# Patient Record
Sex: FEMALE | Race: WHITE | Employment: OTHER | URBAN - NONMETROPOLITAN AREA
[De-identification: names, ages, dates, MRNs, and addresses within clinical notes are randomized per-mention and may not be internally consistent; named-entity substitution may affect disease eponyms.]

---

## 2017-07-10 ENCOUNTER — HOSPITAL ENCOUNTER (OUTPATIENT)
Dept: NON INVASIVE DIAGNOSTICS | Age: 67
Discharge: HOME | End: 2017-07-10
Payer: MEDICARE

## 2017-07-10 PROCEDURE — 93005 ELECTROCARDIOGRAM TRACING: CPT

## 2017-07-13 LAB
EKG ATRIAL RATE: 99 BPM
EKG P AXIS: 49 DEGREES
EKG P-R INTERVAL: 140 MS
EKG Q-T INTERVAL: 336 MS
EKG QRS DURATION: 92 MS
EKG QTC CALCULATION (BAZETT): 431 MS
EKG R AXIS: 60 DEGREES
EKG T AXIS: 13 DEGREES
EKG VENTRICULAR RATE: 99 BPM

## 2017-10-17 ENCOUNTER — HOSPITAL ENCOUNTER (OUTPATIENT)
Dept: GENERAL RADIOLOGY | Age: 67
Discharge: HOME | End: 2017-10-17
Payer: MEDICARE
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
## 2017-10-17 DIAGNOSIS — R52 PAIN: ICD-10-CM

## 2017-10-17 PROCEDURE — 73502 X-RAY EXAM HIP UNI 2-3 VIEWS: CPT

## 2017-12-06 ENCOUNTER — HOSPITAL ENCOUNTER (OUTPATIENT)
Dept: WOMENS IMAGING | Age: 67
Discharge: HOME | End: 2017-12-06
Payer: MEDICARE

## 2017-12-06 DIAGNOSIS — Z12.39 SCREENING BREAST EXAMINATION: ICD-10-CM

## 2017-12-06 PROCEDURE — G0202 SCR MAMMO BI INCL CAD: HCPCS

## 2018-01-02 ENCOUNTER — HOSPITAL ENCOUNTER (OUTPATIENT)
Age: 68
Setting detail: SPECIMEN
Discharge: HOME | End: 2018-01-02
Payer: MEDICARE

## 2018-01-02 LAB
ABSOLUTE EOS #: 0.1 K/UL (ref 0–0.4)
ABSOLUTE IMMATURE GRANULOCYTE: ABNORMAL K/UL (ref 0–0.3)
ABSOLUTE LYMPH #: 1.4 K/UL (ref 1–4.8)
ABSOLUTE MONO #: 0.5 K/UL (ref 0–1)
ALBUMIN SERPL-MCNC: 3.1 G/DL (ref 3.5–5.2)
ALBUMIN/GLOBULIN RATIO: 1 (ref 1–2.5)
ALP BLD-CCNC: 73 U/L (ref 35–104)
ALT SERPL-CCNC: 12 U/L (ref 5–33)
ANION GAP SERPL CALCULATED.3IONS-SCNC: 10 MMOL/L (ref 9–17)
AST SERPL-CCNC: 14 U/L
BASOPHILS # BLD: 0 % (ref 0–2)
BASOPHILS ABSOLUTE: 0 K/UL (ref 0–0.2)
BILIRUB SERPL-MCNC: <0.1 MG/DL (ref 0.3–1.2)
BUN BLDV-MCNC: 13 MG/DL (ref 8–23)
BUN/CREAT BLD: 16 (ref 9–20)
CALCIUM SERPL-MCNC: 9.3 MG/DL (ref 8.6–10.4)
CHLORIDE BLD-SCNC: 103 MMOL/L (ref 98–107)
CHOLESTEROL/HDL RATIO: 3
CHOLESTEROL: 140 MG/DL
CO2: 27 MMOL/L (ref 20–31)
CREAT SERPL-MCNC: 0.79 MG/DL (ref 0.5–0.9)
DIFFERENTIAL TYPE: ABNORMAL
EOSINOPHILS RELATIVE PERCENT: 1 % (ref 0–8)
GFR AFRICAN AMERICAN: >60 ML/MIN
GFR NON-AFRICAN AMERICAN: >60 ML/MIN
GFR SERPL CREATININE-BSD FRML MDRD: ABNORMAL ML/MIN/{1.73_M2}
GFR SERPL CREATININE-BSD FRML MDRD: ABNORMAL ML/MIN/{1.73_M2}
GLUCOSE BLD-MCNC: 95 MG/DL (ref 70–99)
HCT VFR BLD CALC: 35.2 % (ref 36–46)
HDLC SERPL-MCNC: 47 MG/DL
HEMOGLOBIN: 11.1 G/DL (ref 12–16)
IMMATURE GRANULOCYTES: ABNORMAL %
LDL CHOLESTEROL: 77 MG/DL (ref 0–130)
LYMPHOCYTES # BLD: 20 % (ref 24–44)
MCH RBC QN AUTO: 26.3 PG (ref 26–34)
MCHC RBC AUTO-ENTMCNC: 31.6 G/DL (ref 31–37)
MCV RBC AUTO: 83.2 FL (ref 80–100)
MONOCYTES # BLD: 8 % (ref 0–12)
PDW BLD-RTO: 14.8 % (ref 12.1–15.2)
PLATELET # BLD: 324 K/UL (ref 140–450)
PLATELET ESTIMATE: ABNORMAL
PMV BLD AUTO: 9.5 FL (ref 6–12)
POTASSIUM SERPL-SCNC: 4.3 MMOL/L (ref 3.7–5.3)
RBC # BLD: 4.23 M/UL (ref 4–5.2)
RBC # BLD: ABNORMAL 10*6/UL
SEG NEUTROPHILS: 71 % (ref 36–66)
SEGMENTED NEUTROPHILS ABSOLUTE COUNT: 5.2 K/UL (ref 1.8–7.7)
SODIUM BLD-SCNC: 140 MMOL/L (ref 135–144)
TOTAL PROTEIN: 6.1 G/DL (ref 6.4–8.3)
TRIGL SERPL-MCNC: 80 MG/DL
VLDLC SERPL CALC-MCNC: NORMAL MG/DL (ref 1–30)
WBC # BLD: 7.3 K/UL (ref 3.5–11)
WBC # BLD: ABNORMAL 10*3/UL

## 2018-01-02 PROCEDURE — 85025 COMPLETE CBC W/AUTO DIFF WBC: CPT

## 2018-01-02 PROCEDURE — P9604 ONE-WAY ALLOW PRORATED TRIP: HCPCS

## 2018-01-02 PROCEDURE — 80053 COMPREHEN METABOLIC PANEL: CPT

## 2018-01-02 PROCEDURE — 36415 COLL VENOUS BLD VENIPUNCTURE: CPT

## 2018-01-02 PROCEDURE — 80061 LIPID PANEL: CPT

## 2018-01-08 ENCOUNTER — HOSPITAL ENCOUNTER (OUTPATIENT)
Dept: NON INVASIVE DIAGNOSTICS | Age: 68
Discharge: HOME | End: 2018-01-08
Payer: MEDICARE

## 2018-01-08 LAB
EKG ATRIAL RATE: 81 BPM
EKG P AXIS: 72 DEGREES
EKG P-R INTERVAL: 144 MS
EKG Q-T INTERVAL: 354 MS
EKG QRS DURATION: 90 MS
EKG QTC CALCULATION (BAZETT): 411 MS
EKG R AXIS: 72 DEGREES
EKG T AXIS: 21 DEGREES
EKG VENTRICULAR RATE: 81 BPM

## 2018-01-08 PROCEDURE — 93005 ELECTROCARDIOGRAM TRACING: CPT

## 2018-07-05 ENCOUNTER — HOSPITAL ENCOUNTER (OUTPATIENT)
Age: 68
Setting detail: SPECIMEN
Discharge: HOME | End: 2018-07-05
Payer: MEDICARE

## 2018-07-05 LAB
ABSOLUTE EOS #: <0.03 K/UL (ref 0–0.44)
ABSOLUTE IMMATURE GRANULOCYTE: <0.03 K/UL (ref 0–0.3)
ABSOLUTE LYMPH #: 1.91 K/UL (ref 1.1–3.7)
ABSOLUTE MONO #: 0.7 K/UL (ref 0.1–1.2)
ALBUMIN SERPL-MCNC: 3.7 G/DL (ref 3.5–5.2)
ALBUMIN/GLOBULIN RATIO: 1.2 (ref 1–2.5)
ALP BLD-CCNC: 85 U/L (ref 35–104)
ALT SERPL-CCNC: 14 U/L (ref 5–33)
ANION GAP SERPL CALCULATED.3IONS-SCNC: 14 MMOL/L (ref 9–17)
AST SERPL-CCNC: 18 U/L
BASOPHILS # BLD: 0 % (ref 0–2)
BASOPHILS ABSOLUTE: <0.03 K/UL (ref 0–0.2)
BILIRUB SERPL-MCNC: 0.16 MG/DL (ref 0.3–1.2)
BUN BLDV-MCNC: 20 MG/DL (ref 8–23)
BUN/CREAT BLD: 27 (ref 9–20)
CALCIUM SERPL-MCNC: 9.6 MG/DL (ref 8.6–10.4)
CHLORIDE BLD-SCNC: 106 MMOL/L (ref 98–107)
CHOLESTEROL/HDL RATIO: 2.7
CHOLESTEROL: 154 MG/DL
CO2: 21 MMOL/L (ref 20–31)
CREAT SERPL-MCNC: 0.75 MG/DL (ref 0.5–0.9)
DIFFERENTIAL TYPE: ABNORMAL
EOSINOPHILS RELATIVE PERCENT: 0 % (ref 1–4)
GFR AFRICAN AMERICAN: >60 ML/MIN
GFR NON-AFRICAN AMERICAN: >60 ML/MIN
GFR SERPL CREATININE-BSD FRML MDRD: ABNORMAL ML/MIN/{1.73_M2}
GFR SERPL CREATININE-BSD FRML MDRD: ABNORMAL ML/MIN/{1.73_M2}
GLUCOSE BLD-MCNC: 90 MG/DL (ref 70–99)
HCT VFR BLD CALC: 42.9 % (ref 36.3–47.1)
HDLC SERPL-MCNC: 57 MG/DL
HEMOGLOBIN: 13.4 G/DL (ref 11.9–15.1)
IMMATURE GRANULOCYTES: 0 %
LDL CHOLESTEROL: 84 MG/DL (ref 0–130)
LYMPHOCYTES # BLD: 28 % (ref 24–43)
MCH RBC QN AUTO: 27.1 PG (ref 25.2–33.5)
MCHC RBC AUTO-ENTMCNC: 31.2 G/DL (ref 28.4–34.8)
MCV RBC AUTO: 86.7 FL (ref 82.6–102.9)
MONOCYTES # BLD: 10 % (ref 3–12)
NRBC AUTOMATED: 0 PER 100 WBC
PDW BLD-RTO: 14.2 % (ref 11.8–14.4)
PLATELET # BLD: 307 K/UL (ref 138–453)
PLATELET ESTIMATE: ABNORMAL
PMV BLD AUTO: 11 FL (ref 8.1–13.5)
POTASSIUM SERPL-SCNC: 4.3 MMOL/L (ref 3.7–5.3)
RBC # BLD: 4.95 M/UL (ref 3.95–5.11)
RBC # BLD: ABNORMAL 10*6/UL
SEG NEUTROPHILS: 62 % (ref 36–65)
SEGMENTED NEUTROPHILS ABSOLUTE COUNT: 4.21 K/UL (ref 1.5–8.1)
SODIUM BLD-SCNC: 141 MMOL/L (ref 135–144)
TOTAL PROTEIN: 6.7 G/DL (ref 6.4–8.3)
TRIGL SERPL-MCNC: 63 MG/DL
VLDLC SERPL CALC-MCNC: NORMAL MG/DL (ref 1–30)
WBC # BLD: 6.9 K/UL (ref 3.5–11.3)
WBC # BLD: ABNORMAL 10*3/UL

## 2018-07-05 PROCEDURE — 80053 COMPREHEN METABOLIC PANEL: CPT

## 2018-07-05 PROCEDURE — P9604 ONE-WAY ALLOW PRORATED TRIP: HCPCS

## 2018-07-05 PROCEDURE — 85025 COMPLETE CBC W/AUTO DIFF WBC: CPT

## 2018-07-05 PROCEDURE — 36415 COLL VENOUS BLD VENIPUNCTURE: CPT

## 2018-07-05 PROCEDURE — 80061 LIPID PANEL: CPT

## 2018-07-09 ENCOUNTER — HOSPITAL ENCOUNTER (OUTPATIENT)
Dept: NON INVASIVE DIAGNOSTICS | Age: 68
Discharge: HOME | End: 2018-07-09
Payer: MEDICARE

## 2018-07-09 LAB
EKG ATRIAL RATE: 77 BPM
EKG P AXIS: 70 DEGREES
EKG P-R INTERVAL: 158 MS
EKG Q-T INTERVAL: 362 MS
EKG QRS DURATION: 90 MS
EKG QTC CALCULATION (BAZETT): 409 MS
EKG R AXIS: 70 DEGREES
EKG T AXIS: 24 DEGREES
EKG VENTRICULAR RATE: 77 BPM

## 2018-07-09 PROCEDURE — 93005 ELECTROCARDIOGRAM TRACING: CPT

## 2019-01-03 ENCOUNTER — HOSPITAL ENCOUNTER (OUTPATIENT)
Age: 69
Setting detail: SPECIMEN
Discharge: HOME | End: 2019-01-03
Payer: MEDICARE

## 2019-01-03 LAB
ABSOLUTE EOS #: <0.03 K/UL (ref 0–0.44)
ABSOLUTE IMMATURE GRANULOCYTE: <0.03 K/UL (ref 0–0.3)
ABSOLUTE LYMPH #: 1.95 K/UL (ref 1.1–3.7)
ABSOLUTE MONO #: 0.59 K/UL (ref 0.1–1.2)
ALBUMIN SERPL-MCNC: 3.8 G/DL (ref 3.5–5.2)
ALBUMIN/GLOBULIN RATIO: 1.2 (ref 1–2.5)
ALP BLD-CCNC: 104 U/L (ref 35–104)
ALT SERPL-CCNC: 17 U/L (ref 5–33)
ANION GAP SERPL CALCULATED.3IONS-SCNC: 12 MMOL/L (ref 9–17)
AST SERPL-CCNC: 17 U/L
BASOPHILS # BLD: 0 % (ref 0–2)
BASOPHILS ABSOLUTE: <0.03 K/UL (ref 0–0.2)
BILIRUB SERPL-MCNC: 0.15 MG/DL (ref 0.3–1.2)
BUN BLDV-MCNC: 21 MG/DL (ref 8–23)
BUN/CREAT BLD: 25 (ref 9–20)
CALCIUM SERPL-MCNC: 9.4 MG/DL (ref 8.6–10.4)
CHLORIDE BLD-SCNC: 105 MMOL/L (ref 98–107)
CHOLESTEROL/HDL RATIO: 3.2
CHOLESTEROL: 155 MG/DL
CO2: 24 MMOL/L (ref 20–31)
CREAT SERPL-MCNC: 0.84 MG/DL (ref 0.5–0.9)
DIFFERENTIAL TYPE: ABNORMAL
EOSINOPHILS RELATIVE PERCENT: 0 % (ref 1–4)
GFR AFRICAN AMERICAN: >60 ML/MIN
GFR NON-AFRICAN AMERICAN: >60 ML/MIN
GFR SERPL CREATININE-BSD FRML MDRD: ABNORMAL ML/MIN/{1.73_M2}
GFR SERPL CREATININE-BSD FRML MDRD: ABNORMAL ML/MIN/{1.73_M2}
GLUCOSE BLD-MCNC: 85 MG/DL (ref 70–99)
HCT VFR BLD CALC: 45.1 % (ref 36.3–47.1)
HDLC SERPL-MCNC: 49 MG/DL
HEMOGLOBIN: 13.9 G/DL (ref 11.9–15.1)
IMMATURE GRANULOCYTES: 0 %
LDL CHOLESTEROL: 81 MG/DL (ref 0–130)
LYMPHOCYTES # BLD: 29 % (ref 24–43)
MCH RBC QN AUTO: 27.1 PG (ref 25.2–33.5)
MCHC RBC AUTO-ENTMCNC: 30.8 G/DL (ref 28.4–34.8)
MCV RBC AUTO: 88.1 FL (ref 82.6–102.9)
MONOCYTES # BLD: 9 % (ref 3–12)
NRBC AUTOMATED: 0 PER 100 WBC
PDW BLD-RTO: 14 % (ref 11.8–14.4)
PLATELET # BLD: 274 K/UL (ref 138–453)
PLATELET ESTIMATE: ABNORMAL
PMV BLD AUTO: 12 FL (ref 8.1–13.5)
POTASSIUM SERPL-SCNC: 4.2 MMOL/L (ref 3.7–5.3)
RBC # BLD: 5.12 M/UL (ref 3.95–5.11)
RBC # BLD: ABNORMAL 10*6/UL
SEG NEUTROPHILS: 62 % (ref 36–65)
SEGMENTED NEUTROPHILS ABSOLUTE COUNT: 4.18 K/UL (ref 1.5–8.1)
SODIUM BLD-SCNC: 141 MMOL/L (ref 135–144)
TOTAL PROTEIN: 7 G/DL (ref 6.4–8.3)
TRIGL SERPL-MCNC: 127 MG/DL
VLDLC SERPL CALC-MCNC: NORMAL MG/DL (ref 1–30)
WBC # BLD: 6.8 K/UL (ref 3.5–11.3)
WBC # BLD: ABNORMAL 10*3/UL

## 2019-01-03 PROCEDURE — 85025 COMPLETE CBC W/AUTO DIFF WBC: CPT

## 2019-01-03 PROCEDURE — 80053 COMPREHEN METABOLIC PANEL: CPT

## 2019-01-03 PROCEDURE — 80061 LIPID PANEL: CPT

## 2019-01-03 PROCEDURE — 36415 COLL VENOUS BLD VENIPUNCTURE: CPT

## 2019-01-03 PROCEDURE — P9603 ONE-WAY ALLOW PRORATED MILES: HCPCS

## 2019-01-11 ENCOUNTER — HOSPITAL ENCOUNTER (OUTPATIENT)
Dept: WOMENS IMAGING | Age: 69
Discharge: HOME | End: 2019-01-13
Payer: MEDICARE

## 2019-01-11 DIAGNOSIS — Z12.39 BREAST CANCER SCREENING: ICD-10-CM

## 2019-01-11 PROCEDURE — 77067 SCR MAMMO BI INCL CAD: CPT

## 2019-01-11 PROCEDURE — 77063 BREAST TOMOSYNTHESIS BI: CPT

## 2019-01-14 ENCOUNTER — HOSPITAL ENCOUNTER (OUTPATIENT)
Dept: NON INVASIVE DIAGNOSTICS | Age: 69
Discharge: HOME | End: 2019-01-14
Payer: MEDICARE

## 2019-01-14 LAB
EKG ATRIAL RATE: 70 BPM
EKG P AXIS: 55 DEGREES
EKG P-R INTERVAL: 152 MS
EKG Q-T INTERVAL: 380 MS
EKG QRS DURATION: 96 MS
EKG QTC CALCULATION (BAZETT): 410 MS
EKG R AXIS: 67 DEGREES
EKG T AXIS: 9 DEGREES
EKG VENTRICULAR RATE: 70 BPM

## 2019-01-14 PROCEDURE — 93005 ELECTROCARDIOGRAM TRACING: CPT

## 2019-01-15 ENCOUNTER — HOSPITAL ENCOUNTER (OUTPATIENT)
Age: 69
Setting detail: SPECIMEN
Discharge: HOME | End: 2019-01-15
Payer: MEDICARE

## 2019-01-15 LAB
-: NORMAL
AMORPHOUS: NORMAL
BACTERIA: NORMAL
BILIRUBIN URINE: NEGATIVE
CASTS UA: NORMAL /LPF
COLOR: YELLOW
COMMENT UA: ABNORMAL
CRYSTALS, UA: NORMAL /HPF
EPITHELIAL CELLS UA: NORMAL /HPF (ref 0–25)
GLUCOSE URINE: NEGATIVE
KETONES, URINE: NEGATIVE
LEUKOCYTE ESTERASE, URINE: NEGATIVE
MUCUS: NORMAL
NITRITE, URINE: NEGATIVE
OTHER OBSERVATIONS UA: NORMAL
PH UA: 5.5 (ref 5–9)
PROTEIN UA: NEGATIVE
RBC UA: NORMAL /HPF (ref 0–2)
RENAL EPITHELIAL, UA: NORMAL /HPF
SPECIFIC GRAVITY UA: <1.005 (ref 1.01–1.02)
TRICHOMONAS: NORMAL
TURBIDITY: CLEAR
URINE HGB: NEGATIVE
UROBILINOGEN, URINE: NORMAL
WBC UA: NORMAL /HPF (ref 0–5)
YEAST: NORMAL

## 2019-01-15 PROCEDURE — 81001 URINALYSIS AUTO W/SCOPE: CPT

## 2019-07-05 ENCOUNTER — HOSPITAL ENCOUNTER (OUTPATIENT)
Age: 69
Setting detail: SPECIMEN
Discharge: HOME | End: 2019-07-05
Payer: MEDICARE

## 2019-07-05 LAB
ABSOLUTE EOS #: <0.03 K/UL (ref 0–0.44)
ABSOLUTE IMMATURE GRANULOCYTE: <0.03 K/UL (ref 0–0.3)
ABSOLUTE LYMPH #: 1.58 K/UL (ref 1.1–3.7)
ABSOLUTE MONO #: 0.5 K/UL (ref 0.1–1.2)
ALBUMIN SERPL-MCNC: 3.7 G/DL (ref 3.5–5.2)
ALBUMIN/GLOBULIN RATIO: 1.3 (ref 1–2.5)
ALP BLD-CCNC: 97 U/L (ref 35–104)
ALT SERPL-CCNC: 19 U/L (ref 5–33)
ANION GAP SERPL CALCULATED.3IONS-SCNC: 10 MMOL/L (ref 9–17)
AST SERPL-CCNC: 17 U/L
BASOPHILS # BLD: 0 % (ref 0–2)
BASOPHILS ABSOLUTE: <0.03 K/UL (ref 0–0.2)
BILIRUB SERPL-MCNC: 0.26 MG/DL (ref 0.3–1.2)
BUN BLDV-MCNC: 19 MG/DL (ref 8–23)
BUN/CREAT BLD: 25 (ref 9–20)
CALCIUM SERPL-MCNC: 9.6 MG/DL (ref 8.6–10.4)
CHLORIDE BLD-SCNC: 103 MMOL/L (ref 98–107)
CHOLESTEROL/HDL RATIO: 2.9
CHOLESTEROL: 156 MG/DL
CO2: 25 MMOL/L (ref 20–31)
CREAT SERPL-MCNC: 0.77 MG/DL (ref 0.5–0.9)
DIFFERENTIAL TYPE: ABNORMAL
EOSINOPHILS RELATIVE PERCENT: 0 % (ref 1–4)
GFR AFRICAN AMERICAN: >60 ML/MIN
GFR NON-AFRICAN AMERICAN: >60 ML/MIN
GFR SERPL CREATININE-BSD FRML MDRD: ABNORMAL ML/MIN/{1.73_M2}
GFR SERPL CREATININE-BSD FRML MDRD: ABNORMAL ML/MIN/{1.73_M2}
GLUCOSE BLD-MCNC: 85 MG/DL (ref 70–99)
HCT VFR BLD CALC: 45.9 % (ref 36.3–47.1)
HDLC SERPL-MCNC: 53 MG/DL
HEMOGLOBIN: 14.5 G/DL (ref 11.9–15.1)
IMMATURE GRANULOCYTES: 0 %
LDL CHOLESTEROL: 91 MG/DL (ref 0–130)
LYMPHOCYTES # BLD: 29 % (ref 24–43)
MCH RBC QN AUTO: 27.6 PG (ref 25.2–33.5)
MCHC RBC AUTO-ENTMCNC: 31.6 G/DL (ref 28.4–34.8)
MCV RBC AUTO: 87.3 FL (ref 82.6–102.9)
MONOCYTES # BLD: 9 % (ref 3–12)
NRBC AUTOMATED: 0 PER 100 WBC
PDW BLD-RTO: 14.3 % (ref 11.8–14.4)
PLATELET # BLD: 257 K/UL (ref 138–453)
PLATELET ESTIMATE: ABNORMAL
PMV BLD AUTO: 11.2 FL (ref 8.1–13.5)
POTASSIUM SERPL-SCNC: 4.1 MMOL/L (ref 3.7–5.3)
RBC # BLD: 5.26 M/UL (ref 3.95–5.11)
RBC # BLD: ABNORMAL 10*6/UL
SEG NEUTROPHILS: 62 % (ref 36–65)
SEGMENTED NEUTROPHILS ABSOLUTE COUNT: 3.42 K/UL (ref 1.5–8.1)
SODIUM BLD-SCNC: 138 MMOL/L (ref 135–144)
TOTAL PROTEIN: 6.6 G/DL (ref 6.4–8.3)
TRIGL SERPL-MCNC: 60 MG/DL
VLDLC SERPL CALC-MCNC: NORMAL MG/DL (ref 1–30)
WBC # BLD: 5.5 K/UL (ref 3.5–11.3)
WBC # BLD: ABNORMAL 10*3/UL

## 2019-07-05 PROCEDURE — P9603 ONE-WAY ALLOW PRORATED MILES: HCPCS

## 2019-07-05 PROCEDURE — 36415 COLL VENOUS BLD VENIPUNCTURE: CPT

## 2019-07-05 PROCEDURE — 80053 COMPREHEN METABOLIC PANEL: CPT

## 2019-07-05 PROCEDURE — 80061 LIPID PANEL: CPT

## 2019-07-05 PROCEDURE — 85025 COMPLETE CBC W/AUTO DIFF WBC: CPT

## 2019-07-08 ENCOUNTER — HOSPITAL ENCOUNTER (OUTPATIENT)
Dept: NON INVASIVE DIAGNOSTICS | Age: 69
Discharge: HOME | End: 2019-07-08
Payer: MEDICARE

## 2019-07-08 PROCEDURE — 93005 ELECTROCARDIOGRAM TRACING: CPT | Performed by: INTERNAL MEDICINE

## 2019-07-09 LAB
EKG ATRIAL RATE: 75 BPM
EKG P AXIS: 64 DEGREES
EKG P-R INTERVAL: 152 MS
EKG Q-T INTERVAL: 360 MS
EKG QRS DURATION: 94 MS
EKG QTC CALCULATION (BAZETT): 402 MS
EKG R AXIS: 74 DEGREES
EKG T AXIS: 0 DEGREES
EKG VENTRICULAR RATE: 75 BPM

## 2019-07-09 PROCEDURE — 93010 ELECTROCARDIOGRAM REPORT: CPT | Performed by: INTERNAL MEDICINE

## 2019-07-29 ENCOUNTER — HOSPITAL ENCOUNTER (EMERGENCY)
Age: 69
Discharge: HOME | End: 2019-07-29
Attending: EMERGENCY MEDICINE
Payer: MEDICARE

## 2019-07-29 VITALS
HEART RATE: 96 BPM | TEMPERATURE: 98.9 F | BODY MASS INDEX: 28.32 KG/M2 | RESPIRATION RATE: 28 BRPM | OXYGEN SATURATION: 94 % | WEIGHT: 145 LBS

## 2019-07-29 DIAGNOSIS — R07.9 CHEST PAIN, UNSPECIFIED TYPE: Primary | ICD-10-CM

## 2019-07-29 PROCEDURE — 93005 ELECTROCARDIOGRAM TRACING: CPT | Performed by: EMERGENCY MEDICINE

## 2019-07-29 PROCEDURE — 99284 EMERGENCY DEPT VISIT MOD MDM: CPT

## 2019-07-29 RX ORDER — OMEPRAZOLE 20 MG/1
40 CAPSULE, DELAYED RELEASE ORAL DAILY
COMMUNITY

## 2019-07-30 LAB
EKG ATRIAL RATE: 79 BPM
EKG P AXIS: 41 DEGREES
EKG P-R INTERVAL: 152 MS
EKG Q-T INTERVAL: 376 MS
EKG QRS DURATION: 96 MS
EKG QTC CALCULATION (BAZETT): 431 MS
EKG R AXIS: 16 DEGREES
EKG T AXIS: -6 DEGREES
EKG VENTRICULAR RATE: 79 BPM

## 2019-07-30 PROCEDURE — 93010 ELECTROCARDIOGRAM REPORT: CPT | Performed by: INTERNAL MEDICINE

## 2020-01-13 ENCOUNTER — HOSPITAL ENCOUNTER (OUTPATIENT)
Dept: NON INVASIVE DIAGNOSTICS | Age: 70
Discharge: HOME | End: 2020-01-13
Payer: MEDICARE

## 2020-01-13 LAB
EKG ATRIAL RATE: 99 BPM
EKG P AXIS: 61 DEGREES
EKG P-R INTERVAL: 140 MS
EKG Q-T INTERVAL: 318 MS
EKG QRS DURATION: 88 MS
EKG QTC CALCULATION (BAZETT): 408 MS
EKG R AXIS: 60 DEGREES
EKG T AXIS: -20 DEGREES
EKG VENTRICULAR RATE: 99 BPM

## 2020-01-13 PROCEDURE — 93005 ELECTROCARDIOGRAM TRACING: CPT | Performed by: INTERNAL MEDICINE

## 2020-01-13 PROCEDURE — 93010 ELECTROCARDIOGRAM REPORT: CPT | Performed by: INTERNAL MEDICINE

## 2020-01-16 ENCOUNTER — HOSPITAL ENCOUNTER (OUTPATIENT)
Age: 70
Setting detail: SPECIMEN
Discharge: HOME | End: 2020-01-16
Payer: MEDICARE

## 2020-01-16 LAB
ABSOLUTE EOS #: <0.03 K/UL (ref 0–0.44)
ABSOLUTE IMMATURE GRANULOCYTE: <0.03 K/UL (ref 0–0.3)
ABSOLUTE LYMPH #: 1.17 K/UL (ref 1.1–3.7)
ABSOLUTE MONO #: 0.5 K/UL (ref 0.1–1.2)
ALBUMIN SERPL-MCNC: 4.3 G/DL (ref 3.5–5.2)
ALBUMIN/GLOBULIN RATIO: 1.4 (ref 1–2.5)
ALP BLD-CCNC: 100 U/L (ref 35–104)
ALT SERPL-CCNC: 25 U/L (ref 5–33)
ANION GAP SERPL CALCULATED.3IONS-SCNC: 13 MMOL/L (ref 9–17)
AST SERPL-CCNC: 23 U/L
BASOPHILS # BLD: 0 % (ref 0–2)
BASOPHILS ABSOLUTE: <0.03 K/UL (ref 0–0.2)
BILIRUB SERPL-MCNC: 0.28 MG/DL (ref 0.3–1.2)
BUN BLDV-MCNC: 20 MG/DL (ref 8–23)
BUN/CREAT BLD: 27 (ref 9–20)
CALCIUM SERPL-MCNC: 9.9 MG/DL (ref 8.6–10.4)
CHLORIDE BLD-SCNC: 104 MMOL/L (ref 98–107)
CHOLESTEROL/HDL RATIO: 2.7
CHOLESTEROL: 177 MG/DL
CO2: 22 MMOL/L (ref 20–31)
CREAT SERPL-MCNC: 0.75 MG/DL (ref 0.5–0.9)
DIFFERENTIAL TYPE: ABNORMAL
EOSINOPHILS RELATIVE PERCENT: 0 % (ref 1–4)
GFR AFRICAN AMERICAN: >60 ML/MIN
GFR NON-AFRICAN AMERICAN: >60 ML/MIN
GFR SERPL CREATININE-BSD FRML MDRD: ABNORMAL ML/MIN/{1.73_M2}
GFR SERPL CREATININE-BSD FRML MDRD: ABNORMAL ML/MIN/{1.73_M2}
GLUCOSE BLD-MCNC: 99 MG/DL (ref 70–99)
HCT VFR BLD CALC: 47.8 % (ref 36.3–47.1)
HDLC SERPL-MCNC: 65 MG/DL
HEMOGLOBIN: 15.2 G/DL (ref 11.9–15.1)
IMMATURE GRANULOCYTES: 0 %
LDL CHOLESTEROL: 99 MG/DL (ref 0–130)
LYMPHOCYTES # BLD: 21 % (ref 24–43)
MCH RBC QN AUTO: 27.5 PG (ref 25.2–33.5)
MCHC RBC AUTO-ENTMCNC: 31.8 G/DL (ref 28.4–34.8)
MCV RBC AUTO: 86.6 FL (ref 82.6–102.9)
MONOCYTES # BLD: 9 % (ref 3–12)
NRBC AUTOMATED: 0 PER 100 WBC
PDW BLD-RTO: 13.9 % (ref 11.8–14.4)
PLATELET # BLD: 276 K/UL (ref 138–453)
PLATELET ESTIMATE: ABNORMAL
PMV BLD AUTO: 11.2 FL (ref 8.1–13.5)
POTASSIUM SERPL-SCNC: 4.4 MMOL/L (ref 3.7–5.3)
RBC # BLD: 5.52 M/UL (ref 3.95–5.11)
RBC # BLD: ABNORMAL 10*6/UL
SEG NEUTROPHILS: 70 % (ref 36–65)
SEGMENTED NEUTROPHILS ABSOLUTE COUNT: 3.93 K/UL (ref 1.5–8.1)
SODIUM BLD-SCNC: 139 MMOL/L (ref 135–144)
TOTAL PROTEIN: 7.4 G/DL (ref 6.4–8.3)
TRIGL SERPL-MCNC: 65 MG/DL
VLDLC SERPL CALC-MCNC: NORMAL MG/DL (ref 1–30)
WBC # BLD: 5.6 K/UL (ref 3.5–11.3)
WBC # BLD: ABNORMAL 10*3/UL

## 2020-01-16 PROCEDURE — P9603 ONE-WAY ALLOW PRORATED MILES: HCPCS

## 2020-01-16 PROCEDURE — 85025 COMPLETE CBC W/AUTO DIFF WBC: CPT

## 2020-01-16 PROCEDURE — 80053 COMPREHEN METABOLIC PANEL: CPT

## 2020-01-16 PROCEDURE — 36415 COLL VENOUS BLD VENIPUNCTURE: CPT

## 2020-01-16 PROCEDURE — 80061 LIPID PANEL: CPT

## 2020-07-13 ENCOUNTER — HOSPITAL ENCOUNTER (OUTPATIENT)
Dept: NON INVASIVE DIAGNOSTICS | Age: 70
Discharge: HOME | End: 2020-07-13
Payer: MEDICARE

## 2020-07-13 LAB
EKG ATRIAL RATE: 77 BPM
EKG P AXIS: 65 DEGREES
EKG P-R INTERVAL: 152 MS
EKG Q-T INTERVAL: 352 MS
EKG QRS DURATION: 84 MS
EKG QTC CALCULATION (BAZETT): 398 MS
EKG R AXIS: 69 DEGREES
EKG T AXIS: 16 DEGREES
EKG VENTRICULAR RATE: 77 BPM

## 2020-07-13 PROCEDURE — 93005 ELECTROCARDIOGRAM TRACING: CPT | Performed by: INTERNAL MEDICINE

## 2020-07-13 PROCEDURE — 93010 ELECTROCARDIOGRAM REPORT: CPT | Performed by: INTERNAL MEDICINE

## 2020-07-23 ENCOUNTER — HOSPITAL ENCOUNTER (OUTPATIENT)
Age: 70
Setting detail: SPECIMEN
Discharge: HOME | End: 2020-07-23
Payer: MEDICARE

## 2020-07-23 LAB
ABSOLUTE EOS #: <0.03 K/UL (ref 0–0.44)
ABSOLUTE IMMATURE GRANULOCYTE: 0.04 K/UL (ref 0–0.3)
ABSOLUTE LYMPH #: 1.72 K/UL (ref 1.1–3.7)
ABSOLUTE MONO #: 0.6 K/UL (ref 0.1–1.2)
ALBUMIN SERPL-MCNC: 4 G/DL (ref 3.5–5.2)
ALBUMIN/GLOBULIN RATIO: 1.3 (ref 1–2.5)
ALP BLD-CCNC: 106 U/L (ref 35–104)
ALT SERPL-CCNC: 18 U/L (ref 5–33)
ANION GAP SERPL CALCULATED.3IONS-SCNC: 14 MMOL/L (ref 9–17)
AST SERPL-CCNC: 19 U/L
BASOPHILS # BLD: 0 % (ref 0–2)
BASOPHILS ABSOLUTE: <0.03 K/UL (ref 0–0.2)
BILIRUB SERPL-MCNC: 0.31 MG/DL (ref 0.3–1.2)
BUN BLDV-MCNC: 17 MG/DL (ref 8–23)
BUN/CREAT BLD: 24 (ref 9–20)
CALCIUM SERPL-MCNC: 9.7 MG/DL (ref 8.6–10.4)
CHLORIDE BLD-SCNC: 103 MMOL/L (ref 98–107)
CHOLESTEROL/HDL RATIO: 3.2
CHOLESTEROL: 185 MG/DL
CO2: 20 MMOL/L (ref 20–31)
CREAT SERPL-MCNC: 0.72 MG/DL (ref 0.5–0.9)
DIFFERENTIAL TYPE: ABNORMAL
EOSINOPHILS RELATIVE PERCENT: 0 % (ref 1–4)
GFR AFRICAN AMERICAN: >60 ML/MIN
GFR NON-AFRICAN AMERICAN: >60 ML/MIN
GFR SERPL CREATININE-BSD FRML MDRD: ABNORMAL ML/MIN/{1.73_M2}
GFR SERPL CREATININE-BSD FRML MDRD: ABNORMAL ML/MIN/{1.73_M2}
GLUCOSE BLD-MCNC: 96 MG/DL (ref 70–99)
HCT VFR BLD CALC: 48.5 % (ref 36.3–47.1)
HDLC SERPL-MCNC: 58 MG/DL
HEMOGLOBIN: 15.4 G/DL (ref 11.9–15.1)
IMMATURE GRANULOCYTES: 1 %
LDL CHOLESTEROL: 109 MG/DL (ref 0–130)
LYMPHOCYTES # BLD: 27 % (ref 24–43)
MCH RBC QN AUTO: 27.8 PG (ref 25.2–33.5)
MCHC RBC AUTO-ENTMCNC: 31.8 G/DL (ref 28.4–34.8)
MCV RBC AUTO: 87.5 FL (ref 82.6–102.9)
MONOCYTES # BLD: 10 % (ref 3–12)
NRBC AUTOMATED: 0 PER 100 WBC
PDW BLD-RTO: 14.1 % (ref 11.8–14.4)
PLATELET # BLD: 288 K/UL (ref 138–453)
PLATELET ESTIMATE: ABNORMAL
PMV BLD AUTO: 10.6 FL (ref 8.1–13.5)
POTASSIUM SERPL-SCNC: 4 MMOL/L (ref 3.7–5.3)
RBC # BLD: 5.54 M/UL (ref 3.95–5.11)
RBC # BLD: ABNORMAL 10*6/UL
SEG NEUTROPHILS: 62 % (ref 36–65)
SEGMENTED NEUTROPHILS ABSOLUTE COUNT: 3.96 K/UL (ref 1.5–8.1)
SODIUM BLD-SCNC: 137 MMOL/L (ref 135–144)
TOTAL PROTEIN: 7.1 G/DL (ref 6.4–8.3)
TRIGL SERPL-MCNC: 92 MG/DL
VLDLC SERPL CALC-MCNC: NORMAL MG/DL (ref 1–30)
WBC # BLD: 6.3 K/UL (ref 3.5–11.3)
WBC # BLD: ABNORMAL 10*3/UL

## 2020-07-23 PROCEDURE — 80053 COMPREHEN METABOLIC PANEL: CPT

## 2020-07-23 PROCEDURE — 36415 COLL VENOUS BLD VENIPUNCTURE: CPT

## 2020-07-23 PROCEDURE — 85025 COMPLETE CBC W/AUTO DIFF WBC: CPT

## 2020-07-23 PROCEDURE — 80061 LIPID PANEL: CPT

## 2020-07-23 PROCEDURE — P9603 ONE-WAY ALLOW PRORATED MILES: HCPCS

## 2021-01-11 ENCOUNTER — HOSPITAL ENCOUNTER (OUTPATIENT)
Dept: NON INVASIVE DIAGNOSTICS | Age: 71
Discharge: HOME | End: 2021-01-11
Payer: MEDICARE

## 2021-01-11 LAB
EKG ATRIAL RATE: 77 BPM
EKG P AXIS: 43 DEGREES
EKG P-R INTERVAL: 148 MS
EKG Q-T INTERVAL: 370 MS
EKG QRS DURATION: 88 MS
EKG QTC CALCULATION (BAZETT): 418 MS
EKG R AXIS: 50 DEGREES
EKG T AXIS: -4 DEGREES
EKG VENTRICULAR RATE: 77 BPM

## 2021-01-11 PROCEDURE — 93010 ELECTROCARDIOGRAM REPORT: CPT | Performed by: INTERNAL MEDICINE

## 2021-01-11 PROCEDURE — 93005 ELECTROCARDIOGRAM TRACING: CPT | Performed by: INTERNAL MEDICINE

## 2021-01-19 ENCOUNTER — HOSPITAL ENCOUNTER (OUTPATIENT)
Age: 71
Setting detail: SPECIMEN
Discharge: HOME | End: 2021-01-19
Payer: MEDICARE

## 2021-01-19 LAB
ABSOLUTE EOS #: <0.03 K/UL (ref 0–0.44)
ABSOLUTE IMMATURE GRANULOCYTE: 0.03 K/UL (ref 0–0.3)
ABSOLUTE LYMPH #: 1.34 K/UL (ref 1.1–3.7)
ABSOLUTE MONO #: 0.42 K/UL (ref 0.1–1.2)
ALBUMIN SERPL-MCNC: 3.7 G/DL (ref 3.5–5.2)
ALBUMIN/GLOBULIN RATIO: 1.2 (ref 1–2.5)
ALP BLD-CCNC: 106 U/L (ref 35–104)
ALT SERPL-CCNC: 18 U/L (ref 5–33)
ANION GAP SERPL CALCULATED.3IONS-SCNC: 10 MMOL/L (ref 9–17)
AST SERPL-CCNC: 28 U/L
BASOPHILS # BLD: 0 % (ref 0–2)
BASOPHILS ABSOLUTE: <0.03 K/UL (ref 0–0.2)
BILIRUB SERPL-MCNC: 0.32 MG/DL (ref 0.3–1.2)
BUN BLDV-MCNC: 17 MG/DL (ref 8–23)
BUN/CREAT BLD: 24 (ref 9–20)
CALCIUM SERPL-MCNC: 9.5 MG/DL (ref 8.6–10.4)
CHLORIDE BLD-SCNC: 105 MMOL/L (ref 98–107)
CHOLESTEROL/HDL RATIO: 3.5
CHOLESTEROL: 156 MG/DL
CO2: 23 MMOL/L (ref 20–31)
CREAT SERPL-MCNC: 0.72 MG/DL (ref 0.5–0.9)
DIFFERENTIAL TYPE: ABNORMAL
EOSINOPHILS RELATIVE PERCENT: 0 % (ref 1–4)
GFR AFRICAN AMERICAN: >60 ML/MIN
GFR NON-AFRICAN AMERICAN: >60 ML/MIN
GFR SERPL CREATININE-BSD FRML MDRD: ABNORMAL ML/MIN/{1.73_M2}
GFR SERPL CREATININE-BSD FRML MDRD: ABNORMAL ML/MIN/{1.73_M2}
GLUCOSE BLD-MCNC: 89 MG/DL (ref 70–99)
HCT VFR BLD CALC: 49.1 % (ref 36.3–47.1)
HDLC SERPL-MCNC: 45 MG/DL
HEMOGLOBIN: 15.3 G/DL (ref 11.9–15.1)
IMMATURE GRANULOCYTES: 1 %
LDL CHOLESTEROL: 91 MG/DL (ref 0–130)
LYMPHOCYTES # BLD: 24 % (ref 24–43)
MCH RBC QN AUTO: 27.1 PG (ref 25.2–33.5)
MCHC RBC AUTO-ENTMCNC: 31.2 G/DL (ref 28.4–34.8)
MCV RBC AUTO: 86.9 FL (ref 82.6–102.9)
MONOCYTES # BLD: 7 % (ref 3–12)
NRBC AUTOMATED: 0 PER 100 WBC
PDW BLD-RTO: 13.7 % (ref 11.8–14.4)
PLATELET # BLD: 273 K/UL (ref 138–453)
PLATELET ESTIMATE: ABNORMAL
PMV BLD AUTO: 10.8 FL (ref 8.1–13.5)
POTASSIUM SERPL-SCNC: 4.7 MMOL/L (ref 3.7–5.3)
RBC # BLD: 5.65 M/UL (ref 3.95–5.11)
RBC # BLD: ABNORMAL 10*6/UL
SEG NEUTROPHILS: 68 % (ref 36–65)
SEGMENTED NEUTROPHILS ABSOLUTE COUNT: 3.89 K/UL (ref 1.5–8.1)
SODIUM BLD-SCNC: 138 MMOL/L (ref 135–144)
TOTAL PROTEIN: 6.9 G/DL (ref 6.4–8.3)
TRIGL SERPL-MCNC: 98 MG/DL
VLDLC SERPL CALC-MCNC: NORMAL MG/DL (ref 1–30)
WBC # BLD: 5.7 K/UL (ref 3.5–11.3)
WBC # BLD: ABNORMAL 10*3/UL

## 2021-01-19 PROCEDURE — P9603 ONE-WAY ALLOW PRORATED MILES: HCPCS

## 2021-01-19 PROCEDURE — 80061 LIPID PANEL: CPT

## 2021-01-19 PROCEDURE — 85025 COMPLETE CBC W/AUTO DIFF WBC: CPT

## 2021-01-19 PROCEDURE — 80053 COMPREHEN METABOLIC PANEL: CPT

## 2021-01-19 PROCEDURE — 36415 COLL VENOUS BLD VENIPUNCTURE: CPT

## 2021-02-12 ENCOUNTER — HOSPITAL ENCOUNTER (OUTPATIENT)
Dept: WOMENS IMAGING | Age: 71
Discharge: HOME | End: 2021-02-14
Payer: MEDICARE

## 2021-02-12 DIAGNOSIS — Z12.31 ENCOUNTER FOR SCREENING MAMMOGRAM FOR MALIGNANT NEOPLASM OF BREAST: ICD-10-CM

## 2021-02-12 PROCEDURE — 77067 SCR MAMMO BI INCL CAD: CPT

## 2021-03-04 ENCOUNTER — HOSPITAL ENCOUNTER (OUTPATIENT)
Dept: WOMENS IMAGING | Age: 71
Discharge: HOME | End: 2021-03-06
Payer: MEDICARE

## 2021-03-04 ENCOUNTER — HOSPITAL ENCOUNTER (OUTPATIENT)
Dept: ULTRASOUND IMAGING | Age: 71
Discharge: HOME | End: 2021-03-06
Payer: MEDICARE

## 2021-03-04 DIAGNOSIS — R92.8 ABNORMAL MAMMOGRAM: ICD-10-CM

## 2021-03-04 DIAGNOSIS — R92.8 ABNORMAL MAMMOGRAM OF RIGHT BREAST: ICD-10-CM

## 2021-03-04 PROCEDURE — 76641 ULTRASOUND BREAST COMPLETE: CPT

## 2021-03-04 PROCEDURE — G0279 TOMOSYNTHESIS, MAMMO: HCPCS

## 2021-07-12 ENCOUNTER — HOSPITAL ENCOUNTER (OUTPATIENT)
Age: 71
Discharge: HOME | End: 2021-07-12
Payer: MEDICARE

## 2021-07-12 LAB
EKG ATRIAL RATE: 76 BPM
EKG P AXIS: 52 DEGREES
EKG P-R INTERVAL: 162 MS
EKG Q-T INTERVAL: 360 MS
EKG QRS DURATION: 90 MS
EKG QTC CALCULATION (BAZETT): 405 MS
EKG R AXIS: 51 DEGREES
EKG T AXIS: -2 DEGREES
EKG VENTRICULAR RATE: 76 BPM

## 2021-07-12 PROCEDURE — 93005 ELECTROCARDIOGRAM TRACING: CPT | Performed by: FAMILY MEDICINE

## 2021-07-12 PROCEDURE — 93010 ELECTROCARDIOGRAM REPORT: CPT | Performed by: INTERNAL MEDICINE

## 2021-07-15 ENCOUNTER — HOSPITAL ENCOUNTER (OUTPATIENT)
Age: 71
Setting detail: SPECIMEN
Discharge: HOME | End: 2021-07-15
Payer: MEDICARE

## 2021-07-15 LAB
ABSOLUTE EOS #: <0.03 K/UL (ref 0–0.44)
ABSOLUTE IMMATURE GRANULOCYTE: <0.03 K/UL (ref 0–0.3)
ABSOLUTE LYMPH #: 1.35 K/UL (ref 1.1–3.7)
ABSOLUTE MONO #: 0.43 K/UL (ref 0.1–1.2)
ALBUMIN SERPL-MCNC: 3.8 G/DL (ref 3.5–5.2)
ALBUMIN/GLOBULIN RATIO: 1.2 (ref 1–2.5)
ALP BLD-CCNC: 152 U/L (ref 35–104)
ALT SERPL-CCNC: 11 U/L (ref 5–33)
ANION GAP SERPL CALCULATED.3IONS-SCNC: 11 MMOL/L (ref 9–17)
AST SERPL-CCNC: 12 U/L
BASOPHILS # BLD: 0 % (ref 0–2)
BASOPHILS ABSOLUTE: <0.03 K/UL (ref 0–0.2)
BILIRUB SERPL-MCNC: 0.23 MG/DL (ref 0.3–1.2)
BUN BLDV-MCNC: 18 MG/DL (ref 8–23)
BUN/CREAT BLD: 28 (ref 9–20)
CALCIUM SERPL-MCNC: 9.1 MG/DL (ref 8.6–10.4)
CHLORIDE BLD-SCNC: 99 MMOL/L (ref 98–107)
CHOLESTEROL/HDL RATIO: 3.6
CHOLESTEROL: 158 MG/DL
CO2: 24 MMOL/L (ref 20–31)
CREAT SERPL-MCNC: 0.65 MG/DL (ref 0.5–0.9)
DIFFERENTIAL TYPE: ABNORMAL
EOSINOPHILS RELATIVE PERCENT: 0 % (ref 1–4)
GFR AFRICAN AMERICAN: >60 ML/MIN
GFR NON-AFRICAN AMERICAN: >60 ML/MIN
GFR SERPL CREATININE-BSD FRML MDRD: ABNORMAL ML/MIN/{1.73_M2}
GFR SERPL CREATININE-BSD FRML MDRD: ABNORMAL ML/MIN/{1.73_M2}
GLUCOSE BLD-MCNC: 76 MG/DL (ref 70–99)
HCT VFR BLD CALC: 47.3 % (ref 36.3–47.1)
HDLC SERPL-MCNC: 44 MG/DL
HEMOGLOBIN: 15 G/DL (ref 11.9–15.1)
IMMATURE GRANULOCYTES: 0 %
LDL CHOLESTEROL: 83 MG/DL (ref 0–130)
LYMPHOCYTES # BLD: 25 % (ref 24–43)
MCH RBC QN AUTO: 27.9 PG (ref 25.2–33.5)
MCHC RBC AUTO-ENTMCNC: 31.7 G/DL (ref 28.4–34.8)
MCV RBC AUTO: 87.9 FL (ref 82.6–102.9)
MONOCYTES # BLD: 8 % (ref 3–12)
NRBC AUTOMATED: 0 PER 100 WBC
PDW BLD-RTO: 13.9 % (ref 11.8–14.4)
PLATELET # BLD: 286 K/UL (ref 138–453)
PLATELET ESTIMATE: ABNORMAL
PMV BLD AUTO: 10.8 FL (ref 8.1–13.5)
POTASSIUM SERPL-SCNC: 3.6 MMOL/L (ref 3.7–5.3)
RBC # BLD: 5.38 M/UL (ref 3.95–5.11)
RBC # BLD: ABNORMAL 10*6/UL
SEG NEUTROPHILS: 67 % (ref 36–65)
SEGMENTED NEUTROPHILS ABSOLUTE COUNT: 3.66 K/UL (ref 1.5–8.1)
SODIUM BLD-SCNC: 134 MMOL/L (ref 135–144)
TOTAL PROTEIN: 7 G/DL (ref 6.4–8.3)
TRIGL SERPL-MCNC: 155 MG/DL
VLDLC SERPL CALC-MCNC: ABNORMAL MG/DL (ref 1–30)
WBC # BLD: 5.5 K/UL (ref 3.5–11.3)
WBC # BLD: ABNORMAL 10*3/UL

## 2021-07-15 PROCEDURE — 36415 COLL VENOUS BLD VENIPUNCTURE: CPT

## 2021-07-15 PROCEDURE — 85025 COMPLETE CBC W/AUTO DIFF WBC: CPT

## 2021-07-15 PROCEDURE — 80053 COMPREHEN METABOLIC PANEL: CPT

## 2021-07-15 PROCEDURE — 80061 LIPID PANEL: CPT

## 2021-07-15 PROCEDURE — P9603 ONE-WAY ALLOW PRORATED MILES: HCPCS

## 2021-08-03 ENCOUNTER — HOSPITAL ENCOUNTER (OUTPATIENT)
Age: 71
Setting detail: SPECIMEN
Discharge: HOME | End: 2021-08-03
Payer: MEDICARE

## 2021-08-03 LAB
HEPATITIS C ANTIBODY: NONREACTIVE
HIV AG/AB: NONREACTIVE

## 2021-08-03 PROCEDURE — 86803 HEPATITIS C AB TEST: CPT

## 2021-08-03 PROCEDURE — 36415 COLL VENOUS BLD VENIPUNCTURE: CPT

## 2021-08-03 PROCEDURE — 87389 HIV-1 AG W/HIV-1&-2 AB AG IA: CPT

## 2021-08-03 PROCEDURE — P9603 ONE-WAY ALLOW PRORATED MILES: HCPCS

## 2022-01-03 ENCOUNTER — HOSPITAL ENCOUNTER (OUTPATIENT)
Age: 72
Discharge: HOME | End: 2022-01-03
Payer: MEDICARE

## 2022-01-03 LAB
EKG ATRIAL RATE: 77 BPM
EKG P AXIS: 49 DEGREES
EKG P-R INTERVAL: 166 MS
EKG Q-T INTERVAL: 358 MS
EKG QRS DURATION: 84 MS
EKG QTC CALCULATION (BAZETT): 405 MS
EKG R AXIS: 68 DEGREES
EKG T AXIS: -24 DEGREES
EKG VENTRICULAR RATE: 77 BPM

## 2022-01-03 PROCEDURE — 93010 ELECTROCARDIOGRAM REPORT: CPT | Performed by: INTERNAL MEDICINE

## 2022-01-03 PROCEDURE — 93005 ELECTROCARDIOGRAM TRACING: CPT | Performed by: INTERNAL MEDICINE

## 2022-01-18 ENCOUNTER — HOSPITAL ENCOUNTER (OUTPATIENT)
Age: 72
Setting detail: SPECIMEN
Discharge: HOME | End: 2022-01-18
Payer: MEDICARE

## 2022-01-18 LAB
ABSOLUTE EOS #: <0.03 K/UL (ref 0–0.44)
ABSOLUTE IMMATURE GRANULOCYTE: <0.03 K/UL (ref 0–0.3)
ABSOLUTE LYMPH #: 1.32 K/UL (ref 1.1–3.7)
ABSOLUTE MONO #: 0.44 K/UL (ref 0.1–1.2)
ALBUMIN SERPL-MCNC: 3.7 G/DL (ref 3.5–5.2)
ALBUMIN/GLOBULIN RATIO: 1.3 (ref 1–2.5)
ALP BLD-CCNC: 102 U/L (ref 35–104)
ALT SERPL-CCNC: 15 U/L (ref 5–33)
ANION GAP SERPL CALCULATED.3IONS-SCNC: 14 MMOL/L (ref 9–17)
AST SERPL-CCNC: 16 U/L
BASOPHILS # BLD: 0 % (ref 0–2)
BASOPHILS ABSOLUTE: <0.03 K/UL (ref 0–0.2)
BILIRUB SERPL-MCNC: 0.27 MG/DL (ref 0.3–1.2)
BUN BLDV-MCNC: 29 MG/DL (ref 8–23)
BUN/CREAT BLD: 35 (ref 9–20)
CALCIUM SERPL-MCNC: 9.7 MG/DL (ref 8.6–10.4)
CHLORIDE BLD-SCNC: 104 MMOL/L (ref 98–107)
CO2: 23 MMOL/L (ref 20–31)
CREAT SERPL-MCNC: 0.83 MG/DL (ref 0.5–0.9)
DIFFERENTIAL TYPE: ABNORMAL
EOSINOPHILS RELATIVE PERCENT: 0 % (ref 1–4)
GFR AFRICAN AMERICAN: >60 ML/MIN
GFR NON-AFRICAN AMERICAN: >60 ML/MIN
GFR SERPL CREATININE-BSD FRML MDRD: ABNORMAL ML/MIN/{1.73_M2}
GFR SERPL CREATININE-BSD FRML MDRD: ABNORMAL ML/MIN/{1.73_M2}
GLUCOSE BLD-MCNC: 93 MG/DL (ref 70–99)
HCT VFR BLD CALC: 46.4 % (ref 36.3–47.1)
HEMOGLOBIN: 14.6 G/DL (ref 11.9–15.1)
IMMATURE GRANULOCYTES: 0 %
LYMPHOCYTES # BLD: 23 % (ref 24–43)
MCH RBC QN AUTO: 27.5 PG (ref 25.2–33.5)
MCHC RBC AUTO-ENTMCNC: 31.5 G/DL (ref 28.4–34.8)
MCV RBC AUTO: 87.4 FL (ref 82.6–102.9)
MONOCYTES # BLD: 8 % (ref 3–12)
NRBC AUTOMATED: 0 PER 100 WBC
PDW BLD-RTO: 14.3 % (ref 11.8–14.4)
PLATELET # BLD: 272 K/UL (ref 138–453)
PLATELET ESTIMATE: ABNORMAL
PMV BLD AUTO: 11 FL (ref 8.1–13.5)
POTASSIUM SERPL-SCNC: 4.2 MMOL/L (ref 3.7–5.3)
RBC # BLD: 5.31 M/UL (ref 3.95–5.11)
RBC # BLD: ABNORMAL 10*6/UL
SEG NEUTROPHILS: 69 % (ref 36–65)
SEGMENTED NEUTROPHILS ABSOLUTE COUNT: 3.92 K/UL (ref 1.5–8.1)
SODIUM BLD-SCNC: 141 MMOL/L (ref 135–144)
TOTAL PROTEIN: 6.5 G/DL (ref 6.4–8.3)
WBC # BLD: 5.7 K/UL (ref 3.5–11.3)
WBC # BLD: ABNORMAL 10*3/UL

## 2022-01-18 PROCEDURE — 85025 COMPLETE CBC W/AUTO DIFF WBC: CPT

## 2022-01-18 PROCEDURE — P9604 ONE-WAY ALLOW PRORATED TRIP: HCPCS

## 2022-01-18 PROCEDURE — 80061 LIPID PANEL: CPT

## 2022-01-18 PROCEDURE — 80053 COMPREHEN METABOLIC PANEL: CPT

## 2022-01-18 PROCEDURE — 36415 COLL VENOUS BLD VENIPUNCTURE: CPT

## 2022-01-19 LAB
CHOLESTEROL/HDL RATIO: 3.6
CHOLESTEROL: 164 MG/DL
HDLC SERPL-MCNC: 45 MG/DL
LDL CHOLESTEROL: 103 MG/DL (ref 0–130)
TRIGL SERPL-MCNC: 81 MG/DL
VLDLC SERPL CALC-MCNC: NORMAL MG/DL (ref 1–30)

## 2022-05-09 ENCOUNTER — HOSPITAL ENCOUNTER (OUTPATIENT)
Dept: WOMENS IMAGING | Age: 72
Discharge: HOME | End: 2022-05-11
Payer: MEDICARE

## 2022-05-09 DIAGNOSIS — R92.8 ABNORMAL MAMMOGRAM: ICD-10-CM

## 2022-05-09 PROCEDURE — 77066 DX MAMMO INCL CAD BI: CPT

## 2022-07-14 ENCOUNTER — HOSPITAL ENCOUNTER (OUTPATIENT)
Age: 72
Setting detail: SPECIMEN
Discharge: HOME | End: 2022-07-14
Payer: MEDICARE

## 2022-07-14 LAB
ABSOLUTE EOS #: <0.03 K/UL (ref 0–0.44)
ABSOLUTE IMMATURE GRANULOCYTE: <0.03 K/UL (ref 0–0.3)
ABSOLUTE LYMPH #: 1.05 K/UL (ref 1.1–3.7)
ABSOLUTE MONO #: 0.39 K/UL (ref 0.1–1.2)
ALBUMIN SERPL-MCNC: 4.1 G/DL (ref 3.5–5.2)
ALBUMIN/GLOBULIN RATIO: 1.3 (ref 1–2.5)
ALP BLD-CCNC: 102 U/L (ref 35–104)
ALT SERPL-CCNC: 15 U/L (ref 5–33)
ANION GAP SERPL CALCULATED.3IONS-SCNC: 9 MMOL/L (ref 9–17)
AST SERPL-CCNC: 16 U/L
BASOPHILS # BLD: 0 % (ref 0–2)
BASOPHILS ABSOLUTE: <0.03 K/UL (ref 0–0.2)
BILIRUB SERPL-MCNC: 0.27 MG/DL (ref 0.3–1.2)
BUN BLDV-MCNC: 27 MG/DL (ref 8–23)
BUN/CREAT BLD: 38 (ref 9–20)
CALCIUM SERPL-MCNC: 9.8 MG/DL (ref 8.6–10.4)
CHLORIDE BLD-SCNC: 99 MMOL/L (ref 98–107)
CHOLESTEROL/HDL RATIO: 2.9
CHOLESTEROL: 160 MG/DL
CO2: 24 MMOL/L (ref 20–31)
CREAT SERPL-MCNC: 0.72 MG/DL (ref 0.5–0.9)
EOSINOPHILS RELATIVE PERCENT: 0 % (ref 1–4)
GFR AFRICAN AMERICAN: >60 ML/MIN
GFR NON-AFRICAN AMERICAN: >60 ML/MIN
GFR SERPL CREATININE-BSD FRML MDRD: ABNORMAL ML/MIN/{1.73_M2}
GFR SERPL CREATININE-BSD FRML MDRD: ABNORMAL ML/MIN/{1.73_M2}
GLUCOSE BLD-MCNC: 83 MG/DL (ref 70–99)
HCT VFR BLD CALC: 47.4 % (ref 36.3–47.1)
HDLC SERPL-MCNC: 56 MG/DL
HEMOGLOBIN: 15.1 G/DL (ref 11.9–15.1)
IMMATURE GRANULOCYTES: 0 %
LDL CHOLESTEROL: 90 MG/DL (ref 0–130)
LYMPHOCYTES # BLD: 22 % (ref 24–43)
MCH RBC QN AUTO: 28 PG (ref 25.2–33.5)
MCHC RBC AUTO-ENTMCNC: 31.9 G/DL (ref 28.4–34.8)
MCV RBC AUTO: 87.8 FL (ref 82.6–102.9)
MONOCYTES # BLD: 8 % (ref 3–12)
NRBC AUTOMATED: 0 PER 100 WBC
PDW BLD-RTO: 13.5 % (ref 11.8–14.4)
PLATELET # BLD: 235 K/UL (ref 138–453)
PMV BLD AUTO: 11.2 FL (ref 8.1–13.5)
POTASSIUM SERPL-SCNC: 4.4 MMOL/L (ref 3.7–5.3)
RBC # BLD: 5.4 M/UL (ref 3.95–5.11)
SEG NEUTROPHILS: 70 % (ref 36–65)
SEGMENTED NEUTROPHILS ABSOLUTE COUNT: 3.41 K/UL (ref 1.5–8.1)
SODIUM BLD-SCNC: 132 MMOL/L (ref 135–144)
TOTAL PROTEIN: 7.2 G/DL (ref 6.4–8.3)
TRIGL SERPL-MCNC: 71 MG/DL
WBC # BLD: 4.9 K/UL (ref 3.5–11.3)

## 2022-07-14 PROCEDURE — 85025 COMPLETE CBC W/AUTO DIFF WBC: CPT

## 2022-07-14 PROCEDURE — 36415 COLL VENOUS BLD VENIPUNCTURE: CPT

## 2022-07-14 PROCEDURE — 80061 LIPID PANEL: CPT

## 2022-07-14 PROCEDURE — P9604 ONE-WAY ALLOW PRORATED TRIP: HCPCS

## 2022-07-14 PROCEDURE — 80053 COMPREHEN METABOLIC PANEL: CPT

## 2022-07-18 ENCOUNTER — HOSPITAL ENCOUNTER (OUTPATIENT)
Age: 72
Discharge: HOME | End: 2022-07-18
Payer: MEDICARE

## 2022-07-18 LAB
EKG ATRIAL RATE: 86 BPM
EKG P AXIS: 13 DEGREES
EKG P-R INTERVAL: 144 MS
EKG Q-T INTERVAL: 346 MS
EKG QRS DURATION: 84 MS
EKG QTC CALCULATION (BAZETT): 414 MS
EKG R AXIS: 18 DEGREES
EKG T AXIS: -28 DEGREES
EKG VENTRICULAR RATE: 86 BPM

## 2022-07-18 PROCEDURE — 93010 ELECTROCARDIOGRAM REPORT: CPT | Performed by: INTERNAL MEDICINE

## 2022-07-18 PROCEDURE — 93005 ELECTROCARDIOGRAM TRACING: CPT | Performed by: INTERNAL MEDICINE

## 2023-01-17 ENCOUNTER — HOSPITAL ENCOUNTER (OUTPATIENT)
Age: 73
Setting detail: SPECIMEN
Discharge: HOME | End: 2023-01-17
Payer: MEDICARE

## 2023-01-17 LAB
ABSOLUTE EOS #: <0.03 K/UL (ref 0–0.44)
ABSOLUTE IMMATURE GRANULOCYTE: <0.03 K/UL (ref 0–0.3)
ABSOLUTE LYMPH #: 1.24 K/UL (ref 1.1–3.7)
ABSOLUTE MONO #: 0.38 K/UL (ref 0.1–1.2)
ALBUMIN SERPL-MCNC: 3.9 G/DL (ref 3.5–5.2)
ALBUMIN/GLOBULIN RATIO: 1.3 (ref 1–2.5)
ALP BLD-CCNC: 108 U/L (ref 35–104)
ALT SERPL-CCNC: 15 U/L (ref 5–33)
ANION GAP SERPL CALCULATED.3IONS-SCNC: 10 MMOL/L (ref 9–17)
AST SERPL-CCNC: 16 U/L
BASOPHILS # BLD: 0 % (ref 0–2)
BASOPHILS ABSOLUTE: <0.03 K/UL (ref 0–0.2)
BILIRUB SERPL-MCNC: 0.3 MG/DL (ref 0.3–1.2)
BUN BLDV-MCNC: 28 MG/DL (ref 8–23)
BUN/CREAT BLD: 38 (ref 9–20)
CALCIUM SERPL-MCNC: 9.6 MG/DL (ref 8.6–10.4)
CHLORIDE BLD-SCNC: 106 MMOL/L (ref 98–107)
CHOLESTEROL/HDL RATIO: 3
CHOLESTEROL: 155 MG/DL
CO2: 22 MMOL/L (ref 20–31)
CREAT SERPL-MCNC: 0.74 MG/DL (ref 0.5–0.9)
EOSINOPHILS RELATIVE PERCENT: 0 % (ref 1–4)
GFR SERPL CREATININE-BSD FRML MDRD: >60 ML/MIN/1.73M2
GLUCOSE BLD-MCNC: 95 MG/DL (ref 70–99)
HCT VFR BLD CALC: 48.4 % (ref 36.3–47.1)
HDLC SERPL-MCNC: 51 MG/DL
HEMOGLOBIN: 15.6 G/DL (ref 11.9–15.1)
IMMATURE GRANULOCYTES: 0 %
LDL CHOLESTEROL: 92 MG/DL (ref 0–130)
LYMPHOCYTES # BLD: 26 % (ref 24–43)
MCH RBC QN AUTO: 27.5 PG (ref 25.2–33.5)
MCHC RBC AUTO-ENTMCNC: 32.2 G/DL (ref 28.4–34.8)
MCV RBC AUTO: 85.2 FL (ref 82.6–102.9)
MONOCYTES # BLD: 8 % (ref 3–12)
NRBC AUTOMATED: 0 PER 100 WBC
PDW BLD-RTO: 14.2 % (ref 11.8–14.4)
PLATELET # BLD: 268 K/UL (ref 138–453)
PMV BLD AUTO: 11 FL (ref 8.1–13.5)
POTASSIUM SERPL-SCNC: 4.4 MMOL/L (ref 3.7–5.3)
RBC # BLD: 5.68 M/UL (ref 3.95–5.11)
SEG NEUTROPHILS: 66 % (ref 36–65)
SEGMENTED NEUTROPHILS ABSOLUTE COUNT: 3.05 K/UL (ref 1.5–8.1)
SODIUM BLD-SCNC: 138 MMOL/L (ref 135–144)
TOTAL PROTEIN: 6.9 G/DL (ref 6.4–8.3)
TRIGL SERPL-MCNC: 62 MG/DL
WBC # BLD: 4.7 K/UL (ref 3.5–11.3)

## 2023-01-17 PROCEDURE — 80061 LIPID PANEL: CPT

## 2023-01-17 PROCEDURE — P9603 ONE-WAY ALLOW PRORATED MILES: HCPCS

## 2023-01-17 PROCEDURE — 85025 COMPLETE CBC W/AUTO DIFF WBC: CPT

## 2023-01-17 PROCEDURE — 80053 COMPREHEN METABOLIC PANEL: CPT

## 2023-01-23 ENCOUNTER — HOSPITAL ENCOUNTER (OUTPATIENT)
Dept: GENERAL RADIOLOGY | Age: 73
Discharge: HOME | End: 2023-01-25
Payer: MEDICARE

## 2023-01-23 DIAGNOSIS — R13.10 DYSPHAGIA, UNSPECIFIED TYPE: ICD-10-CM

## 2023-01-23 PROCEDURE — 6360000004 HC RX CONTRAST MEDICATION: Performed by: INTERNAL MEDICINE

## 2023-01-23 PROCEDURE — 92611 MOTION FLUOROSCOPY/SWALLOW: CPT

## 2023-01-23 PROCEDURE — A4641 RADIOPHARM DX AGENT NOC: HCPCS | Performed by: INTERNAL MEDICINE

## 2023-01-23 PROCEDURE — 74230 X-RAY XM SWLNG FUNCJ C+: CPT

## 2023-01-23 RX ADMIN — BARIUM SULFATE 355 ML: 0.6 SUSPENSION ORAL at 09:32

## 2023-01-23 NOTE — PROCEDURES
INSTRUMENTAL SWALLOW REPORT  MODIFIED BARIUM SWALLOW    NAME: Garrett Mcardle   : 1950  MRN: [id number]       Date of Eval: 2023     Ordering Physician: Dr. Tianna Jeffries  Radiologist: Dr. Cyndie Rogers     Referring Diagnosis(es): Referring Diagnosis: r13.10 Dysphagia, Unspecified. Past Medical History:  has a past medical history of Arthritis, Falls frequently, Femur fracture ([address] Fibrocystic breast, Hyperlipidemia, Hypertension, Kyphosis deformity of spine, Mental retardation, Osteoporosis, Pelvis fracture ([address] 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[address] Scoliosis deformity of spine, and Tardive dyskinesia. Past Surgical History:  has a past surgical history that includes Fibula Fracture Surgery (Left, 1985); polypectomy (1995); orif femur decompression (Right, 2011); Foot surgery (Left, 2012); Foot surgery (Left, 2015); Foot surgery (N/A, 2013); Foot surgery (Right, 10/2012); and other surgical history (Left, 10/16/15). Type of Study: Initial MBS       Recent CXR/CT of Chest: No recent chest x-ray noted in chart. Patient Complaints/Reason for Referral:  Garrett Mcardle was referred for a MBS to assess the efficiency of his/her swallow function, assess for aspiration, and to make recommendations regarding safe dietary consistencies, effective compensatory strategies, and safe eating environment. Patient complaints: Per treating SLP, patient frequently coughs midway between meals or after meals. They are unable to rule out aspiration as cause of cough and seek MBSS to objectively assess pharyngeal phase of swallow. Behavior/Cognition/Vision/Hearing:  Behavior/Cognition: Alert;Agitated; Impulsive; Requires cueing  Vision: Within Functional Limits  Hearing: Within functional limits    Impressions:  Treatment Dx and ICD 10: r13.10 Dysphagia, Unspecified. Patient Position: Lateral     Consistencies Administered: Soft & Bite Sized;Pureed;Regular; Thin straw    Compensatory Swallowing Strategies Attempted: Eat/Feed slowly;Upright as possible for all oral intake;Small bites/sips         Dysphagia Outcome Severity Scale: Level 5: Mild dysphagia- Distant supervision. May need one diet consistency restricted  Penetration-Aspiration Scale (PAS): 1 - Material does not enter the airway    Recommended Diet:  Solid consistency: Other (comment) (Chopped)  Liquid consistency: Thin  Liquid administration via: Straw;Cup    Medication administration: PO    Safe Swallow Protocol:  Supervision: Distant  Compensatory Swallowing Strategies : Alternate solids and liquids;Eat/Feed slowly;Upright as possible for all oral intake;Remain upright for 30-45 minutes after meals;Small bites/sips              Recommendations/Treatment  Requires SLP Intervention: Yes (Follow up with treating SLP at [facility])           Postural Changes and/or Swallow Maneuvers: Upright          Education: Images and recommendations were reviewed with the patient and caregiver following this exam.   Patient Education: ST educated caregiver re: results of evaluation, diet recommendations, and plan of care. Patient Education Response: Verbalizes understanding    Prognosis  Prognosis for safe diet advancement: fair        Oral Preparation / Oral Phase    Oral Phase: Patient presents with mild oral phase dysphagia characterized by prolonged mastication of regular and soft and bite sized solids. Patient also demonstrated poor bolus containinment with mixed consistencies. Patient with good understanding to finish bite before taking additional bite. Pharyngeal Phase  Pharyngeal Phase: Impaired  Pharyngeal Phase - Major Contributing Deficits  Premature Spillage to Valleculae: Mechanical soft solid  Pooling Valleculae: Mechanical soft solid  Pharyngeal: Patient presents with a minimal pharyngeal phase deficit. Patient demonstrated premature spillage to the valleculae with mixed consistency barium-coated banana.   Patient demonstrated no aspiration or penetration with any consistencies trialed. Patient demonstrated no vallecular or pyriform sinus residues after any consistencies. Patient did experience dried cough after trials were completed. Flouro was able to be turned on during cough. Patient demonstrated no apparent residue in laryngeal vestibule or in pharynx to cause cough. Esophageal Phase  Esophageal Screen: Impaired  Upper Esophageal Screen- Major Contributing Deficits  Major Contributing Deficits: Esophageal Backflow into the Upper Esophagus      Upper Esophageal Screen: Patient did appear to have esophageal backflow into mid-upper esophagus. Although not seen on flouro, can not rule out esophageal reflux as cause of cough. Pain   Patient does not c/o of pain. Therapy Time:   Individual   Time In  0900   Time Out 0923   Minutes 23         Patient presents with caregiver from [facility]. Per treating SLP, patient frequently coughs midway between meals or after meals. They are unable to rule out aspiration as cause of cough and seek MBSS to objectively assess pharyngeal phase of swallow. Patient is currently on a \"chopped\" diet and thin liquids. Patient presents with mild oral phase dysphagia characterized by prolonged mastication of regular and soft and bite sized solids. Patient also demonstrated poor bolus containinment with mixed consistencies. Patient with good understanding to finish bite before taking additional bite. Patient presents with a minimal pharyngeal phase deficit. Patient demonstrated premature spillage to the valleculae with mixed consistency barium-coated banana. Patient demonstrated no aspiration or penetration with any consistencies trialed. Patient demonstrated no vallecular or pyriform sinus residues after any consistencies. Patient did experience dried cough after trials were completed. Flouro was able to be turned on during cough.  Patient demonstrated no apparent residue in laryngeal vestibule or in pharynx to cause cough. Patient did appear to have esophageal backflow into mid-upper esophagus. Although not seen on flouro, can not rule out esophageal reflux as cause of cough. ST recommends continued diet of \"chopped\" solids and thin liquids. Compensatory swallowing strategies should include: small bites/sips, pacing/slow rate, alternate solids/liquids, upright during and 30-45 minutes after PO intake. Patient may benefit from GI consult and/or consultation with doctor re: management of GERD.     Joyice Libman, SLP, 1/23/2023, 9:35 AM

## 2023-01-24 ENCOUNTER — HOSPITAL ENCOUNTER (OUTPATIENT)
Age: 73
Discharge: HOME | End: 2023-01-24

## 2023-01-24 LAB
EKG ATRIAL RATE: 68 BPM
EKG P AXIS: 56 DEGREES
EKG P-R INTERVAL: 162 MS
EKG Q-T INTERVAL: 366 MS
EKG QRS DURATION: 86 MS
EKG QTC CALCULATION (BAZETT): 389 MS
EKG R AXIS: 58 DEGREES
EKG T AXIS: 36 DEGREES
EKG VENTRICULAR RATE: 68 BPM

## 2023-03-02 ENCOUNTER — APPOINTMENT (OUTPATIENT)
Dept: GENERAL RADIOLOGY | Age: 73
DRG: 872 | End: 2023-03-02
Payer: MEDICARE

## 2023-03-02 ENCOUNTER — HOSPITAL ENCOUNTER (INPATIENT)
Age: 73
LOS: 3 days | Discharge: HOME | DRG: 872 | End: 2023-03-06
Attending: EMERGENCY MEDICINE | Admitting: INTERNAL MEDICINE
Payer: MEDICARE

## 2023-03-02 ENCOUNTER — APPOINTMENT (OUTPATIENT)
Dept: CT IMAGING | Age: 73
DRG: 872 | End: 2023-03-02
Payer: MEDICARE

## 2023-03-02 DIAGNOSIS — A41.9 SEPSIS, DUE TO UNSPECIFIED ORGANISM, UNSPECIFIED WHETHER ACUTE ORGAN DYSFUNCTION PRESENT (HCC): Primary | ICD-10-CM

## 2023-03-02 DIAGNOSIS — E86.0 DEHYDRATION: ICD-10-CM

## 2023-03-02 DIAGNOSIS — S90.811A: ICD-10-CM

## 2023-03-02 DIAGNOSIS — K52.9 GASTROENTERITIS: ICD-10-CM

## 2023-03-02 DIAGNOSIS — L08.9: ICD-10-CM

## 2023-03-02 LAB
ABSOLUTE EOS #: 0.19 K/UL (ref 0–0.44)
ABSOLUTE IMMATURE GRANULOCYTE: 0 K/UL (ref 0–0.3)
ABSOLUTE LYMPH #: 0.94 K/UL (ref 1.1–3.7)
ABSOLUTE MONO #: 0.56 K/UL (ref 0.1–1.2)
ALBUMIN SERPL-MCNC: 4 G/DL (ref 3.5–5.2)
ALBUMIN/GLOBULIN RATIO: 1.3 (ref 1–2.5)
ALP SERPL-CCNC: 112 U/L (ref 35–104)
ALT SERPL-CCNC: 17 U/L (ref 5–33)
ANION GAP SERPL CALCULATED.3IONS-SCNC: 11 MMOL/L (ref 9–17)
AST SERPL-CCNC: 15 U/L
BASOPHILS # BLD: 0 % (ref 0–2)
BASOPHILS ABSOLUTE: 0 K/UL (ref 0–0.2)
BILIRUB SERPL-MCNC: 0.3 MG/DL (ref 0.3–1.2)
BILIRUBIN URINE: NEGATIVE
BUN SERPL-MCNC: 29 MG/DL (ref 8–23)
BUN/CREAT BLD: 33 (ref 9–20)
CALCIUM SERPL-MCNC: 9.4 MG/DL (ref 8.6–10.4)
CHLORIDE SERPL-SCNC: 102 MMOL/L (ref 98–107)
CO2 SERPL-SCNC: 23 MMOL/L (ref 20–31)
COLOR: YELLOW
CREAT SERPL-MCNC: 0.89 MG/DL (ref 0.5–0.9)
EOSINOPHILS RELATIVE PERCENT: 1 % (ref 1–4)
EPITHELIAL CELLS UA: ABNORMAL /HPF (ref 0–25)
FLUAV AG SPEC QL: NEGATIVE
FLUBV AG SPEC QL: NEGATIVE
GFR SERPL CREATININE-BSD FRML MDRD: >60 ML/MIN/1.73M2
GLUCOSE SERPL-MCNC: 158 MG/DL (ref 70–99)
GLUCOSE UR STRIP.AUTO-MCNC: NEGATIVE MG/DL
HCT VFR BLD AUTO: 46.2 % (ref 36.3–47.1)
HGB BLD-MCNC: 15.3 G/DL (ref 11.9–15.1)
IMMATURE GRANULOCYTES: 0 %
INR PPP: 1.1
KETONES UR STRIP.AUTO-MCNC: NEGATIVE MG/DL
LACTIC ACID, SEPSIS: 2.2 MMOL/L (ref 0.5–1.9)
LEUKOCYTE ESTERASE UR QL STRIP.AUTO: NEGATIVE
LYMPHOCYTES # BLD: 5 % (ref 24–43)
MCH RBC QN AUTO: 28.5 PG (ref 25.2–33.5)
MCHC RBC AUTO-ENTMCNC: 33.1 G/DL (ref 28.4–34.8)
MCV RBC AUTO: 86 FL (ref 82.6–102.9)
MONOCYTES # BLD: 3 % (ref 3–12)
MORPHOLOGY: NORMAL
MUCUS: ABNORMAL
NITRITE UR QL STRIP.AUTO: NEGATIVE
NRBC AUTOMATED: 0 PER 100 WBC
PDW BLD-RTO: 14.6 % (ref 11.8–14.4)
PLATELET # BLD AUTO: 257 K/UL (ref 138–453)
PMV BLD AUTO: 10.4 FL (ref 8.1–13.5)
POTASSIUM SERPL-SCNC: 4.1 MMOL/L (ref 3.7–5.3)
PROT SERPL-MCNC: 7.2 G/DL (ref 6.4–8.3)
PROT UR STRIP.AUTO-MCNC: 6 MG/DL (ref 5–9)
PROT UR STRIP.AUTO-MCNC: NEGATIVE MG/DL
PROTHROMBIN TIME: 13.9 SEC (ref 11.5–14.2)
RBC # BLD: 5.37 M/UL (ref 3.95–5.11)
RBC CLUMPS #/AREA URNS AUTO: ABNORMAL /HPF (ref 0–2)
SARS-COV-2 RDRP RESP QL NAA+PROBE: NOT DETECTED
SEG NEUTROPHILS: 91 % (ref 36–65)
SEGMENTED NEUTROPHILS ABSOLUTE COUNT: 17.01 K/UL (ref 1.5–8.1)
SODIUM SERPL-SCNC: 136 MMOL/L (ref 135–144)
SPECIFIC GRAVITY UA: 1.02 (ref 1.01–1.02)
SPECIMEN DESCRIPTION: NORMAL
TROPONIN I SERPL DL<=0.01 NG/ML-MCNC: 13 NG/L (ref 0–14)
TURBIDITY: CLEAR
URINE HGB: NEGATIVE
UROBILINOGEN, URINE: NORMAL
WBC # BLD AUTO: 18.7 K/UL (ref 3.5–11.3)
WBC UA: ABNORMAL /HPF (ref 0–5)

## 2023-03-02 PROCEDURE — 85610 PROTHROMBIN TIME: CPT

## 2023-03-02 PROCEDURE — 6370000000 HC RX 637 (ALT 250 FOR IP): Performed by: EMERGENCY MEDICINE

## 2023-03-02 PROCEDURE — 87804 INFLUENZA ASSAY W/OPTIC: CPT

## 2023-03-02 PROCEDURE — 71045 X-RAY EXAM CHEST 1 VIEW: CPT

## 2023-03-02 PROCEDURE — 96361 HYDRATE IV INFUSION ADD-ON: CPT

## 2023-03-02 PROCEDURE — 6360000004 HC RX CONTRAST MEDICATION: Performed by: EMERGENCY MEDICINE

## 2023-03-02 PROCEDURE — 96367 TX/PROPH/DG ADDL SEQ IV INF: CPT

## 2023-03-02 PROCEDURE — 99285 EMERGENCY DEPT VISIT HI MDM: CPT

## 2023-03-02 PROCEDURE — 2580000003 HC RX 258: Performed by: EMERGENCY MEDICINE

## 2023-03-02 PROCEDURE — 96365 THER/PROPH/DIAG IV INF INIT: CPT

## 2023-03-02 PROCEDURE — 87635 SARS-COV-2 COVID-19 AMP PRB: CPT

## 2023-03-02 PROCEDURE — C9803 HOPD COVID-19 SPEC COLLECT: HCPCS

## 2023-03-02 PROCEDURE — 81001 URINALYSIS AUTO W/SCOPE: CPT

## 2023-03-02 PROCEDURE — 87040 BLOOD CULTURE FOR BACTERIA: CPT

## 2023-03-02 PROCEDURE — 71260 CT THORAX DX C+: CPT

## 2023-03-02 PROCEDURE — 80053 COMPREHEN METABOLIC PANEL: CPT

## 2023-03-02 PROCEDURE — 85025 COMPLETE CBC W/AUTO DIFF WBC: CPT

## 2023-03-02 PROCEDURE — 84484 ASSAY OF TROPONIN QUANT: CPT

## 2023-03-02 PROCEDURE — 83605 ASSAY OF LACTIC ACID: CPT

## 2023-03-02 PROCEDURE — 93005 ELECTROCARDIOGRAM TRACING: CPT | Performed by: EMERGENCY MEDICINE

## 2023-03-02 RX ORDER — IBUPROFEN 600 MG/1
600 TABLET ORAL ONCE
Status: COMPLETED | OUTPATIENT
Start: 2023-03-02 | End: 2023-03-02

## 2023-03-02 RX ORDER — 0.9 % SODIUM CHLORIDE 0.9 %
1000 INTRAVENOUS SOLUTION INTRAVENOUS ONCE
Status: COMPLETED | OUTPATIENT
Start: 2023-03-02 | End: 2023-03-02

## 2023-03-02 RX ADMIN — SODIUM CHLORIDE 1000 ML: 9 INJECTION, SOLUTION INTRAVENOUS at 21:09

## 2023-03-02 RX ADMIN — IBUPROFEN 600 MG: 600 TABLET ORAL at 21:13

## 2023-03-02 RX ADMIN — IOPAMIDOL 75 ML: 755 INJECTION, SOLUTION INTRAVENOUS at 22:59

## 2023-03-03 ENCOUNTER — ANESTHESIA (OUTPATIENT)
Dept: OPERATING ROOM | Age: 73
End: 2023-03-03
Payer: MEDICARE

## 2023-03-03 ENCOUNTER — ANESTHESIA EVENT (OUTPATIENT)
Dept: OPERATING ROOM | Age: 73
End: 2023-03-03
Payer: MEDICARE

## 2023-03-03 ENCOUNTER — APPOINTMENT (OUTPATIENT)
Dept: CT IMAGING | Age: 73
DRG: 872 | End: 2023-03-03
Payer: MEDICARE

## 2023-03-03 PROBLEM — F20.9 SCHIZOPHRENIC DISORDER (HCC): Status: ACTIVE | Noted: 2023-03-03

## 2023-03-03 PROBLEM — F79 INTELLECTUAL DISABILITY: Status: ACTIVE | Noted: 2023-03-03

## 2023-03-03 PROBLEM — I10 HYPERTENSION: Status: ACTIVE | Noted: 2023-03-03

## 2023-03-03 PROBLEM — A41.9 SEPSIS (HCC): Status: ACTIVE | Noted: 2023-03-03

## 2023-03-03 PROBLEM — L03.115 CELLULITIS OF RIGHT LOWER EXTREMITY: Status: ACTIVE | Noted: 2023-03-03

## 2023-03-03 PROBLEM — G24.01 TARDIVE DYSKINESIA: Status: ACTIVE | Noted: 2023-03-03

## 2023-03-03 LAB
ADENOVIRUS PCR: NOT DETECTED
ALBUMIN SERPL-MCNC: 3.3 G/DL (ref 3.5–5.2)
ALBUMIN/GLOBULIN RATIO: 1.1 (ref 1–2.5)
ALP SERPL-CCNC: 97 U/L (ref 35–104)
ALT SERPL-CCNC: 14 U/L (ref 5–33)
ANION GAP SERPL CALCULATED.3IONS-SCNC: 9 MMOL/L (ref 9–17)
AST SERPL-CCNC: 15 U/L
B PARAP IS1001 DNA NPH QL NAA+NON-PROBE: NOT DETECTED
B PERT DNA SPEC QL NAA+PROBE: NOT DETECTED
BILIRUB SERPL-MCNC: 0.4 MG/DL (ref 0.3–1.2)
BNP SERPL-MCNC: 370 PG/ML
BUN SERPL-MCNC: 21 MG/DL (ref 8–23)
BUN/CREAT BLD: 30 (ref 9–20)
CALCIUM SERPL-MCNC: 8.6 MG/DL (ref 8.6–10.4)
CHLAMYDIA PNEUMONIAE BY PCR: NOT DETECTED
CHLORIDE SERPL-SCNC: 107 MMOL/L (ref 98–107)
CO2 SERPL-SCNC: 21 MMOL/L (ref 20–31)
CORONAVIRUS 229E PCR: NOT DETECTED
CORONAVIRUS HKU1 PCR: NOT DETECTED
CORONAVIRUS NL63 PCR: NOT DETECTED
CORONAVIRUS OC43 PCR: NOT DETECTED
CREAT SERPL-MCNC: 0.69 MG/DL (ref 0.5–0.9)
CRP SERPL HS-MCNC: 87.5 MG/L (ref 0–5)
EKG ATRIAL RATE: 119 BPM
EKG P AXIS: 39 DEGREES
EKG P-R INTERVAL: 154 MS
EKG Q-T INTERVAL: 316 MS
EKG QRS DURATION: 86 MS
EKG QTC CALCULATION (BAZETT): 444 MS
EKG R AXIS: 49 DEGREES
EKG T AXIS: 8 DEGREES
EKG VENTRICULAR RATE: 119 BPM
FLUAV RNA NPH QL NAA+NON-PROBE: NOT DETECTED
FLUBV RNA NPH QL NAA+NON-PROBE: NOT DETECTED
GFR SERPL CREATININE-BSD FRML MDRD: >60 ML/MIN/1.73M2
GLUCOSE SERPL-MCNC: 135 MG/DL (ref 70–99)
HUMAN METAPNEUMOVIRUS PCR: NOT DETECTED
LACTATE PLASV-SCNC: 1.2 MMOL/L (ref 0.5–2.2)
LIPASE SERPL-CCNC: 13 U/L (ref 13–60)
MYCOPLASMA PNEUMONIAE PCR: NOT DETECTED
PARAINFLUENZA 1 PCR: NOT DETECTED
PARAINFLUENZA 2 PCR: NOT DETECTED
PARAINFLUENZA 3 PCR: NOT DETECTED
PARAINFLUENZA 4 PCR: NOT DETECTED
POTASSIUM SERPL-SCNC: 3.8 MMOL/L (ref 3.7–5.3)
PROT SERPL-MCNC: 6.2 G/DL (ref 6.4–8.3)
RESP SYNCYTIAL VIRUS PCR: NOT DETECTED
RHINO/ENTEROVIRUS PCR: NOT DETECTED
SARS-COV-2 RNA NPH QL NAA+NON-PROBE: NOT DETECTED
SODIUM SERPL-SCNC: 137 MMOL/L (ref 135–144)
SPECIMEN DESCRIPTION: NORMAL

## 2023-03-03 PROCEDURE — 83690 ASSAY OF LIPASE: CPT

## 2023-03-03 PROCEDURE — 99254 IP/OBS CNSLTJ NEW/EST MOD 60: CPT | Performed by: INTERNAL MEDICINE

## 2023-03-03 PROCEDURE — 87077 CULTURE AEROBIC IDENTIFY: CPT

## 2023-03-03 PROCEDURE — 6370000000 HC RX 637 (ALT 250 FOR IP): Performed by: PODIATRIST

## 2023-03-03 PROCEDURE — 2580000003 HC RX 258: Performed by: INTERNAL MEDICINE

## 2023-03-03 PROCEDURE — 3700000001 HC ADD 15 MINUTES (ANESTHESIA): Performed by: PODIATRIST

## 2023-03-03 PROCEDURE — 2580000003 HC RX 258: Performed by: NURSE PRACTITIONER

## 2023-03-03 PROCEDURE — 6370000000 HC RX 637 (ALT 250 FOR IP): Performed by: NURSE ANESTHETIST, CERTIFIED REGISTERED

## 2023-03-03 PROCEDURE — 2580000003 HC RX 258: Performed by: FAMILY MEDICINE

## 2023-03-03 PROCEDURE — 96365 THER/PROPH/DIAG IV INF INIT: CPT

## 2023-03-03 PROCEDURE — 87176 TISSUE HOMOGENIZATION CULTR: CPT

## 2023-03-03 PROCEDURE — 3600000012 HC SURGERY LEVEL 2 ADDTL 15MIN: Performed by: PODIATRIST

## 2023-03-03 PROCEDURE — 6360000002 HC RX W HCPCS: Performed by: NURSE PRACTITIONER

## 2023-03-03 PROCEDURE — 87075 CULTR BACTERIA EXCEPT BLOOD: CPT

## 2023-03-03 PROCEDURE — 1200000000 HC SEMI PRIVATE

## 2023-03-03 PROCEDURE — 87070 CULTURE OTHR SPECIMN AEROBIC: CPT

## 2023-03-03 PROCEDURE — 6370000000 HC RX 637 (ALT 250 FOR IP): Performed by: NURSE PRACTITIONER

## 2023-03-03 PROCEDURE — 6360000002 HC RX W HCPCS: Performed by: FAMILY MEDICINE

## 2023-03-03 PROCEDURE — 7100000001 HC PACU RECOVERY - ADDTL 15 MIN: Performed by: PODIATRIST

## 2023-03-03 PROCEDURE — 86403 PARTICLE AGGLUT ANTBDY SCRN: CPT

## 2023-03-03 PROCEDURE — 6360000002 HC RX W HCPCS: Performed by: NURSE ANESTHETIST, CERTIFIED REGISTERED

## 2023-03-03 PROCEDURE — 6370000000 HC RX 637 (ALT 250 FOR IP)

## 2023-03-03 PROCEDURE — 6360000002 HC RX W HCPCS: Performed by: PODIATRIST

## 2023-03-03 PROCEDURE — 73701 CT LOWER EXTREMITY W/DYE: CPT

## 2023-03-03 PROCEDURE — 2580000003 HC RX 258: Performed by: PODIATRIST

## 2023-03-03 PROCEDURE — 83605 ASSAY OF LACTIC ACID: CPT

## 2023-03-03 PROCEDURE — 2500000003 HC RX 250 WO HCPCS: Performed by: EMERGENCY MEDICINE

## 2023-03-03 PROCEDURE — 96367 TX/PROPH/DG ADDL SEQ IV INF: CPT

## 2023-03-03 PROCEDURE — 94761 N-INVAS EAR/PLS OXIMETRY MLT: CPT

## 2023-03-03 PROCEDURE — 87205 SMEAR GRAM STAIN: CPT

## 2023-03-03 PROCEDURE — 6370000000 HC RX 637 (ALT 250 FOR IP): Performed by: EMERGENCY MEDICINE

## 2023-03-03 PROCEDURE — 3600000002 HC SURGERY LEVEL 2 BASE: Performed by: PODIATRIST

## 2023-03-03 PROCEDURE — 80053 COMPREHEN METABOLIC PANEL: CPT

## 2023-03-03 PROCEDURE — 2500000003 HC RX 250 WO HCPCS: Performed by: NURSE ANESTHETIST, CERTIFIED REGISTERED

## 2023-03-03 PROCEDURE — 83880 ASSAY OF NATRIURETIC PEPTIDE: CPT

## 2023-03-03 PROCEDURE — 87641 MR-STAPH DNA AMP PROBE: CPT

## 2023-03-03 PROCEDURE — 86140 C-REACTIVE PROTEIN: CPT

## 2023-03-03 PROCEDURE — 6360000002 HC RX W HCPCS: Performed by: EMERGENCY MEDICINE

## 2023-03-03 PROCEDURE — 36415 COLL VENOUS BLD VENIPUNCTURE: CPT

## 2023-03-03 PROCEDURE — 0HBMXZZ EXCISION OF RIGHT FOOT SKIN, EXTERNAL APPROACH: ICD-10-PCS | Performed by: PODIATRIST

## 2023-03-03 PROCEDURE — APPSS60 APP SPLIT SHARED TIME 46-60 MINUTES: Performed by: NURSE PRACTITIONER

## 2023-03-03 PROCEDURE — 93010 ELECTROCARDIOGRAM REPORT: CPT | Performed by: INTERNAL MEDICINE

## 2023-03-03 PROCEDURE — 2709999900 HC NON-CHARGEABLE SUPPLY: Performed by: PODIATRIST

## 2023-03-03 PROCEDURE — 2580000003 HC RX 258: Performed by: NURSE ANESTHETIST, CERTIFIED REGISTERED

## 2023-03-03 PROCEDURE — 0202U NFCT DS 22 TRGT SARS-COV-2: CPT

## 2023-03-03 PROCEDURE — 94640 AIRWAY INHALATION TREATMENT: CPT

## 2023-03-03 PROCEDURE — 2580000003 HC RX 258: Performed by: EMERGENCY MEDICINE

## 2023-03-03 PROCEDURE — 94664 DEMO&/EVAL PT USE INHALER: CPT

## 2023-03-03 PROCEDURE — 7100000000 HC PACU RECOVERY - FIRST 15 MIN: Performed by: PODIATRIST

## 2023-03-03 PROCEDURE — 6360000004 HC RX CONTRAST MEDICATION: Performed by: NURSE PRACTITIONER

## 2023-03-03 PROCEDURE — 3700000000 HC ANESTHESIA ATTENDED CARE: Performed by: PODIATRIST

## 2023-03-03 PROCEDURE — 6360000002 HC RX W HCPCS: Performed by: INTERNAL MEDICINE

## 2023-03-03 RX ORDER — ATORVASTATIN CALCIUM 20 MG/1
20 TABLET, FILM COATED ORAL DAILY
Status: DISCONTINUED | OUTPATIENT
Start: 2023-03-03 | End: 2023-03-06 | Stop reason: HOSPADM

## 2023-03-03 RX ORDER — ONDANSETRON 2 MG/ML
INJECTION INTRAMUSCULAR; INTRAVENOUS PRN
Status: DISCONTINUED | OUTPATIENT
Start: 2023-03-03 | End: 2023-03-03 | Stop reason: SDUPTHER

## 2023-03-03 RX ORDER — BENZONATATE 200 MG/1
200 CAPSULE ORAL 3 TIMES DAILY PRN
COMMUNITY

## 2023-03-03 RX ORDER — SODIUM CHLORIDE 9 MG/ML
INJECTION, SOLUTION INTRAVENOUS PRN
Status: DISCONTINUED | OUTPATIENT
Start: 2023-03-03 | End: 2023-03-06 | Stop reason: HOSPADM

## 2023-03-03 RX ORDER — ACETAMINOPHEN 325 MG/1
650 TABLET ORAL EVERY 6 HOURS PRN
Status: DISCONTINUED | OUTPATIENT
Start: 2023-03-03 | End: 2023-03-06 | Stop reason: HOSPADM

## 2023-03-03 RX ORDER — SODIUM CHLORIDE 9 MG/ML
INJECTION, SOLUTION INTRAVENOUS PRN
Status: DISCONTINUED | OUTPATIENT
Start: 2023-03-03 | End: 2023-03-03 | Stop reason: HOSPADM

## 2023-03-03 RX ORDER — ENOXAPARIN SODIUM 100 MG/ML
40 INJECTION SUBCUTANEOUS DAILY
Status: DISCONTINUED | OUTPATIENT
Start: 2023-03-03 | End: 2023-03-06 | Stop reason: HOSPADM

## 2023-03-03 RX ORDER — ACETAMINOPHEN 160 MG
2000 TABLET,DISINTEGRATING ORAL DAILY
COMMUNITY

## 2023-03-03 RX ORDER — KETOROLAC TROMETHAMINE 15 MG/ML
15 INJECTION, SOLUTION INTRAMUSCULAR; INTRAVENOUS EVERY 6 HOURS PRN
Status: DISCONTINUED | OUTPATIENT
Start: 2023-03-03 | End: 2023-03-06 | Stop reason: HOSPADM

## 2023-03-03 RX ORDER — LORAZEPAM 2 MG/ML
2 INJECTION INTRAMUSCULAR ONCE
Status: DISCONTINUED | OUTPATIENT
Start: 2023-03-03 | End: 2023-03-03

## 2023-03-03 RX ORDER — DEXAMETHASONE SODIUM PHOSPHATE 4 MG/ML
INJECTION, SOLUTION INTRA-ARTICULAR; INTRALESIONAL; INTRAMUSCULAR; INTRAVENOUS; SOFT TISSUE PRN
Status: DISCONTINUED | OUTPATIENT
Start: 2023-03-03 | End: 2023-03-03 | Stop reason: SDUPTHER

## 2023-03-03 RX ORDER — MELOXICAM 7.5 MG/1
7.5 TABLET ORAL DAILY
Status: DISCONTINUED | OUTPATIENT
Start: 2023-03-03 | End: 2023-03-06 | Stop reason: HOSPADM

## 2023-03-03 RX ORDER — LORAZEPAM 2 MG/ML
1 INJECTION INTRAMUSCULAR ONCE
Status: DISCONTINUED | OUTPATIENT
Start: 2023-03-03 | End: 2023-03-03

## 2023-03-03 RX ORDER — FERROUS SULFATE 325(65) MG
325 TABLET ORAL
Status: DISCONTINUED | OUTPATIENT
Start: 2023-03-03 | End: 2023-03-06 | Stop reason: HOSPADM

## 2023-03-03 RX ORDER — SUCCINYLCHOLINE CHLORIDE 20 MG/ML
INJECTION INTRAMUSCULAR; INTRAVENOUS PRN
Status: DISCONTINUED | OUTPATIENT
Start: 2023-03-03 | End: 2023-03-03 | Stop reason: SDUPTHER

## 2023-03-03 RX ORDER — SODIUM CHLORIDE 0.9 % (FLUSH) 0.9 %
5-40 SYRINGE (ML) INJECTION PRN
Status: DISCONTINUED | OUTPATIENT
Start: 2023-03-03 | End: 2023-03-06 | Stop reason: HOSPADM

## 2023-03-03 RX ORDER — LORAZEPAM 1 MG/1
2 TABLET ORAL ONCE
Status: COMPLETED | OUTPATIENT
Start: 2023-03-03 | End: 2023-03-03

## 2023-03-03 RX ORDER — M-VIT,TX,IRON,MINS/CALC/FOLIC 27MG-0.4MG
1 TABLET ORAL DAILY
Status: DISCONTINUED | OUTPATIENT
Start: 2023-03-03 | End: 2023-03-06 | Stop reason: HOSPADM

## 2023-03-03 RX ORDER — LIDOCAINE HYDROCHLORIDE 20 MG/ML
INJECTION, SOLUTION EPIDURAL; INFILTRATION; INTRACAUDAL; PERINEURAL PRN
Status: DISCONTINUED | OUTPATIENT
Start: 2023-03-03 | End: 2023-03-03 | Stop reason: SDUPTHER

## 2023-03-03 RX ORDER — ACETAMINOPHEN 650 MG/1
650 SUPPOSITORY RECTAL EVERY 6 HOURS PRN
Status: DISCONTINUED | OUTPATIENT
Start: 2023-03-03 | End: 2023-03-06 | Stop reason: HOSPADM

## 2023-03-03 RX ORDER — GUAIFENESIN/DEXTROMETHORPHAN 100-10MG/5
10 SYRUP ORAL EVERY 6 HOURS PRN
Status: DISCONTINUED | OUTPATIENT
Start: 2023-03-03 | End: 2023-03-06 | Stop reason: HOSPADM

## 2023-03-03 RX ORDER — DIPHENHYDRAMINE HCL 25 MG
25 CAPSULE ORAL EVERY 6 HOURS PRN
Status: DISCONTINUED | OUTPATIENT
Start: 2023-03-03 | End: 2023-03-06 | Stop reason: HOSPADM

## 2023-03-03 RX ORDER — 0.9 % SODIUM CHLORIDE 0.9 %
1000 INTRAVENOUS SOLUTION INTRAVENOUS ONCE
Status: COMPLETED | OUTPATIENT
Start: 2023-03-03 | End: 2023-03-03

## 2023-03-03 RX ORDER — LISINOPRIL 10 MG/1
10 TABLET ORAL DAILY
Status: DISCONTINUED | OUTPATIENT
Start: 2023-03-03 | End: 2023-03-06 | Stop reason: HOSPADM

## 2023-03-03 RX ORDER — METRONIDAZOLE 500 MG/100ML
500 INJECTION, SOLUTION INTRAVENOUS EVERY 8 HOURS
Status: DISCONTINUED | OUTPATIENT
Start: 2023-03-03 | End: 2023-03-03

## 2023-03-03 RX ORDER — FENTANYL CITRATE 50 UG/ML
INJECTION, SOLUTION INTRAMUSCULAR; INTRAVENOUS PRN
Status: DISCONTINUED | OUTPATIENT
Start: 2023-03-03 | End: 2023-03-03 | Stop reason: SDUPTHER

## 2023-03-03 RX ORDER — CIPROFLOXACIN 2 MG/ML
400 INJECTION, SOLUTION INTRAVENOUS EVERY 12 HOURS
Status: DISCONTINUED | OUTPATIENT
Start: 2023-03-03 | End: 2023-03-03

## 2023-03-03 RX ORDER — OLANZAPINE 5 MG/1
15 TABLET ORAL
Status: DISCONTINUED | OUTPATIENT
Start: 2023-03-03 | End: 2023-03-06 | Stop reason: HOSPADM

## 2023-03-03 RX ORDER — HYDRALAZINE HYDROCHLORIDE 20 MG/ML
10 INJECTION INTRAMUSCULAR; INTRAVENOUS
Status: DISCONTINUED | OUTPATIENT
Start: 2023-03-03 | End: 2023-03-03 | Stop reason: HOSPADM

## 2023-03-03 RX ORDER — CETIRIZINE HYDROCHLORIDE 10 MG/1
10 TABLET ORAL DAILY
Status: DISCONTINUED | OUTPATIENT
Start: 2023-03-03 | End: 2023-03-06 | Stop reason: HOSPADM

## 2023-03-03 RX ORDER — MIDAZOLAM HYDROCHLORIDE 1 MG/ML
INJECTION INTRAMUSCULAR; INTRAVENOUS PRN
Status: DISCONTINUED | OUTPATIENT
Start: 2023-03-03 | End: 2023-03-03 | Stop reason: SDUPTHER

## 2023-03-03 RX ORDER — PROMETHAZINE HYDROCHLORIDE 25 MG/1
25 SUPPOSITORY RECTAL EVERY 6 HOURS PRN
Status: DISCONTINUED | OUTPATIENT
Start: 2023-03-03 | End: 2023-03-06 | Stop reason: HOSPADM

## 2023-03-03 RX ORDER — DOCUSATE SODIUM 100 MG/1
100 CAPSULE, LIQUID FILLED ORAL 2 TIMES DAILY
Status: DISCONTINUED | OUTPATIENT
Start: 2023-03-03 | End: 2023-03-06 | Stop reason: HOSPADM

## 2023-03-03 RX ORDER — CEFAZOLIN SODIUM IN 0.9 % NACL 2 G/100 ML
2000 PLASTIC BAG, INJECTION (ML) INTRAVENOUS EVERY 8 HOURS
Status: DISCONTINUED | OUTPATIENT
Start: 2023-03-03 | End: 2023-03-03

## 2023-03-03 RX ORDER — SODIUM CHLORIDE, SODIUM LACTATE, POTASSIUM CHLORIDE, CALCIUM CHLORIDE 600; 310; 30; 20 MG/100ML; MG/100ML; MG/100ML; MG/100ML
INJECTION, SOLUTION INTRAVENOUS CONTINUOUS PRN
Status: DISCONTINUED | OUTPATIENT
Start: 2023-03-03 | End: 2023-03-03 | Stop reason: SDUPTHER

## 2023-03-03 RX ORDER — FENTANYL CITRATE 50 UG/ML
50 INJECTION, SOLUTION INTRAMUSCULAR; INTRAVENOUS EVERY 5 MIN PRN
Status: DISCONTINUED | OUTPATIENT
Start: 2023-03-03 | End: 2023-03-03 | Stop reason: HOSPADM

## 2023-03-03 RX ORDER — MORPHINE SULFATE 2 MG/ML
2 INJECTION, SOLUTION INTRAMUSCULAR; INTRAVENOUS ONCE
Status: COMPLETED | OUTPATIENT
Start: 2023-03-03 | End: 2023-03-03

## 2023-03-03 RX ORDER — ALBUTEROL SULFATE 2.5 MG/3ML
2.5 SOLUTION RESPIRATORY (INHALATION) EVERY 4 HOURS PRN
Status: DISCONTINUED | OUTPATIENT
Start: 2023-03-03 | End: 2023-03-03 | Stop reason: HOSPADM

## 2023-03-03 RX ORDER — PROPOFOL 10 MG/ML
INJECTION, EMULSION INTRAVENOUS PRN
Status: DISCONTINUED | OUTPATIENT
Start: 2023-03-03 | End: 2023-03-03 | Stop reason: SDUPTHER

## 2023-03-03 RX ORDER — TRAMADOL HYDROCHLORIDE 50 MG/1
50 TABLET ORAL EVERY 6 HOURS PRN
COMMUNITY

## 2023-03-03 RX ORDER — SODIUM CHLORIDE 0.9 % (FLUSH) 0.9 %
5-40 SYRINGE (ML) INJECTION EVERY 12 HOURS SCHEDULED
Status: DISCONTINUED | OUTPATIENT
Start: 2023-03-03 | End: 2023-03-03 | Stop reason: HOSPADM

## 2023-03-03 RX ORDER — VITAMIN B COMPLEX
2000 TABLET ORAL DAILY
Status: DISCONTINUED | OUTPATIENT
Start: 2023-03-03 | End: 2023-03-06 | Stop reason: HOSPADM

## 2023-03-03 RX ORDER — LORATADINE 10 MG/1
10 TABLET ORAL DAILY PRN
COMMUNITY

## 2023-03-03 RX ORDER — PANTOPRAZOLE SODIUM 40 MG/1
40 TABLET, DELAYED RELEASE ORAL
Status: DISCONTINUED | OUTPATIENT
Start: 2023-03-03 | End: 2023-03-06 | Stop reason: HOSPADM

## 2023-03-03 RX ORDER — MELOXICAM 7.5 MG/1
7.5 TABLET ORAL DAILY
COMMUNITY

## 2023-03-03 RX ORDER — WHEAT DEXTRIN 3 G/3.8 G
4 POWDER (GRAM) ORAL 2 TIMES DAILY WITH MEALS
COMMUNITY

## 2023-03-03 RX ORDER — METRONIDAZOLE 500 MG/100ML
500 INJECTION, SOLUTION INTRAVENOUS ONCE
Status: COMPLETED | OUTPATIENT
Start: 2023-03-03 | End: 2023-03-03

## 2023-03-03 RX ORDER — PROMETHAZINE HYDROCHLORIDE 25 MG/1
25 TABLET ORAL EVERY 6 HOURS PRN
Status: DISCONTINUED | OUTPATIENT
Start: 2023-03-03 | End: 2023-03-06 | Stop reason: HOSPADM

## 2023-03-03 RX ORDER — HYDROCHLOROTHIAZIDE 12.5 MG/1
12.5 CAPSULE, GELATIN COATED ORAL DAILY
Status: DISCONTINUED | OUTPATIENT
Start: 2023-03-03 | End: 2023-03-06 | Stop reason: HOSPADM

## 2023-03-03 RX ORDER — SODIUM CHLORIDE 0.9 % (FLUSH) 0.9 %
5-40 SYRINGE (ML) INJECTION PRN
Status: DISCONTINUED | OUTPATIENT
Start: 2023-03-03 | End: 2023-03-03 | Stop reason: HOSPADM

## 2023-03-03 RX ORDER — MAGNESIUM HYDROXIDE/ALUMINUM HYDROXICE/SIMETHICONE 120; 1200; 1200 MG/30ML; MG/30ML; MG/30ML
5 SUSPENSION ORAL EVERY 4 HOURS PRN
COMMUNITY

## 2023-03-03 RX ORDER — ALBUTEROL SULFATE 90 UG/1
AEROSOL, METERED RESPIRATORY (INHALATION) PRN
Status: DISCONTINUED | OUTPATIENT
Start: 2023-03-03 | End: 2023-03-03 | Stop reason: SDUPTHER

## 2023-03-03 RX ORDER — IPRATROPIUM BROMIDE AND ALBUTEROL SULFATE 2.5; .5 MG/3ML; MG/3ML
SOLUTION RESPIRATORY (INHALATION)
Status: COMPLETED
Start: 2023-03-03 | End: 2023-03-03

## 2023-03-03 RX ORDER — ASPIRIN 81 MG/1
81 TABLET ORAL DAILY
Status: DISCONTINUED | OUTPATIENT
Start: 2023-03-03 | End: 2023-03-06 | Stop reason: HOSPADM

## 2023-03-03 RX ORDER — LABETALOL HYDROCHLORIDE 5 MG/ML
10 INJECTION, SOLUTION INTRAVENOUS
Status: DISCONTINUED | OUTPATIENT
Start: 2023-03-03 | End: 2023-03-03 | Stop reason: HOSPADM

## 2023-03-03 RX ORDER — BENZONATATE 100 MG/1
200 CAPSULE ORAL 3 TIMES DAILY PRN
Status: DISCONTINUED | OUTPATIENT
Start: 2023-03-03 | End: 2023-03-06 | Stop reason: HOSPADM

## 2023-03-03 RX ORDER — SODIUM CHLORIDE 9 MG/ML
INJECTION, SOLUTION INTRAVENOUS CONTINUOUS
Status: DISCONTINUED | OUTPATIENT
Start: 2023-03-03 | End: 2023-03-04

## 2023-03-03 RX ORDER — LORAZEPAM 2 MG/ML
0.5 INJECTION INTRAMUSCULAR EVERY 6 HOURS PRN
Status: DISCONTINUED | OUTPATIENT
Start: 2023-03-03 | End: 2023-03-06 | Stop reason: HOSPADM

## 2023-03-03 RX ORDER — SODIUM CHLORIDE 0.9 % (FLUSH) 0.9 %
5-40 SYRINGE (ML) INJECTION EVERY 12 HOURS SCHEDULED
Status: DISCONTINUED | OUTPATIENT
Start: 2023-03-03 | End: 2023-03-06 | Stop reason: HOSPADM

## 2023-03-03 RX ORDER — IPRATROPIUM BROMIDE AND ALBUTEROL SULFATE 2.5; .5 MG/3ML; MG/3ML
1 SOLUTION RESPIRATORY (INHALATION) 4 TIMES DAILY
Status: DISCONTINUED | OUTPATIENT
Start: 2023-03-03 | End: 2023-03-04

## 2023-03-03 RX ORDER — ONDANSETRON 2 MG/ML
4 INJECTION INTRAMUSCULAR; INTRAVENOUS
Status: DISCONTINUED | OUTPATIENT
Start: 2023-03-03 | End: 2023-03-03 | Stop reason: HOSPADM

## 2023-03-03 RX ORDER — CLINDAMYCIN PHOSPHATE 600 MG/50ML
600 INJECTION INTRAVENOUS EVERY 8 HOURS
Status: DISCONTINUED | OUTPATIENT
Start: 2023-03-03 | End: 2023-03-03

## 2023-03-03 RX ORDER — ROCURONIUM BROMIDE 10 MG/ML
INJECTION, SOLUTION INTRAVENOUS PRN
Status: DISCONTINUED | OUTPATIENT
Start: 2023-03-03 | End: 2023-03-03 | Stop reason: SDUPTHER

## 2023-03-03 RX ORDER — CIPROFLOXACIN 2 MG/ML
400 INJECTION, SOLUTION INTRAVENOUS ONCE
Status: COMPLETED | OUTPATIENT
Start: 2023-03-03 | End: 2023-03-03

## 2023-03-03 RX ORDER — POTASSIUM CHLORIDE 750 MG/1
10 TABLET, EXTENDED RELEASE ORAL 3 TIMES DAILY
Status: DISCONTINUED | OUTPATIENT
Start: 2023-03-03 | End: 2023-03-06 | Stop reason: HOSPADM

## 2023-03-03 RX ORDER — TRAMADOL HYDROCHLORIDE 50 MG/1
50 TABLET ORAL EVERY 6 HOURS PRN
Status: DISCONTINUED | OUTPATIENT
Start: 2023-03-03 | End: 2023-03-06 | Stop reason: HOSPADM

## 2023-03-03 RX ADMIN — FENTANYL CITRATE 25 MCG: 50 INJECTION INTRAMUSCULAR; INTRAVENOUS at 16:19

## 2023-03-03 RX ADMIN — FENTANYL CITRATE 50 MCG: 50 INJECTION INTRAMUSCULAR; INTRAVENOUS at 16:33

## 2023-03-03 RX ADMIN — IPRATROPIUM BROMIDE AND ALBUTEROL SULFATE 1 AMPULE: .5; 3 SOLUTION RESPIRATORY (INHALATION) at 15:15

## 2023-03-03 RX ADMIN — PROPOFOL 180 MG: 10 INJECTION, EMULSION INTRAVENOUS at 16:33

## 2023-03-03 RX ADMIN — CIPROFLOXACIN 400 MG: 2 INJECTION, SOLUTION INTRAVENOUS at 00:40

## 2023-03-03 RX ADMIN — MIDAZOLAM 0.5 MG: 1 INJECTION INTRAMUSCULAR; INTRAVENOUS at 17:26

## 2023-03-03 RX ADMIN — MIDAZOLAM 0.5 MG: 1 INJECTION INTRAMUSCULAR; INTRAVENOUS at 17:23

## 2023-03-03 RX ADMIN — SUGAMMADEX 25 MG: 100 INJECTION, SOLUTION INTRAVENOUS at 17:18

## 2023-03-03 RX ADMIN — ALBUTEROL SULFATE 2 PUFF: 90 AEROSOL, METERED RESPIRATORY (INHALATION) at 17:22

## 2023-03-03 RX ADMIN — MIDAZOLAM 0.5 MG: 1 INJECTION INTRAMUSCULAR; INTRAVENOUS at 17:20

## 2023-03-03 RX ADMIN — IOPAMIDOL 75 ML: 755 INJECTION, SOLUTION INTRAVENOUS at 10:19

## 2023-03-03 RX ADMIN — PHENYLEPHRINE HYDROCHLORIDE 200 MCG: 10 INJECTION INTRAVENOUS at 16:38

## 2023-03-03 RX ADMIN — SODIUM CHLORIDE: 9 INJECTION, SOLUTION INTRAVENOUS at 03:50

## 2023-03-03 RX ADMIN — ALBUTEROL SULFATE 2 PUFF: 90 AEROSOL, METERED RESPIRATORY (INHALATION) at 17:25

## 2023-03-03 RX ADMIN — METRONIDAZOLE 500 MG: 500 INJECTION, SOLUTION INTRAVENOUS at 01:46

## 2023-03-03 RX ADMIN — SODIUM CHLORIDE, POTASSIUM CHLORIDE, SODIUM LACTATE AND CALCIUM CHLORIDE: 600; 310; 30; 20 INJECTION, SOLUTION INTRAVENOUS at 17:01

## 2023-03-03 RX ADMIN — ENOXAPARIN SODIUM 40 MG: 100 INJECTION SUBCUTANEOUS at 10:59

## 2023-03-03 RX ADMIN — PHENYLEPHRINE HYDROCHLORIDE 100 MCG: 10 INJECTION INTRAVENOUS at 16:35

## 2023-03-03 RX ADMIN — SODIUM CHLORIDE: 9 INJECTION, SOLUTION INTRAVENOUS at 13:34

## 2023-03-03 RX ADMIN — LIDOCAINE HYDROCHLORIDE 40 MG: 20 INJECTION, SOLUTION EPIDURAL; INFILTRATION; INTRACAUDAL; PERINEURAL at 17:05

## 2023-03-03 RX ADMIN — SUCCINYLCHOLINE CHLORIDE 100 MG: 20 INJECTION, SOLUTION INTRAMUSCULAR; INTRAVENOUS at 16:33

## 2023-03-03 RX ADMIN — LIDOCAINE HYDROCHLORIDE 100 MG: 20 INJECTION, SOLUTION EPIDURAL; INFILTRATION; INTRACAUDAL; PERINEURAL at 16:33

## 2023-03-03 RX ADMIN — PHENYLEPHRINE HYDROCHLORIDE 200 MCG: 10 INJECTION INTRAVENOUS at 16:39

## 2023-03-03 RX ADMIN — ONDANSETRON 4 MG: 2 INJECTION INTRAMUSCULAR; INTRAVENOUS at 17:01

## 2023-03-03 RX ADMIN — LORAZEPAM 2 MG: 1 TABLET ORAL at 00:35

## 2023-03-03 RX ADMIN — IPRATROPIUM BROMIDE AND ALBUTEROL SULFATE 1 AMPULE: .5; 3 SOLUTION RESPIRATORY (INHALATION) at 09:49

## 2023-03-03 RX ADMIN — SODIUM CHLORIDE 1000 ML: 9 INJECTION, SOLUTION INTRAVENOUS at 00:12

## 2023-03-03 RX ADMIN — Medication 2000 MG: at 11:02

## 2023-03-03 RX ADMIN — PHENYLEPHRINE HYDROCHLORIDE 200 MCG: 10 INJECTION INTRAVENOUS at 16:45

## 2023-03-03 RX ADMIN — DEXAMETHASONE SODIUM PHOSPHATE 4 MG: 4 INJECTION, SOLUTION INTRAMUSCULAR; INTRAVENOUS at 16:48

## 2023-03-03 RX ADMIN — ROCURONIUM BROMIDE 5 MG: 10 INJECTION, SOLUTION INTRAVENOUS at 16:32

## 2023-03-03 RX ADMIN — MORPHINE SULFATE 2 MG: 2 INJECTION, SOLUTION INTRAMUSCULAR; INTRAVENOUS at 09:41

## 2023-03-03 RX ADMIN — FENTANYL CITRATE 25 MCG: 50 INJECTION INTRAMUSCULAR; INTRAVENOUS at 17:17

## 2023-03-03 RX ADMIN — ALBUTEROL SULFATE 2 PUFF: 90 AEROSOL, METERED RESPIRATORY (INHALATION) at 17:19

## 2023-03-03 RX ADMIN — SODIUM CHLORIDE, POTASSIUM CHLORIDE, SODIUM LACTATE AND CALCIUM CHLORIDE: 600; 310; 30; 20 INJECTION, SOLUTION INTRAVENOUS at 16:28

## 2023-03-03 RX ADMIN — ALBUTEROL SULFATE 2 PUFF: 90 AEROSOL, METERED RESPIRATORY (INHALATION) at 17:00

## 2023-03-03 RX ADMIN — MEROPENEM 1000 MG: 1 INJECTION, POWDER, FOR SOLUTION INTRAVENOUS at 19:49

## 2023-03-03 RX ADMIN — LORAZEPAM 0.5 MG: 2 INJECTION, SOLUTION INTRAMUSCULAR; INTRAVENOUS at 20:11

## 2023-03-03 RX ADMIN — ALBUTEROL SULFATE 2 PUFF: 90 AEROSOL, METERED RESPIRATORY (INHALATION) at 17:16

## 2023-03-03 RX ADMIN — ALBUTEROL SULFATE 2 PUFF: 90 AEROSOL, METERED RESPIRATORY (INHALATION) at 17:04

## 2023-03-03 RX ADMIN — IPRATROPIUM BROMIDE AND ALBUTEROL SULFATE 1 AMPULE: .5; 3 SOLUTION RESPIRATORY (INHALATION) at 20:12

## 2023-03-03 RX ADMIN — ALBUTEROL SULFATE 4 PUFF: 90 AEROSOL, METERED RESPIRATORY (INHALATION) at 16:50

## 2023-03-03 RX ADMIN — ALBUTEROL SULFATE 2 PUFF: 90 AEROSOL, METERED RESPIRATORY (INHALATION) at 16:55

## 2023-03-03 RX ADMIN — VANCOMYCIN HYDROCHLORIDE 750 MG: 750 INJECTION, POWDER, LYOPHILIZED, FOR SOLUTION INTRAVENOUS at 11:49

## 2023-03-03 RX ADMIN — PHENYLEPHRINE HYDROCHLORIDE 100 MCG: 10 INJECTION INTRAVENOUS at 16:55

## 2023-03-03 NOTE — PLAN OF CARE
Awakened for breakfast tray. Refusing food and liquids at this time. Assessment completed. Increased wheezing and coughing noted with agitation. Patient sounds like she has nasal congestion. Attempting to push staff away and needed a lot of encouragement for cooperation. Brief changed. Patient crying out with movement. RLE noted to be hot and red. Open area on right plantar surface in arch of foot noted. Pictures taken. Attempted to get patient to eat breakfast or take a drink. Still refusing. Crying out for mother and stating she wants to go home. Sitter continued at bedside.      Luis Bailon RN

## 2023-03-03 NOTE — PROGRESS NOTES
Discharge Criteria    Inpatients must meet Criteria 1 through 7. All other patients are either YES or N/A. If a NO is chosen then Anesthesia or Surgeon must be notified. 1.  Minimum 30 minutes after last dose of sedative medication, minimum 120 minutes after last dose of reversal agent. Yes      2. Systolic BP stable within 20 mmHg for 30 minutes & systolic BP between 90 & 485 or within 10 mmHg of baseline. Yes      3. Pulse between 60 and 100 or within 10 bpm of baseline. Yes      4. Spontaneous respiratory rate >/= 10 per minute. Yes      5. SaO2 >/= 95 or  >/= baseline. Yes      6. Able to cough and swallow or return to baseline function. Yes      7. Alert and oriented or return to baseline mental status. Yes      8. Demonstrates controlled, coordinated movements, ambulates with steady gait, or return to baseline activity function. N/A      9. Minimal or no pain or nausea, or at a level tolerable and acceptable to patient. N/A      10. Takes and retains oral fluids as allowed. N/A      11. Procedural / perioperative site stable. Minimal or no bleeding. N/A          12. If GI endoscopy procedure, minimal or no abdominal distention or passing flatus. N/A      13. Written discharge instructions and emergency telephone number provided. N/A      14. Accompanied by a responsible adult.     N/A

## 2023-03-03 NOTE — DISCHARGE INSTR - COC
Continuity of Care Form    Patient Name: Paul Cali   :  1950  MRN:  [id number]    Admit date:  3/2/2023  Discharge date:  3/6/2023    Code Status Order: Full Code   Advance Directives:   [facility] Documentation       Date/Time Healthcare Directive Type of Healthcare Directive Copy in [facility] [address] Agent's Name Healthcare Agent's Phone Number    23 1501 No, patient does not have an advance directive for healthcare treatment -- -- -- -- --            Admitting Physician:  Maya Woo MD  PCP: Maya Woo MD    Discharging Nurse: [address] [place] [address] Unit/Room#: 0426/2391-10  Discharging Unit Phone Number: [phone]    Emergency Contact:   Extended Emergency Contact Information  Primary Emergency Contact: Catrina Morgan  Address: [address] Phone: [phone]  Relation: Parent  Secondary Emergency Contact: [address] [facility] Phone: [phone]  Relation: Brother/Sister    Past Surgical History:  Past Surgical History:   Procedure Laterality Date    FIBULA FRACTURE SURGERY Left 1985    screws in placed - noted on CT 3/3/23    FOOT SURGERY Left 2012    exc. soft tissue mass    FOOT SURGERY Left 2015    FOOT SURGERY N/A 2013    removal of verruca    FOOT SURGERY Right 10/01/2012    surgical removal of warts    INCISION AND DRAINAGE FOOT  2023    Dr. Lawyer Gill Right 2011    OTHER SURGICAL HISTORY Left 10/16/2015    Nail bed excision     POLYPECTOMY  1995    vocal cord       Immunization History: There is no immunization history on file for this patient.     Active Problems:  Patient Active Problem List   Diagnosis Code    Sepsis ([address] A41.9    Hypertension I10    Intellectual disability F79    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[address] F20.9    Tardive dyskinesia G24.01    Cellulitis of right lower extremity L03.115 Isolation/Infection:   Isolation            No Isolation          Patient Infection Status       Infection Onset Added Last Indicated Last Indicated By Review Planned Expiration Resolved Resolved By    None active    Resolved    COVID-19 (Rule Out) 03/03/23 03/03/23 03/03/23 Respiratory Panel, Molecular, with COVID-19 (Restricted: peds pts or suitable admitted adults) (Ordered)   03/03/23 Rule-Out Test Resulted    COVID-19 (Rule Out) 03/03/23 03/03/23 03/03/23 Respiratory Panel, Molecular, with COVID-19 (Restricted: peds pts or suitable admitted adults) (Ordered)   03/03/23 Rule-Out Test Canceled    COVID-19 (Rule Out) 03/02/23 03/02/23 03/02/23 COVID-19, Rapid (Ordered)   03/02/23 Rule-Out Test Resulted            Nurse Assessment:  Last Vital Signs: BP (!) 140/84   Pulse 81   Temp 98.5 °F (36.9 °C) (Temporal)   Resp 19   Ht 5' (1.524 m)   Wt 139 lb 5.3 oz (63.2 kg)   SpO2 97%   BMI 27.21 kg/m²     Last documented pain score (0-10 scale): Pain Level: 0  Last Weight:   Wt Readings from Last 1 Encounters:   03/06/23 139 lb 5.3 oz (63.2 kg)     Mental Status:  disoriented and non-verbal    IV Access:  - None    Nursing Mobility/ADLs:  Walking   Dependent  Transfer  Dependent  Bathing  Dependent  Dressing  Dependent  Toileting  Dependent  Feeding  Assisted  Med Admin  Assisted  Med Delivery   whole and prefers mixed with pudding. Wound Care Documentation and Therapy:  Incision 06/21/13 Foot Left (Active)   Number of days: 0975       Incision 02/20/15 Foot Left (Active)   Number of days: 9902       Incision 10/16/15 Toe (Comment  which one) Left (Active)   Number of days: 2698       Incision 03/03/23 Foot Right;Plantar (Active)   Dressing Status New dressing applied;Clean;Dry; Intact 03/06/23 0845   Dressing/Treatment Non-adherent 03/06/23 0845   Closure Sutures 03/06/23 0845   Incision Assessment Dry 03/06/23 0845   Drainage Amount None 03/06/23 0845   Odor None 03/06/23 0845   Theresa-incision Assessment Blanchable erythema 03/06/23 0030   Number of days: 2        Elimination:  Continence: Bowel: No  Bladder: No  Urinary Catheter: None   Colostomy/Ileostomy/Ileal Conduit: No       Date of Last BM: 3/6/2023    Intake/Output Summary (Last 24 hours) at 3/6/2023 1121  Last data filed at 3/6/2023 0845  Gross per 24 hour   Intake 1900 ml   Output --   Net 1900 ml     I/O last 3 completed shifts: In: 1980 [P.O.:1980]  Out: -     Safety Concerns: At Risk for Falls and Aspiration Risk    Impairments/Disabilities:      Speech    Nutrition Therapy:  Current Nutrition Therapy:   - Oral Diet:  Dysphagia - Minced and Moist    Routes of Feeding: Oral  Liquids: No Restrictions  Daily Fluid Restriction: no  Last Modified Barium Swallow with Video (Video Swallowing Test): not done    Treatments at the Time of Hospital Discharge:   Respiratory Treatments: See MAR  Oxygen Therapy:  is not on home oxygen therapy. Ventilator:    - No ventilator support    Rehab Therapies: Physical Therapy and Occupational Therapy  Weight Bearing Status/Restrictions: No weight bearing restrictions  Other Medical Equipment (for information only, NOT a DME order):  wheelchair  Other Treatments: daily dressing change on right foot with Bactroban, non-adherent and Kerlix.     Patient's personal belongings (please select all that are sent with patient):  None    RN SIGNATURE:  Electronically signed by Lissett Ortega RN on 3/6/23 at 11:29 AM EST    CASE MANAGEMENT/SOCIAL WORK SECTION    Inpatient Status Date: 3/3/23    Readmission Risk Assessment Score:  Readmission Risk              Risk of Unplanned Readmission:  14           Discharging to Facility/ Agency   Name: Erik Oneil  Phone:[phone]  Fax:    Dialysis Facility (if applicable)   Name:  Address:  Dialysis Schedule:  Phone:  Fax:    / signature: Electronically signed by LIZY Howard on 3/3/23 at 1:39 PM EST    PHYSICIAN SECTION    Prognosis: Fair    Condition at Discharge: Stable    Rehab Potential (if transferring to Rehab): Fair    Recommended Labs or Other Treatments After Discharge: cbc w/ diff and cmp on 3/9    Physician Certification: I certify the above information and transfer of Sangeeta Maynard  is necessary for the continuing treatment of the diagnosis listed and that she requires Intermediate/Mental Retardation/Developmental Disabilities Care for greater 30 days.      Update Admission H&P: No change in H&P    PHYSICIAN SIGNATURE:  Electronically signed by MIGEULINA Munguia CNP on 3/6/23 at 10:52 AM EST

## 2023-03-03 NOTE — PROGRESS NOTES
Vital signs and assessment completed and charted. Navigator complete by writer and Reymundo RN using paperwork sent from [facility]. Patient continues to be disorientated x4, 1:1 sitter at bedside for patient's safety and impulsive behavior.

## 2023-03-03 NOTE — ED NOTES
Camilla Rodriguez contacted at [facility] informed that patient is being admitted to the hospital      [address] [place] [address], RN  03/03/23 0648

## 2023-03-03 NOTE — ANESTHESIA PRE PROCEDURE
Department of Anesthesiology  Preprocedure Note       Name:  Graham Silva   Age:  71 y.o.  :  1950                                          MRN:  [id number]         Date:  3/3/2023      Surgeon: Sue Dickens):  Gaviota Carrasquillo DPM    Procedure: Procedure(s):  FOOT DEBRIDEMENT INCISION AND DRAINAGE    Medications prior to admission:   Prior to Admission medications    Medication Sig Start Date End Date Taking? Authorizing Provider   meloxicam (MOBIC) 7.5 MG tablet Take 7.5 mg by mouth daily   Yes Historical Provider, MD   Cholecalciferol (VITAMIN D3) 50 MCG ( UT) CAPS Take 2,000 Units by mouth daily   Yes Historical Provider, MD   benzonatate (TESSALON) 200 MG capsule Take 200 mg by mouth 3 times daily as needed for Cough   Yes Historical Provider, MD   loratadine (CLARITIN) 10 MG tablet Take 10 mg by mouth daily as needed   Yes Historical Provider, MD   traMADol (ULTRAM) 50 MG tablet Take 50 mg by mouth every 6 hours as needed for Pain. Yes Historical Provider, MD   Wheat Dextrin (BENEFIBER) POWD Take 4 g by mouth 2 times daily (with meals) Patient only takes 1 tablespoon   Yes Historical Provider, MD   aluminum & magnesium hydroxide-simethicone (MAALOX) 200-200-20 MG/5ML SUSP suspension Take 5 mLs by mouth every 4 hours as needed for Indigestion   Yes Historical Provider, MD   petrolatum, lanolin, 8-hydroxyquinoline sulfate (BAG BALM) OINT Apply 1 Dose topically in the morning and at bedtime Apply topically to bilateral feet and both halluxes   Yes Historical Provider, MD   triazolam (HALCION) 0.25 MG tablet Take 0.25 mg by mouth once as needed. Take 1 hour before dental procedure 23   Historical Provider, MD   omeprazole (PRILOSEC) 20 MG delayed release capsule Take 40 mg by mouth daily    Historical Provider, MD   aspirin 81 MG tablet Take 81 mg by mouth daily. Historical Provider, MD   atorvastatin (LIPITOR) 20 MG tablet Take 20 mg by mouth daily.     Historical Provider, MD   docusate sodium (COLACE) 100 MG capsule Take 100 mg by mouth 2 times daily. Historical Provider, MD   ferrous sulfate 325 (65 FE) MG tablet Take 325 mg by mouth daily (with breakfast). Historical Provider, MD   hydrochlorothiazide (MICROZIDE) 12.5 MG capsule Take 12.5 mg by mouth daily. Historical Provider, MD   lisinopril (PRINIVIL;ZESTRIL) 5 MG tablet Take 10 mg by mouth daily    Historical Provider, MD   therapeutic multivitamin-minerals (THERAGRAN-M) tablet Take 1 tablet by mouth daily.     Historical Provider, MD   OLANZapine (ZYPREXA) 15 MG tablet Take 15 mg by mouth Daily with supper Takes at 1600    Historical Provider, MD   potassium chloride SA (K-DUR;KLOR-CON M) 10 MEQ tablet Take 10 mEq by mouth 3 times daily     Historical Provider, MD   acetaminophen (TYLENOL) 325 MG tablet Take 650 mg by mouth every 4 hours as needed for Pain or Fever    Historical Provider, MD   diphenhydrAMINE (BENADRYL) 25 MG capsule Take 25 mg by mouth every 6 hours as needed (Cold symptoms)    Historical Provider, MD   magnesium hydroxide (MILK OF MAGNESIA) 400 MG/5ML suspension Take 30 mLs by mouth daily as needed for Constipation    Historical Provider, MD   promethazine (PHENERGAN) 25 MG suppository Place 25 mg rectally every 6 hours as needed for Nausea (or vomiting)    Historical Provider, MD   promethazine (PHENERGAN) 25 MG tablet Take 25 mg by mouth every 6 hours as needed for Nausea    Historical Provider, MD   Dextromethorphan-guaiFENesin  MG/5ML SYRP Take 10 mLs by mouth every 6 hours as needed for Cough    Historical Provider, MD       Current medications:    Current Facility-Administered Medications   Medication Dose Route Frequency Provider Last Rate Last Admin    [MAR Hold] sodium chloride flush 0.9 % injection 5-40 mL  5-40 mL IntraVENous 2 times per day Sabrina Luna MD        [facility] Hold] sodium chloride flush 0.9 % injection 5-40 mL  5-40 mL IntraVENous PRN Sabrina Luna MD        [facility] Hold] 0.9 % sodium chloride infusion   IntraVENous PRN Kurtis Schaumann, MD        [facility] Hold] enoxaparin (LOVENOX) injection 40 mg  40 mg SubCUTAneous Daily Kurtis Schaumann, MD   40 mg at 03/03/23 1059    [MAR Hold] acetaminophen (TYLENOL) tablet 650 mg  650 mg Oral Q6H PRN Kurtis Schaumann, MD        Or   [facility] Hold] acetaminophen (TYLENOL) suppository 650 mg  650 mg Rectal Q6H PRN Kurtis Schaumann, MD        [facility] Hold] 0.9 % sodium chloride infusion   IntraVENous Continuous Kurtis Schaumann,  mL/hr at 03/03/23 1334 New Bag at 03/03/23 1334    [MAR Hold] LORazepam (ATIVAN) injection 0.5 mg  0.5 mg IntraVENous Q6H PRN Kurtis Schaumann, MD        [MAR Hold] aspirin EC tablet 81 mg  81 mg Oral Daily MIGUELINA Amin CNP        [MAR Hold] atorvastatin (LIPITOR) tablet 20 mg  20 mg Oral Daily MIGUELINA Amin CNP        [MAR Hold] benzonatate (TESSALON) capsule 200 mg  200 mg Oral TID PRN MIGUELINA Amin CNP        [MAR Hold] Vitamin D (CHOLECALCIFEROL) tablet 2,000 Units  2,000 Units Oral Daily MIGUELINA Amin - CNP        [MAR Hold] guaiFENesin-dextromethorphan (ROBITUSSIN DM) 100-10 MG/5ML syrup 10 mL  10 mL Oral Q6H PRN MIGUELINA Amin CNP        [MAR Hold] diphenhydrAMINE (BENADRYL) capsule 25 mg  25 mg Oral Q6H PRN MIGUELINA Amin CNP        [MAR Hold] docusate sodium (COLACE) capsule 100 mg  100 mg Oral BID MIGUELINA Amin CNP        [MAR Hold] ferrous sulfate (IRON 325) tablet 325 mg  325 mg Oral Daily with breakfast MIGUELINA Amin - CNP        [MAR Hold] hydroCHLOROthiazide (MICROZIDE) capsule 12.5 mg  12.5 mg Oral Daily MIGUELINA Landeros CNP        [MAR Hold] lisinopril (PRINIVIL;ZESTRIL) tablet 10 mg  10 mg Oral Daily MIGUELINA Garcia - CNP        [MAR Hold] cetirizine (ZYRTEC) tablet 10 mg  10 mg Oral Daily Bárbara De La Garza, APRN - CNP  [MAR Hold] magnesium hydroxide (MILK OF MAGNESIA) 400 MG/5ML suspension 30 mL  30 mL Oral Daily PRN Catrina Fuel, APRN - CNP        [Held by provider] meloxicam (MOBIC) tablet 7.5 mg  7.5 mg Oral Daily Catrina Fuel, APRN - CNP        [MAR Hold] OLANZapine (ZYPREXA) tablet 15 mg  15 mg Oral Dinner Catrina Fuel, APRN - CNP        [MAR Hold] pantoprazole (PROTONIX) tablet 40 mg  40 mg Oral QAM AC Ramya Mcgregor, APRN - CNP        [MAR Hold] potassium chloride (KLOR-CON M) extended release tablet 10 mEq  10 mEq Oral TID Catrina Fuel, APRN - CNP        [MAR Hold] promethazine (PHENERGAN) suppository 25 mg  25 mg Rectal Q6H PRN Catrina Fuel, APRN - CNP        [MAR Hold] promethazine (PHENERGAN) tablet 25 mg  25 mg Oral Q6H PRN Catrina Fuel, APRN - CNP        [MAR Hold] therapeutic multivitamin-minerals 1 tablet  1 tablet Oral Daily Ramya Mcgregor, APRASHELY - CNP        [MAR Hold] psyllium (METAMUCIL) 58.12 % packet 1 packet  1 packet Oral BID WC Ramya Mcgregor APRN - CNP        [MAR Hold] traMADol (ULTRAM) tablet 50 mg  50 mg Oral Q6H PRN Catrina Fuel, APRN - CNP        [MAR Hold] ipratropium-albuterol (DUONEB) nebulizer solution 1 ampule  1 ampule Inhalation 4x daily Catrina Fuel, APRN - CNP   1 ampule at 03/03/23 1515    [MAR Hold] ceFAZolin (ANCEF) 2000 mg in 0.9% sodium chloride 100 mL IVPB  2,000 mg IntraVENous Q8H Kaylene Vazquez APRN - CNP   Stopped at 03/03/23 1139    [MAR Hold] ketorolac (TORADOL) injection 15 mg  15 mg IntraVENous Q6H PRN Catrnia Fuel, APRN - CNP           Allergies:     Allergies   Allergen Reactions    Risperdal [Risperidone]     Thorazine [Chlorpromazine]     Penicillins Rash       Problem List:    Patient Active Problem List   Diagnosis Code    Sepsis ([facility] A41.9    Hypertension I10    Intellectual disability F68    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[facility] F20.9    Tardive dyskinesia G24.01    Cellulitis of right lower extremity L03.115       Past Medical History:        Diagnosis Date    Arthritis     Cellulitis of right lower extremity 3/3/2023    Falls frequently     1/2011    Femur fracture (HCC)     Fibrocystic breast     Hyperlipidemia     Hypertension     Kyphosis deformity of spine     Mental retardation     moderate    Osteoporosis     Pelvis fracture ([facility]     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HCC)     disorganized type    Scoliosis deformity of spine     Tardive dyskinesia        Past Surgical History:        Procedure Laterality Date    FIBULA FRACTURE SURGERY Left 08/01/1985    screws in placed - noted on CT 3/3/23    FOOT SURGERY Left 07/01/2012    exc. soft tissue mass    FOOT SURGERY Left 02/20/2015    FOOT SURGERY N/A 06/21/2013    removal of verruca    FOOT SURGERY Right 10/01/2012    surgical removal of warts    ORIF FEMUR DECOMPRESSION Right 01/11/2011    OTHER SURGICAL HISTORY Left 10/16/2015    Nail bed excision     POLYPECTOMY  01/01/1995    vocal cord       Social History:    Social History     Tobacco Use    Smoking status: Never    Smokeless tobacco: Not on file   Substance Use Topics    Alcohol use:  No                                Counseling given: Not Answered      Vital Signs (Current):   Vitals:    03/03/23 0949 03/03/23 1200 03/03/23 1509 03/03/23 1606   BP:    139/68   Pulse: (!) 108  (!) 102 (!) 104   Resp: 24  28 22   Temp:   36.9 °C (98.4 °F)    TempSrc:   Axillary    SpO2:  98% 95%    Weight:       Height:                                                  BP Readings from Last 3 Encounters:   03/03/23 139/68   06/21/13 117/81       NPO Status: Time of last liquid consumption: 1230                        Time of last solid consumption: 1230                        Date of last liquid consumption: 03/03/23                        Date of last solid food consumption: 03/03/23    BMI:   Wt Readings from Last 3 Encounters:   03/03/23 139 lb 12.4 oz (63.4 kg)   07/29/19 145 lb (65.8 kg)   10/15/15 130 lb (59 kg)     Body mass index is 27.3 kg/m². CBC:   Lab Results   Component Value Date/Time    WBC 18.7 03/02/2023 09:00 PM    RBC 5.37 03/02/2023 09:00 PM    RBC 4.98 01/10/2012 06:10 AM    HGB 15.3 03/02/2023 09:00 PM    HCT 46.2 03/02/2023 09:00 PM    MCV 86.0 03/02/2023 09:00 PM    RDW 14.6 03/02/2023 09:00 PM     03/02/2023 09:00 PM     01/10/2012 06:10 AM       CMP:   Lab Results   Component Value Date/Time     03/03/2023 05:40 AM    K 3.8 03/03/2023 05:40 AM     03/03/2023 05:40 AM    CO2 21 03/03/2023 05:40 AM    BUN 21 03/03/2023 05:40 AM    CREATININE 0.69 03/03/2023 05:40 AM    GFRAA >60 07/14/2022 06:55 AM    LABGLOM >60 03/03/2023 05:40 AM    GLUCOSE 135 03/03/2023 05:40 AM    GLUCOSE 86 01/10/2012 06:10 AM    PROT 6.2 03/03/2023 05:40 AM    CALCIUM 8.6 03/03/2023 05:40 AM    BILITOT 0.4 03/03/2023 05:40 AM    ALKPHOS 97 03/03/2023 05:40 AM    AST 15 03/03/2023 05:40 AM    ALT 14 03/03/2023 05:40 AM       POC Tests: No results for input(s): POCGLU, POCNA, POCK, POCCL, POCBUN, POCHEMO, POCHCT in the last 72 hours.     Coags:   Lab Results   Component Value Date/Time    PROTIME 13.9 03/02/2023 09:00 PM    INR 1.1 03/02/2023 09:00 PM       HCG (If Applicable): No results found for: PREGTESTUR, PREGSERUM, HCG, HCGQUANT     ABGs: No results found for: PHART, PO2ART, VMY3NCM, HGQ4HVY, BEART, S9IYEWBD     Type & Screen (If Applicable):  No results found for: LABABO, LABRH    Drug/Infectious Status (If Applicable):  Lab Results   Component Value Date/Time    HEPCAB NONREACTIVE 08/03/2021 06:20 AM       COVID-19 Screening (If Applicable):   Lab Results   Component Value Date/Time    COVID19 Not Detected 03/02/2023 08:46 PM           Anesthesia Evaluation  Patient summary reviewed and Nursing notes reviewed no history of anesthetic complications:   Airway: Mallampati: III     Neck ROM: limited     Dental:    (+) poor dentition      Pulmonary:Negative Pulmonary ROS and normal exam                               Cardiovascular:  Exercise tolerance: poor (<4 METS),   (+) hypertension:, hyperlipidemia                  Neuro/Psych:   (+) psychiatric history: stable with treatment            GI/Hepatic/Renal:   (+) GERD: well controlled,           Endo/Other:                     Abdominal:             Vascular: negative vascular ROS. Other Findings:           Anesthesia Plan      general     ASA 3 - emergent       Induction: intravenous. MIPS: Postoperative opioids intended and Prophylactic antiemetics administered. Anesthetic plan and risks discussed with patient and sibling. Use of blood products discussed with patient whom consented to blood products. Plan discussed with CRNA.                     Gaye Dueñas, APRN - CRNA   3/3/2023

## 2023-03-03 NOTE — BRIEF OP NOTE
Brief Postoperative Note      Patient: Cholo Navarrete  YOB: 1950  MRN: [id number]    Date of Procedure: 3/3/2023    Pre-Op Diagnosis: INFECTED FOOT    Post-Op Diagnosis: Same       Procedure(s):  FOOT DEBRIDEMENT INCISION AND DRAINAGE    Surgeon(s):  Silvana Aguiar DPM    Assistant:  * No surgical staff found *    Anesthesia: Choice    Estimated Blood Loss (mL): Minimal    Complications: None    Specimens:   ID Type Source Tests Collected by Time Destination   1 :  Tissue Foot CULTURE, ANAEROBIC AND AEROBIC Silvana Aguiar DPM 3/3/2023 1650        Implants:  * No implants in log *      Drains: * No LDAs found *    Findings: see the narrative    Electronically signed by Silvana Aguiar DPM on 3/3/2023 at 5:23 PM

## 2023-03-03 NOTE — OP NOTE
Operative Note      Patient: Angella Piper  YOB: 1950  MRN: [id number]    Date of Procedure: 3/3/2023    Pre-Op Diagnosis: INFECTED FOOT    Post-Op Diagnosis: Same       Procedure(s):  FOOT DEBRIDEMENT INCISION AND DRAINAGE    Surgeon(s):  Selvin High DPM    Assistant:   * No surgical staff found *    Anesthesia: Choice    Estimated Blood Loss (mL): Minimal    Complications: None    Specimens:   ID Type Source Tests Collected by Time Destination   1 :  Tissue Foot CULTURE, ANAEROBIC AND AEROBIC Selvin High DPM 3/3/2023 1650        Implants:  * No implants in log *      Drains: * No LDAs found *    Findings: see the narrative     Detailed Description of Procedure: The patient has signs and symptoms, both clinically and radiographically, that are consistent with the preprocedure diagnosis. It was determined the patient would benefit from surgical intervention. All potential risks, benefits, and complications of surgery were discussed with the patient prior the procedure. The patient wished to proceed with surgery. Informed written consent was obtained. The patient was brought from the preoperative area placed in operating table in supine position. Following induction of anesthesia, the operative lower extremity was scrubbed, prepped, and draped in usual sterile fashion. The following procedure was then performed:  Incision and drainage, right foot: Attention was directed to the medial aspect of patient's right foot where an area of fluctuance with a hemorrhagic callus was noted. A small incision was made in this area. There was a scant amount of phlegmon noted. This was sent for aerobic and anaerobic culture and sensitivity. No ann purulence was encountered. The wound was probed extensively with a hemostat to ensure that no loculations were present. The wound was irrigated with 3000 mL of lactated Ringer solution with 160 mg of gentamicin utilizing a pulse .   The small incision was reapproximated with 3-0 nylon suture. The wound was dressed with gauze Kerlix and Ace wrap. The patient will be readmitted to the floor for further IV antibiotic therapy, which has been changed from Ancef to vancomycin and meropenem. She will be closely followed by myself and the internal medicine staff.   Sponge and needle count: Correct at the end of the procedure  Specimen: Tissue for aerobic and anaerobic culture and sensitivity    Electronically signed by Selvin High DPM on 3/3/2023 at 5:24 PM

## 2023-03-03 NOTE — ED PROVIDER NOTES
[facility]      Pt Name: Garrett Mcardle  MRN: [id number]  [place] 1950  Date of evaluation: 3/2/2023  Provider: Jamaal Medellin MD    CHIEF COMPLAINT       Chief Complaint   Patient presents with    Fever     Comes from [facility] with complaints of fever. Caretaker at bedside. Facility has had covid going around         HISTORY OF PRESENT ILLNESS   (Location/Symptom, Timing/Onset, Context/Setting, Quality, Duration, Modifying Factors, Severity)  Note limiting factors. Garrett Mcardle is a 71 y.o. female who presents to the emergency department      71-year-old female presenting to the emergency department from her group home with caretaker for evaluation of fever that started today. Fever reportedly came on abruptly. She had been given Tylenol prior to arrival.  Patient is minimally verbal.  No cough. No nausea or vomiting. Patient is ambulatory. COVID-19 is waiting in the facility. Arrival patient is alert. Agitated. Febrile with a temperature of 40.1      Nursing Notes were reviewed. REVIEW OF SYSTEMS    (2-9 systems for level 4, 10 or more for level 5)     Review of Systems   Reason unable to perform ROS: MRDD. Except as noted above the remainder of the review of systems was reviewed and negative.        PAST MEDICAL HISTORY     Past Medical History:   Diagnosis Date    Arthritis     Cellulitis of right lower extremity 3/3/2023    Falls frequently     1/2011    Femur fracture (HCC)     Fibrocystic breast     Hyperlipidemia     Hypertension     Kyphosis deformity of spine     Mental retardation     moderate    Osteoporosis     Pelvis fracture (HCC)     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HCC)     disorganized type    Scoliosis deformity of spine     Tardive dyskinesia          SURGICAL HISTORY       Past Surgical History:   Procedure Laterality Date    FIBULA FRACTURE SURGERY Left 08/01/1985    screws in placed - noted on CT 3/3/23    FOOT DEBRIDEMENT Right 3/3/2023    FOOT DEBRIDEMENT INCISION AND DRAINAGE performed by Blake Cantu DPM at [address] Left 07/01/2012    exc. soft tissue mass    FOOT SURGERY Left 02/20/2015    FOOT SURGERY N/A 06/21/2013    removal of verruca    FOOT SURGERY Right 10/01/2012    surgical removal of warts    INCISION AND DRAINAGE FOOT  03/03/2023    Dr. Kimberli Mcgrath Right 01/11/2011    OTHER SURGICAL HISTORY Left 10/16/2015    Nail bed excision     POLYPECTOMY  01/01/1995    vocal cord         CURRENT MEDICATIONS       Discharge Medication List as of 3/6/2023 11:46 AM        CONTINUE these medications which have NOT CHANGED    Details   meloxicam (MOBIC) 7.5 MG tablet Take 7.5 mg by mouth dailyHistorical Med      Cholecalciferol (VITAMIN D3) 50 MCG (2000 UT) CAPS Take 2,000 Units by mouth dailyHistorical Med      benzonatate (TESSALON) 200 MG capsule Take 200 mg by mouth 3 times daily as needed for CoughHistorical Med      loratadine (CLARITIN) 10 MG tablet Take 10 mg by mouth daily as neededHistorical Med      traMADol (ULTRAM) 50 MG tablet Take 50 mg by mouth every 6 hours as needed for Pain. Historical Med      Wheat Dextrin (BENEFIBER) POWD Take 4 g by mouth 2 times daily (with meals) Patient only takes 1 tablespoonHistorical Med      aluminum & magnesium hydroxide-simethicone (MAALOX) 200-200-20 MG/5ML SUSP suspension Take 5 mLs by mouth every 4 hours as needed for IndigestionHistorical Med      petrolatum, lanolin, 8-hydroxyquinoline sulfate (BAG BALM) OINT Apply 1 Dose topically in the morning and at bedtime Apply topically to bilateral feet and both halluxesHistorical Med      triazolam (HALCION) 0.25 MG tablet Take 0.25 mg by mouth once as needed. Take 1 hour before dental procedureHistorical Med      omeprazole (PRILOSEC) 20 MG delayed release capsule Take 40 mg by mouth dailyHistorical Med      aspirin 81 MG tablet Take 81 mg by mouth daily. Historical Med      atorvastatin (LIPITOR) 20 MG tablet Take 20 mg by mouth daily. Historical Med      docusate sodium (COLACE) 100 MG capsule Take 100 mg by mouth 2 times daily. Historical Med      ferrous sulfate 325 (65 FE) MG tablet Take 325 mg by mouth daily (with breakfast). Historical Med      hydrochlorothiazide (MICROZIDE) 12.5 MG capsule Take 12.5 mg by mouth daily. Historical Med      lisinopril (PRINIVIL;ZESTRIL) 5 MG tablet Take 10 mg by mouth dailyHistorical Med      therapeutic multivitamin-minerals (THERAGRAN-M) tablet Take 1 tablet by mouth daily. Historical Med      OLANZapine (ZYPREXA) 15 MG tablet Take 15 mg by mouth Daily with supper Takes at 1600Historical Med      potassium chloride SA (K-DUR;KLOR-CON M) 10 MEQ tablet Take 10 mEq by mouth 3 times daily Historical Med      acetaminophen (TYLENOL) 325 MG tablet Take 650 mg by mouth every 4 hours as needed for Pain or FeverHistorical Med      diphenhydrAMINE (BENADRYL) 25 MG capsule Take 25 mg by mouth every 6 hours as needed (Cold symptoms)Historical Med      magnesium hydroxide (MILK OF MAGNESIA) 400 MG/5ML suspension Take 30 mLs by mouth daily as needed for ConstipationHistorical Med      promethazine (PHENERGAN) 25 MG suppository Place 25 mg rectally every 6 hours as needed for Nausea (or vomiting)Historical Med      promethazine (PHENERGAN) 25 MG tablet Take 25 mg by mouth every 6 hours as needed for NauseaHistorical Med      Dextromethorphan-guaiFENesin  MG/5ML SYRP Take 10 mLs by mouth every 6 hours as needed for CoughHistorical Med             ALLERGIES     Risperdal [risperidone], Thorazine [chlorpromazine], and Penicillins    FAMILY HISTORY     History reviewed. No pertinent family history. SOCIAL HISTORY       Social History     Socioeconomic History    Marital status: Single     Spouse name: None    Number of children: None    Years of education: None    Highest education level: None   Tobacco Use    Smoking status: Never   Substance and Sexual Activity    Alcohol use:  No SCREENINGS        Du Coma Scale  Eye Opening: Spontaneous  Best Verbal Response: Incomprehensible speech  Best Motor Response: Obeys commands  Du Coma Scale Score: 12               PHYSICAL EXAM    (up to 7 for level 4, 8 or more for level 5)     ED Triage Vitals [03/02/23 2045]   BP Temp Temp Source Pulse Resp SpO2 Height Weight   -- (!) 104.1 °F (40.1 °C) Rectal -- -- -- -- --       Physical Exam  Vitals and nursing note reviewed. Constitutional:       Comments: Agitated. Alert. Febrile temperature of 40.1 C.  Beats per minute sinus tachycardia. SaO2 is 92% on room air   HENT:      Head: Normocephalic and atraumatic. Nose: No rhinorrhea. Mouth/Throat:      Mouth: Mucous membranes are moist.   Eyes:      Conjunctiva/sclera: Conjunctivae normal.   Pulmonary:      Effort: Pulmonary effort is normal.      Breath sounds: Normal breath sounds. Comments: No wheezing rales or rhonchi were noted. Abdominal:      General: There is no distension. Palpations: Abdomen is soft. Tenderness: There is no abdominal tenderness. Musculoskeletal:         General: No swelling or tenderness. Skin:     Comments: Flushed. No rash. No wounds. Neurological:      Mental Status: She is alert. Comments: Irritable due to fever. Per caregiver patient is at baseline mental functioning. DIAGNOSTIC RESULTS     EKG: All EKG's are interpreted by the Emergency Department Physician who either signs or Co-signs this chart in the absence of a cardiologist.        RADIOLOGY:   Non-plain film images such as CT, Ultrasound and MRI are read by the radiologist. Plain radiographic images are visualized and preliminarily interpreted by the emergency physician with the below findings:        Interpretation per the Radiologist below, if available at the time of this note:    XR CHEST PORTABLE   Final Result   1. No acute cardiopulmonary process identified.          XR ABDOMEN (KUB) (SINGLE AP VIEW)   Final Result   Normal bowel gas pattern without acute abnormality. CT TIBIA FIBULA RIGHT W CONTRAST   Final Result   Soft tissue edema in the superficial distal leg and hindfoot. No abscess. CT CHEST ABDOMEN PELVIS W CONTRAST Additional Contrast? None   Final Result   Mild dependent atelectatic changes at the posterior lung bases bilaterally. Otherwise no confluent pneumonia or pulmonary atelectasis. No pleural   effusion or pneumothorax. Minimal pulmonary vascular congestion. Probable   mild interstitial pulmonary edema in lungs. No acute process in the mediastinum. Normal thoracic aorta. No evidence of   pulmonary embolism in the visualized central pulmonary arteries, central   branches and paracentral branches of pulmonary arteries. There is small to moderate retrocardiac hiatal hernia. There are multiple hypodense nonenhancing lesions scattered in liver most   likely to be multiple hepatic cysts. No diagnostic finding in gallbladder, spleen, and adrenal glands. Moderate   to markedly atrophic pancreas without any acute process. No renal stone or   obstructive uropathy. Normal appendix visualized. Small amount of increased fluid and small amount of gas scattered in small   bowel with few fluid levels. Probable acute enteritis. Moderate amount of stool present throughout most of colon. Moderate to   marked sigmoid diverticulosis, without diverticulitis. All mesenteric arteries and major branches are patent and opacified. Marked lumbar dextroscoliosis with moderate to marked multilevel spondylotic   changes. Moderate compression of upper aspect of L2 vertebral body, probably   chronic. XR CHEST PORTABLE   Final Result      Increased interstitial markings likely suggestive of mild pulmonary edema.                ED BEDSIDE ULTRASOUND:   Performed by ED Physician - none    LABS:  Labs Reviewed   CULTURE, ANAEROBIC AND AEROBIC - Abnormal; Notable for the following components:       Result Value    Culture STREPTOCOCCI, BETA HEMOLYTIC GROUP B LIGHT GROWTH (*)     Culture   (*)     Value: STAPHYLOCOCCUS SPECIES, COAGULASE NEGATIVE LIGHT GROWTH    All other components within normal limits   CBC WITH AUTO DIFFERENTIAL - Abnormal; Notable for the following components:    WBC 18.7 (*)     RBC 5.37 (*)     Hemoglobin 15.3 (*)     RDW 14.6 (*)     Seg Neutrophils 91 (*)     Lymphocytes 5 (*)     Segs Absolute 17.01 (*)     Absolute Lymph # 0.94 (*)     All other components within normal limits   COMPREHENSIVE METABOLIC PANEL - Abnormal; Notable for the following components:    Glucose 158 (*)     BUN 29 (*)     Bun/Cre Ratio 33 (*)     Alkaline Phosphatase 112 (*)     All other components within normal limits   LACTATE, SEPSIS - Abnormal; Notable for the following components:    Lactic Acid, Sepsis 2.2 (*)     All other components within normal limits   MICROSCOPIC URINALYSIS - Abnormal; Notable for the following components:    Mucus, UA 1+ (*)     All other components within normal limits   COMPREHENSIVE METABOLIC PANEL W/ REFLEX TO MG FOR LOW K - Abnormal; Notable for the following components:    Glucose 135 (*)     Bun/Cre Ratio 30 (*)     Total Protein 6.2 (*)     Albumin 3.3 (*)     All other components within normal limits   C-REACTIVE PROTEIN - Abnormal; Notable for the following components:    CRP 87.5 (*)     All other components within normal limits   BRAIN NATRIURETIC PEPTIDE - Abnormal; Notable for the following components:    Pro- (*)     All other components within normal limits   CBC WITH AUTO DIFFERENTIAL - Abnormal; Notable for the following components:    WBC 11.4 (*)     RDW 15.0 (*)     Seg Neutrophils 87 (*)     Lymphocytes 7 (*)     Eosinophils % 0 (*)     Segs Absolute 9.85 (*)     Absolute Lymph # 0.82 (*)     All other components within normal limits   COMPREHENSIVE METABOLIC PANEL W/ REFLEX TO MG FOR LOW K - Abnormal; Notable for the following components:    Glucose 120 (*)     Bun/Cre Ratio 27 (*)     Calcium 8.5 (*)     Chloride 108 (*)     Anion Gap 8 (*)     Total Bilirubin 0.2 (*)     Total Protein 5.6 (*)     Albumin 3.0 (*)     All other components within normal limits   CBC WITH AUTO DIFFERENTIAL - Abnormal; Notable for the following components:    RDW 14.9 (*)     Seg Neutrophils 73 (*)     Lymphocytes 16 (*)     Eosinophils % 0 (*)     Immature Granulocytes 1 (*)     Absolute Lymph # 1.03 (*)     All other components within normal limits   COMPREHENSIVE METABOLIC PANEL W/ REFLEX TO MG FOR LOW K - Abnormal; Notable for the following components:    Glucose 107 (*)     Bun/Cre Ratio 23 (*)     Anion Gap 8 (*)     Total Bilirubin 0.2 (*)     Total Protein 5.5 (*)     Albumin 2.7 (*)     All other components within normal limits   CBC WITH AUTO DIFFERENTIAL - Abnormal; Notable for the following components:    RDW 14.6 (*)     Seg Neutrophils 77 (*)     Lymphocytes 15 (*)     Eosinophils % 0 (*)     Absolute Lymph # 0.77 (*)     All other components within normal limits   COVID-19, RAPID   RAPID INFLUENZA A/B ANTIGENS   CULTURE, BLOOD 1   CULTURE, BLOOD 2   RESPIRATORY PANEL, MOLECULAR, WITH COVID-19   MRSA DNA PROBE, NASAL   COVID-19, RAPID   PROTIME-INR   URINALYSIS WITH REFLEX TO CULTURE   TROPONIN   LACTIC ACID   LIPASE       All other labs were within normal range or not returned as of this dictation.     EMERGENCY DEPARTMENT COURSE and DIFFERENTIAL DIAGNOSIS/MDM:   Vitals:    Vitals:    03/05/23 1845 03/06/23 0030 03/06/23 0545 03/06/23 0845   BP:  127/74  (!) 140/84   Pulse: (!) 114 81  81   Resp: 24 18  19   Temp: 97.9 °F (36.6 °C) 97.8 °F (36.6 °C)  98.5 °F (36.9 °C)   TempSrc: Temporal Temporal  Temporal   SpO2: 96% 94%  97%   Weight:   139 lb 5.3 oz (63.2 kg)    Height:               MDM  Number of Diagnoses or Management Options  Dehydration  Gastroenteritis  Infected abrasion of foot, right, initial encounter  Sepsis, due to unspecified organism, unspecified whether acute organ dysfunction present [facility])  Diagnosis management comments: 72 yo non verbal patient febrile temp 40.1. WBCs 18. Unknown source- diverticulosis but no evident diverticulitis on CT chest abd/pelvis. IV fluids and abx ordered. Patient admitted for continued management. MIPS       REASSESSMENT          CRITICAL CARE TIME   Total Critical Care time was  minutes, excluding separately reportable procedures. There was a high probability of clinically significant/life threatening deterioration in the patient's condition which required my urgent intervention. CONSULTS:  IP CONSULT TO CASE MANAGEMENT  IP CONSULT TO SOCIAL WORK  IP CONSULT TO INFECTIOUS DISEASES  IP CONSULT TO PODIATRY  IP CONSULT TO PHARMACY  PHARMACY TO DOSE VANCOMYCIN  IP CONSULT TO PHARMACY    PROCEDURES:  Unless otherwise noted below, none     Procedures        FINAL IMPRESSION      1. Sepsis, due to unspecified organism, unspecified whether acute organ dysfunction present ([facility] [address]    2. Dehydration    3. Gastroenteritis    4. Infected abrasion of foot, right, initial encounter          DISPOSITION/PLAN   DISPOSITION Admitted 03/03/2023 02:35:55 AM      PATIENT REFERRED TO:  [place] [id number]  [facility] [id number]  [facility] [address], [address] [place] [address]  [phone]    Go on 3/16/2023  at 1:30 PM for hospital follow up with Podiatrist.   The appointment will be in the [place] OFFICE of [facility] at [address] [place] [address]   Phone [phone]    DISCHARGE MEDICATIONS:  Discharge Medication List as of 3/6/2023 11:46 AM        START taking these medications    Details   cephALEXin (KEFLEX) 500 MG capsule Take 1 capsule by mouth 4 times daily for 7 days, Disp-28 capsule, R-0Print      mupirocin (BACTROBAN) 2 % ointment Apply topically 2 times daily for 14 days Apply topically 3 times daily. , Topical, 2 TIMES DAILY Starting Mon 3/6/2023, Until Mon 3/20/2023, For 14 days, Disp-30 g, R-0, Normal      lactobacillus (CULTURELLE) capsule Take 1 capsule by mouth daily (with breakfast)DC to SNF      albuterol (PROVENTIL) (2.5 MG/3ML) 0.083% nebulizer solution Take 3 mLs by nebulization every 4 hours as needed for Wheezing, Disp-120 each, R-3DC to SNF      ipratropium-albuterol (DUONEB) 0.5-2.5 (3) MG/3ML SOLN nebulizer solution Inhale 3 mLs into the lungs in the morning, at noon, and at bedtime, Disp-360 mLDC to SNF           Controlled Substances Monitoring:     No flowsheet data found.     (Please note that portions of this note were completed with a voice recognition program.  Efforts were made to edit the dictations but occasionally words are mis-transcribed.)    Pat Halsted, MD (electronically signed)  Attending Emergency Physician             Pat Halsted, MD  03/07/23 3039

## 2023-03-03 NOTE — PLAN OF CARE
Asleep in bed. Respirations easy. Does awaken easily and states she wants to go home. Aware when IV alarms to straighten arm. Occasionally has a strong dry cough but it is nonproductive. Sitter remains at bedside. Will continue to monitor.  Gorge Briggs RN

## 2023-03-03 NOTE — PROGRESS NOTES
Comprehensive Nutrition Assessment    Type and Reason for Visit:  Initial    Nutrition Recommendations/Plan:   Add magic cup bid  Encourage oral intakes     Malnutrition Assessment:  Malnutrition Status: At risk for malnutrition (Comment) (03/03/23 2091)    Context:  Acute Illness     Findings of the 6 clinical characteristics of malnutrition:  Energy Intake:  Mild decrease in energy intake (Comment) (at least acutely)  Weight Loss:  No significant weight loss     Body Fat Loss:  No significant body fat loss     Muscle Mass Loss:  No significant muscle mass loss    Fluid Accumulation:  Mild Extremities   Strength:  Not Performed    Nutrition Assessment:    Inadequate nutrient intakes r/t altered cognitive status, AEB refusing meals and fluids. From [facility], on minced and moist diet but likes foods like hot dogs, pizza, bologna (dislikes vegetables and whole meats per chart review). Stable weight at ~141# noted, mildly down. Ankle wound noted and is usually on a mvi w/minerals. Will try a magic cup with patient. Nutrition Related Findings:    limited with bed status and mostly covered Wound Type: Pressure Injury (ankle)       Current Nutrition Intake & Therapies:    Average Meal Intake: Refusing to eat  Average Supplements Intake: None Ordered  ADULT DIET; Dysphagia - Minced and Moist  ADULT ORAL NUTRITION SUPPLEMENT; Lunch, Dinner; Frozen Oral Supplement    Anthropometric Measures:  Height: 5' (152.4 cm)  Ideal Body Weight (IBW): 100 lbs (45 kg)    Admission Body Weight: 141 lb (64 kg)  Current Body Weight: 139 lb 12.8 oz (63.4 kg), 139.8 % IBW. Weight Source: Bed Scale  Current BMI (kg/m2): 27.3  Usual Body Weight: 141 lb (64 kg)  % Weight Change (Calculated): -0.9  Weight Adjustment For: No Adjustment                 BMI Categories: Overweight (BMI 25.0-29. 9)    Estimated Daily Nutrient Needs:  Energy Requirements Based On: Kcal/kg  Weight Used for Energy Requirements: Current  Energy (kcal/day): 0747-2895 (20-25)  Weight Used for Protein Requirements: Ideal  Protein (g/day): 59-68 (1.3-1.5)  Method Used for Fluid Requirements: 1 ml/kcal  Fluid (ml/day): 1600    Nutrition Diagnosis:   Inadequate energy intake related to cognitive or neurological impairment as evidenced by poor intake prior to admission, other (comment) (refusing meals)    Lab Results   Component Value Date     03/03/2023    K 3.8 03/03/2023     03/03/2023    CO2 21 03/03/2023    BUN 21 03/03/2023    CREATININE 0.69 03/03/2023    GLUCOSE 135 (H) 03/03/2023    CALCIUM 8.6 03/03/2023    PROT 6.2 (L) 03/03/2023    LABALBU 3.3 (L) 03/03/2023    BILITOT 0.4 03/03/2023    ALKPHOS 97 03/03/2023    AST 15 03/03/2023    ALT 14 03/03/2023    LABGLOM >60 03/03/2023    GFRAA >60 07/14/2022     No results found for: LABA1C  No results found for: VITD25    Nutrition Interventions:   Food and/or Nutrient Delivery: Continue Current Diet, Start Oral Nutrition Supplement  Nutrition Education/Counseling: Education not appropriate  Coordination of Nutrition Care: Continue to monitor while inpatient  Plan of Care discussed with: nursing    Goals:     Goals: Meet at least 75% of estimated needs       Nutrition Monitoring and Evaluation:   Behavioral-Environmental Outcomes: Beliefs and Attitutes  Food/Nutrient Intake Outcomes: Food and Nutrient Intake, Supplement Intake  Physical Signs/Symptoms Outcomes: Biochemical Data, Fluid Status or Edema, Weight, Skin    Discharge Planning:    Continue Oral Nutrition Supplement     Germain Lorenzo RD, LD  Contact: [phone]

## 2023-03-03 NOTE — PROGRESS NOTES
Patient's brief changed at this time with x2 nurses. Patient did yelling and was minorly combative during change. Theresa-care completed. Patient stops yelling and being combative once left alone to rest again. 1:1 sitter continued at bedside.

## 2023-03-03 NOTE — ED NOTES
[facility] staff member called for update and informed this writer that patient had previous complaints of upper left tooth pain. Dr. Kandace Lee made aware.      Melody Thorne RN  03/02/23 [id number]

## 2023-03-03 NOTE — PROGRESS NOTES
Called received from [facility]. Update given on patient. Was informed sister/guardian Duncan Luna was worried about patient, and stated that she understood she received ativan in ER. Duncan Luna stated that the last time patient took Ativan, patient had fallen. Duncan Luna wanted to ensure that someone was sitting with patient and that patient would not be up ambulating by self. Writer assured [facility] staff member to reassure sister that patient is currently 1:1 with a sitter, bed alarm is on, and patient will not be getting up by self.

## 2023-03-03 NOTE — CARE COORDINATION
Case Management Assessment  Initial Evaluation    Date/Time of Evaluation: 3/3/2023 1:40 PM  Assessment Completed by: LIZY Siu    If patient is discharged prior to next notation, then this note serves as note for discharge by case management. Patient Name: George Moon                   YOB: 1950  Diagnosis: Dehydration [E86.0]  Gastroenteritis [K52.9]  Sepsis ([facility] [address] [A41.9]  Sepsis, due to unspecified organism, unspecified whether acute organ dysfunction present [facility]) [A41.9]                   Date / Time: 3/2/2023  8:39 PM    Patient Admission Status: Inpatient   Readmission Risk (Low < 19, Mod (19-27), High > 27): Readmission Risk Score: 13.3    Current PCP: Flash Cooley MD  PCP verified by CM? Yes    Chart Reviewed: Yes      History Provided by: Child/Family  Patient Orientation: Alert and Oriented    Patient Cognition: Severely Impaired    Hospitalization in the last 30 days (Readmission):  No    If yes, Readmission Assessment in  Navigator will be completed. Advance Directives:      Code Status: Full Code   Patient's Primary Decision Maker is: Legal Next of Kin (guardian)    Primary Decision Maker: Catrina Morgan - Parent, Legal Guardian - [phone]    Primary Decision Maker: Rhea Meme - Brother/Sister, Legal Guardian - [phone]    Discharge Planning:    Patient lives with: Other (Comment) ([facility]) Type of Home: Other (Comment) [facility])  Primary Care Giver:  Other (Comment) ([address])  Patient Support Systems include: Family Members, Parent, Other (Comment) [facility] staff)   Current Financial resources: Medicaid, Medicare  Current community resources: Transportation, Institutional Placement  Current services prior to admission: [address], Transportation, Extended Care Facility            Current DME: Milan Lindsay            Type of Home Care services:  [employer], Attendant, Nursing Services    ADLS  Prior functional level: Assistance with the following:, Mobility, Cooking, Housework, Shopping  Current functional level: Assistance with the following:, Mobility, Cooking, Housework, Shopping    PT AM-PAC:   /24  OT AM-PAC:   /24    Family can provide assistance at DC: No  Would you like Case Management to discuss the discharge plan with any other family members/significant others, and if so, who? Yes  Plans to Return to Present Housing: Yes  Other Identified Issues/Barriers to RETURNING to current housing: none  Potential Assistance needed at discharge: [address], Transportation            Potential DME: Milan Reed  Patient expects to discharge to: Long-term care  Plan for transportation at discharge: [address]    Payor: Precilla Setters / Plan: MEDICARE PART A AND B / Product Type: *No Product type* /     Does insurance require precert for SNF: No    Potential assistance Purchasing Medications: No  Meds-to-Beds request:  no    No Pharmacies Listed    Notes:    Factors facilitating achievement of predicted outcomes: Family support and Caregiver support    Barriers to discharge: Cognitive deficit, Impulsivity, Limited safety awareness, and Limited insight into deficits    Additional Case Management Notes: Patient is single and lives at [place]. She has a DX of Intellectual disability. Patient uses a walker and wheelchair. Per the staff she is independent with her ADL's. [facility] [place] manages the medications and provides the transportation. The patient will be discharge back to [facility] when medically stable. The Plan for Transition of Care is related to the following treatment goals of Dehydration [E86.0]  Gastroenteritis [K52.9]  Sepsis ([facility] [address] [A41.9]  Sepsis, due to unspecified organism, unspecified whether acute organ dysfunction present ([facility] [address] [A41.9]    Transportation/Food Security/Housekeeping Addressed: No issues identified or concerns.     The Patient and/or Patient Representative Agree with the Discharge Plan? Yes    The patient will be discharge back to [facility] when medically stable.     Jean Watson [place]  Case Management Department  Ph: [phone]

## 2023-03-03 NOTE — CONSULTS
Podiatric Surgery Consultation   History and Physical        CHIEF COMPLAINT:  RLE Cellulitis    Reason for Admission:  Same, also sepsis     History Obtained From:  patient, family member, sister/guardian    HISTORY OF PRESENT ILLNESS:      The patient is a 71 y.o. female with significant past medical history of schizophrenia and TD who was admitted last evening with a wound to the medial arch of her right foot and developing cellulitis. This was mild on admission. However, over the last 24 hours she has had worsening cellulitis which is severe at this point. She was noted to have an elevated lactic acid level of 2.2. White blood cell count is 18.7, despite antibiotic therapy. She has a history of an ORIF to the left ankle. She relates pain in the right lower extremity. CT of the right tib-fib does not demonstrate abscess or erosive changes consistent with osteomyelitis. There is intact surgical hardware at the distal fibula and distal tibia. Past Medical History:        Diagnosis Date    Arthritis     Cellulitis of right lower extremity 3/3/2023    Falls frequently     1/2011    Femur fracture (HCC)     Fibrocystic breast     Hyperlipidemia     Hypertension     Kyphosis deformity of spine     Mental retardation     moderate    Osteoporosis     Pelvis fracture (HCC)     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HCC)     disorganized type    Scoliosis deformity of spine     Tardive dyskinesia      Past Surgical History:        Procedure Laterality Date    FIBULA FRACTURE SURGERY Left 08/01/1985    screws in placed - noted on CT 3/3/23    FOOT SURGERY Left 07/01/2012    exc.  soft tissue mass    FOOT SURGERY Left 02/20/2015    FOOT SURGERY N/A 06/21/2013    removal of verruca    FOOT SURGERY Right 10/01/2012    surgical removal of warts    ORIF FEMUR DECOMPRESSION Right 01/11/2011    OTHER SURGICAL HISTORY Left 10/16/2015    Nail bed excision     POLYPECTOMY  01/01/1995    vocal cord         Medications Prior to Admission: Medications Prior to Admission: meloxicam (MOBIC) 7.5 MG tablet, Take 7.5 mg by mouth daily  Cholecalciferol (VITAMIN D3) 50 MCG (2000 UT) CAPS, Take 2,000 Units by mouth daily  benzonatate (TESSALON) 200 MG capsule, Take 200 mg by mouth 3 times daily as needed for Cough  loratadine (CLARITIN) 10 MG tablet, Take 10 mg by mouth daily as needed  traMADol (ULTRAM) 50 MG tablet, Take 50 mg by mouth every 6 hours as needed for Pain. Wheat Dextrin (BENEFIBER) POWD, Take 4 g by mouth 2 times daily (with meals) Patient only takes 1 tablespoon  aluminum & magnesium hydroxide-simethicone (MAALOX) 200-200-20 MG/5ML SUSP suspension, Take 5 mLs by mouth every 4 hours as needed for Indigestion  petrolatum, lanolin, 8-hydroxyquinoline sulfate (BAG BALM) OINT, Apply 1 Dose topically in the morning and at bedtime Apply topically to bilateral feet and both halluxes  triazolam (HALCION) 0.25 MG tablet, Take 0.25 mg by mouth once as needed. Take 1 hour before dental procedure  omeprazole (PRILOSEC) 20 MG delayed release capsule, Take 40 mg by mouth daily  [DISCONTINUED] benzonatate (TESSALON) 100 MG capsule, Take 200 mg by mouth 3 times daily as needed for Cough. [DISCONTINUED] imiquimod (ALDARA) 5 % cream, Apply  topically nightly. Apply topically three times a week. (Patient not taking: Reported on 3/3/2023)  [DISCONTINUED] fluorouracil (EFUDEX) 5 % cream, Apply  topically daily. Apply topically 2 times daily. (Patient not taking: Reported on 3/3/2023)  [DISCONTINUED] neomycin-bacitracin-polymyxin (NEOSPORIN) 400-5-5000 ointment, Apply  topically daily. Apply topically 2 times daily. (Patient not taking: Reported on 3/3/2023)  aspirin 81 MG tablet, Take 81 mg by mouth daily. atorvastatin (LIPITOR) 20 MG tablet, Take 20 mg by mouth daily. docusate sodium (COLACE) 100 MG capsule, Take 100 mg by mouth 2 times daily. ferrous sulfate 325 (65 FE) MG tablet, Take 325 mg by mouth daily (with breakfast).   hydrochlorothiazide (MICROZIDE) 12.5 MG capsule, Take 12.5 mg by mouth daily. lisinopril (PRINIVIL;ZESTRIL) 5 MG tablet, Take 10 mg by mouth daily  therapeutic multivitamin-minerals (THERAGRAN-M) tablet, Take 1 tablet by mouth daily. OLANZapine (ZYPREXA) 15 MG tablet, Take 15 mg by mouth Daily with supper Takes at 1600  potassium chloride SA (K-DUR;KLOR-CON M) 10 MEQ tablet, Take 10 mEq by mouth 3 times daily   acetaminophen (TYLENOL) 325 MG tablet, Take 650 mg by mouth every 4 hours as needed for Pain or Fever  diphenhydrAMINE (BENADRYL) 25 MG capsule, Take 25 mg by mouth every 6 hours as needed (Cold symptoms)  magnesium hydroxide (MILK OF MAGNESIA) 400 MG/5ML suspension, Take 30 mLs by mouth daily as needed for Constipation  promethazine (PHENERGAN) 25 MG suppository, Place 25 mg rectally every 6 hours as needed for Nausea (or vomiting)  promethazine (PHENERGAN) 25 MG tablet, Take 25 mg by mouth every 6 hours as needed for Nausea  Dextromethorphan-guaiFENesin  MG/5ML SYRP, Take 10 mLs by mouth every 6 hours as needed for Cough  [DISCONTINUED] calcium carbonate (OSCAL) 500 MG TABS tablet, Take 500 mg by mouth 2 times daily. (Patient not taking: Reported on 3/3/2023)  [DISCONTINUED] Vitamin D (CHOLECALCIFEROL) 1000 UNITS CAPS capsule, Take 1,000 Units by mouth daily. Allergies:  Risperdal [risperidone], Thorazine [chlorpromazine], and Penicillins      Family History:   History reviewed. No pertinent family history. REVIEW OF SYSTEMS:  Reviewed and otherwise unremarkable with exception of the history of present illness  PHYSICAL EXAM:  VITALS:  /68   Pulse (!) 104   Temp 98.4 °F (36.9 °C) (Axillary)   Resp 22   Ht 5' (1.524 m)   Wt 139 lb 12.4 oz (63.4 kg)   SpO2 95%   BMI 27.30 kg/m²   GEN:     Awake but lethargic . EYES:   EOMI, pupils equal   NECK: Supple. No lymphadenopathy. CVS:     regular.  no murmur  PULM:   scattered rhonchi , no acute respiratory distress  ABD:     Bowels sounds are normal. Abdomen is soft. No distention. no tenderness  NEURO: Moves all extremities. Motor and sensory are grossly intact  Lower extremity exam:  Pedal pulses are palpable, bilaterally. There is diffuse edema of the right foot and lower leg. There is intense erythema extending to the knee from a wound to the medial arch with surrounding fluctuance. ASSESSMENT AND PLAN:    1. Cellulitis, abscess, right foot-the patient will undergo prompt incision and drainage of the right foot. This was discussed with her sister/guardian at length, including all potential risk, benefits, and complications. All of her questions were answered to her satisfaction. No guarantees were given as to surgical outcome. Informed written consent was obtained. She will continue on IV antibiotic therapy. She is currently receiving Ancef every 8 hours. We will follow closely. Thank you for the opportunity to participate in the care of your patient.

## 2023-03-03 NOTE — H&P
History and Physical    Patient:  Winsome Gamboa  MRN: [id number]    Chief Complaint: Fever    History Obtained From:  electronic medical record, reason patient could not give history: MRDD    PCP: Ken Tse MD    History of Present Illness: The patient is a 71 y.o. female who presented to the emergency room from [facility] due to fever. Patient's fever started abruptly and was given Tylenol prior to arrival to the ER. Patient does have moderate MRDD and is minimally verbal.  No nausea or vomiting was reported. She is normally ambulatory. They do report that COVID-19 viruses are current throughout the facility. Upon arrival patient was alert but agitated with a temperature 104.1. Upon my examination today patient does appear dyspneic and ill. She is agitated with stimulation. Past Medical History:        Diagnosis Date    Arthritis     Falls frequently     1/2011    Femur fracture (HCC)     Fibrocystic breast     Hyperlipidemia     Hypertension     Kyphosis deformity of spine     Mental retardation     moderate    Osteoporosis     Pelvis fracture (HCC)     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[address]     disorganized type    Scoliosis deformity of spine     Tardive dyskinesia        Past Surgical History:        Procedure Laterality Date    FIBULA FRACTURE SURGERY Left 8/1985    FOOT SURGERY Left 7/2012    exc. soft tissue mass    FOOT SURGERY Left 02/20/2015    FOOT SURGERY N/A 6/21/2013    removal of verruca    FOOT SURGERY Right 10/2012    surgical removal of warts    ORIF FEMUR DECOMPRESSION Right 1/11/2011    OTHER SURGICAL HISTORY Left 10/16/15    Nail bed excision     POLYPECTOMY  1/1995    vocal cord       Medications Prior to Admission:    Prior to Admission medications    Medication Sig Start Date End Date Taking?  Authorizing Provider   meloxicam (MOBIC) 7.5 MG tablet Take 7.5 mg by mouth daily   Yes Historical Provider, MD   Cholecalciferol (VITAMIN D3) 50 MCG (2000 UT) CAPS Take 2,000 Units by mouth daily   Yes Historical Provider, MD   benzonatate (TESSALON) 200 MG capsule Take 200 mg by mouth 3 times daily as needed for Cough   Yes Historical Provider, MD   loratadine (CLARITIN) 10 MG tablet Take 10 mg by mouth daily as needed   Yes Historical Provider, MD   traMADol (ULTRAM) 50 MG tablet Take 50 mg by mouth every 6 hours as needed for Pain. Yes Historical Provider, MD   Wheat Dextrin (BENEFIBER) POWD Take 4 g by mouth 2 times daily (with meals) Patient only takes 1 tablespoon   Yes Historical Provider, MD   aluminum & magnesium hydroxide-simethicone (MAALOX) 200-200-20 MG/5ML SUSP suspension Take 5 mLs by mouth every 4 hours as needed for Indigestion   Yes Historical Provider, MD   petrolatum, lanolin, 8-hydroxyquinoline sulfate (BAG BALM) OINT Apply 1 Dose topically in the morning and at bedtime Apply topically to bilateral feet and both halluxes   Yes Historical Provider, MD   triazolam (HALCION) 0.25 MG tablet Take 0.25 mg by mouth once as needed. Take 1 hour before dental procedure 1/12/23   Historical Provider, MD   omeprazole (PRILOSEC) 20 MG delayed release capsule Take 40 mg by mouth daily    Historical Provider, MD   aspirin 81 MG tablet Take 81 mg by mouth daily. Historical Provider, MD   atorvastatin (LIPITOR) 20 MG tablet Take 20 mg by mouth daily. Historical Provider, MD   docusate sodium (COLACE) 100 MG capsule Take 100 mg by mouth 2 times daily. Historical Provider, MD   ferrous sulfate 325 (65 FE) MG tablet Take 325 mg by mouth daily (with breakfast). Historical Provider, MD   hydrochlorothiazide (MICROZIDE) 12.5 MG capsule Take 12.5 mg by mouth daily. Historical Provider, MD   lisinopril (PRINIVIL;ZESTRIL) 5 MG tablet Take 10 mg by mouth daily    Historical Provider, MD   therapeutic multivitamin-minerals (THERAGRAN-M) tablet Take 1 tablet by mouth daily.     Historical Provider, MD   OLANZapine (ZYPREXA) 15 MG tablet Take 15 mg by mouth Daily with supper Takes at 1600 Historical Provider, MD   potassium chloride SA (K-DUR;KLOR-CON M) 10 MEQ tablet Take 10 mEq by mouth 3 times daily     Historical Provider, MD   acetaminophen (TYLENOL) 325 MG tablet Take 650 mg by mouth every 4 hours as needed for Pain or Fever    Historical Provider, MD   diphenhydrAMINE (BENADRYL) 25 MG capsule Take 25 mg by mouth every 6 hours as needed (Cold symptoms)    Historical Provider, MD   magnesium hydroxide (MILK OF MAGNESIA) 400 MG/5ML suspension Take 30 mLs by mouth daily as needed for Constipation    Historical Provider, MD   promethazine (PHENERGAN) 25 MG suppository Place 25 mg rectally every 6 hours as needed for Nausea (or vomiting)    Historical Provider, MD   promethazine (PHENERGAN) 25 MG tablet Take 25 mg by mouth every 6 hours as needed for Nausea    Historical Provider, MD   Dextromethorphan-guaiFENesin  MG/5ML SYRP Take 10 mLs by mouth every 6 hours as needed for Cough    Historical Provider, MD       Allergies:  Risperdal [risperidone], Thorazine [chlorpromazine], and Penicillins    Social History:   TOBACCO:   reports that she has never smoked. She does not have any smokeless tobacco history on file. ETOH:   reports no history of alcohol use. Family History:   History reviewed. No pertinent family history. Allergies:  Risperdal [risperidone], Thorazine [chlorpromazine], and Penicillins    Medications Prior to Admission:    Prior to Admission medications    Medication Sig Start Date End Date Taking?  Authorizing Provider   meloxicam (MOBIC) 7.5 MG tablet Take 7.5 mg by mouth daily   Yes Historical Provider, MD   Cholecalciferol (VITAMIN D3) 50 MCG (2000 UT) CAPS Take 2,000 Units by mouth daily   Yes Historical Provider, MD   benzonatate (TESSALON) 200 MG capsule Take 200 mg by mouth 3 times daily as needed for Cough   Yes Historical Provider, MD   loratadine (CLARITIN) 10 MG tablet Take 10 mg by mouth daily as needed   Yes Historical Provider, MD   traMADol (ULTRAM) 50 MG tablet Take 50 mg by mouth every 6 hours as needed for Pain. Yes Historical Provider, MD   Wheat Dextrin (BENEFIBER) POWD Take 4 g by mouth 2 times daily (with meals) Patient only takes 1 tablespoon   Yes Historical Provider, MD   aluminum & magnesium hydroxide-simethicone (MAALOX) 200-200-20 MG/5ML SUSP suspension Take 5 mLs by mouth every 4 hours as needed for Indigestion   Yes Historical Provider, MD   petrolatum, lanolin, 8-hydroxyquinoline sulfate (BAG BALM) OINT Apply 1 Dose topically in the morning and at bedtime Apply topically to bilateral feet and both halluxes   Yes Historical Provider, MD   triazolam (HALCION) 0.25 MG tablet Take 0.25 mg by mouth once as needed. Take 1 hour before dental procedure 1/12/23   Historical Provider, MD   omeprazole (PRILOSEC) 20 MG delayed release capsule Take 40 mg by mouth daily    Historical Provider, MD   aspirin 81 MG tablet Take 81 mg by mouth daily. Historical Provider, MD   atorvastatin (LIPITOR) 20 MG tablet Take 20 mg by mouth daily. Historical Provider, MD   docusate sodium (COLACE) 100 MG capsule Take 100 mg by mouth 2 times daily. Historical Provider, MD   ferrous sulfate 325 (65 FE) MG tablet Take 325 mg by mouth daily (with breakfast). Historical Provider, MD   hydrochlorothiazide (MICROZIDE) 12.5 MG capsule Take 12.5 mg by mouth daily. Historical Provider, MD   lisinopril (PRINIVIL;ZESTRIL) 5 MG tablet Take 10 mg by mouth daily    Historical Provider, MD   therapeutic multivitamin-minerals (THERAGRAN-M) tablet Take 1 tablet by mouth daily.     Historical Provider, MD   OLANZapine (ZYPREXA) 15 MG tablet Take 15 mg by mouth Daily with supper Takes at 1600    Historical Provider, MD   potassium chloride SA (K-DUR;KLOR-CON M) 10 MEQ tablet Take 10 mEq by mouth 3 times daily     Historical Provider, MD   acetaminophen (TYLENOL) 325 MG tablet Take 650 mg by mouth every 4 hours as needed for Pain or Fever    Historical Provider, MD   diphenhydrAMINE (BENADRYL) 25 MG capsule Take 25 mg by mouth every 6 hours as needed (Cold symptoms)    Historical Provider, MD   magnesium hydroxide (MILK OF MAGNESIA) 400 MG/5ML suspension Take 30 mLs by mouth daily as needed for Constipation    Historical Provider, MD   promethazine (PHENERGAN) 25 MG suppository Place 25 mg rectally every 6 hours as needed for Nausea (or vomiting)    Historical Provider, MD   promethazine (PHENERGAN) 25 MG tablet Take 25 mg by mouth every 6 hours as needed for Nausea    Historical Provider, MD   Dextromethorphan-guaiFENesin  MG/5ML SYRP Take 10 mLs by mouth every 6 hours as needed for Cough    Historical Provider, MD       Review of Systems:unable due to profound MRDD    Physical Exam:    Vitals:   Temp: 98.6 °F (37 °C)  BP: 132/72  Resp: 24  Heart Rate: (!) 107  SpO2: 96 %  24HR INTAKE/OUTPUT:    Intake/Output Summary (Last 24 hours) at 3/3/2023 0936  Last data filed at 3/3/2023 0253  Gross per 24 hour   Intake --   Output 1 ml   Net -1 ml       Weight    Body mass index is 27.3 kg/m². Exam:  GEN:    Awake, alert and agitated with stimulation  EYES:  EOMI, pupils equal   NECK: Supple. No lymphadenopathy. No carotid bruit  CVS:    regular but tachycardic, no audible murmur  PULM:   scatted exp wheezing and rhonchi , mild acute respiratory distress  ABD:    Bowels sounds normal.  Abdomen is soft. No distention. no tenderness to palpation. EXT:    Non pitting   edema in the RLE . No calf tenderness. Erythema to RLE and foot. Weeping between toes. Plantar surface of right foot dark area with white induration. See pictures. NEURO: Moves all extremities. Motor and sensory are grossly intact  SKIN:  No rashes. No skin lesions.   See Ext Assessment and pics    3/3/2023 -- right leg and foot    \\      -----------------------------------------------------------------  Diagnostic Data:     DATA:    CBC:   Lab Results   Component Value Date    WBC 18.7 (H) 03/02/2023    RBC 5.37 (H) 03/02/2023    HGB 15.3 (H) 03/02/2023    HCT 46.2 03/02/2023    MCV 86.0 03/02/2023     03/02/2023        CMP:   Lab Results   Component Value Date    GLUCOSE 135 (H) 03/03/2023    BUN 21 03/03/2023    CREATININE 0.69 03/03/2023     03/03/2023    K 3.8 03/03/2023    CALCIUM 8.6 03/03/2023     03/03/2023    CO2 21 03/03/2023    PROT 6.2 (L) 03/03/2023    LABALBU 3.3 (L) 03/03/2023    BILITOT 0.4 03/03/2023    ALKPHOS 97 03/03/2023    ALT 14 03/03/2023    AST 15 03/03/2023       UA:   Lab Results   Component Value Date    COLORU Yellow 03/02/2023    SPECGRAV 1.020 03/02/2023    WBCUA 0 TO 2 03/02/2023    RBCUA 2 TO 5 03/02/2023    EPITHUA 0 TO 2 03/02/2023    LEUKOCYTESUR NEGATIVE 03/02/2023    GLUCOSEU NEGATIVE 03/02/2023    KETUA NEGATIVE 03/02/2023    PROTEINU NEGATIVE 03/02/2023    HGBUR NEGATIVE 03/02/2023    CASTUA NOT REPORTED 01/15/2019    CRYSTUA NOT REPORTED 01/15/2019    BACTERIA NOT REPORTED 01/15/2019    YEAST NOT REPORTED 01/15/2019       Lactic Acid:   Lab Results   Component Value Date    LACTA 1.2 03/03/2023       D-Dimer:  No results found for: DDIMER    PT/INR:  Lab Results   Component Value Date/Time    PROTIME 13.9 03/02/2023 09:00 PM    INR 1.1 03/02/2023 09:00 PM       High Sensitivity Troponin:  Recent Labs     03/02/23  2100   TROPHS 13       ABGs:   No results found for: PHART, PH, MLJ2RUD, PCO2, PO2ART, PO2, FWL9ALV, HCO3, BEART, BE, THGBART, THB, SWG7UBN, O7NQULMK, O2SAT, FIO2        CT CHEST ABDOMEN PELVIS W CONTRAST Additional Contrast? None   Final Result   Mild dependent atelectatic changes at the posterior lung bases bilaterally. Otherwise no confluent pneumonia or pulmonary atelectasis. No pleural   effusion or pneumothorax. Minimal pulmonary vascular congestion. Probable   mild interstitial pulmonary edema in lungs. No acute process in the mediastinum. Normal thoracic aorta.   No evidence of   pulmonary embolism in the visualized central pulmonary arteries, central   branches and paracentral branches of pulmonary arteries. There is small to moderate retrocardiac hiatal hernia. There are multiple hypodense nonenhancing lesions scattered in liver most   likely to be multiple hepatic cysts. No diagnostic finding in gallbladder, spleen, and adrenal glands. Moderate   to markedly atrophic pancreas without any acute process. No renal stone or   obstructive uropathy. Normal appendix visualized. Small amount of increased fluid and small amount of gas scattered in small   bowel with few fluid levels. Probable acute enteritis. Moderate amount of stool present throughout most of colon. Moderate to   marked sigmoid diverticulosis, without diverticulitis. All mesenteric arteries and major branches are patent and opacified. Marked lumbar dextroscoliosis with moderate to marked multilevel spondylotic   changes. Moderate compression of upper aspect of L2 vertebral body, probably   chronic. XR CHEST PORTABLE   Final Result      Increased interstitial markings likely suggestive of mild pulmonary edema. Assessment:    Principal Problem:    Sepsis ([address]  Active Problems:    Hypertension    Intellectual disability    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[address]    Tardive dyskinesia  Resolved Problems:    * No resolved hospital problems.  *      Patient Active Problem List    Diagnosis Date Noted    Sepsis ([address] 03/03/2023    Hypertension 03/03/2023    Intellectual disability 03/03/2023    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[address] 03/03/2023    Tardive dyskinesia 03/03/2023       Plan:     MEDICAL DECISION MAKING:    Primary Problem(s): Sepsis ([address]  Differential diagnoses: Sepsis  Condition is an undiagnosed new problem with uncertain prognosis  Condition is stable  Treatment plan: Consultation: Infectious disease, podiatry  Imaging: CT right lower extremity ordered  Medications:   Stop Cipro and Flagyl  Start Ancef per ID  Add vancomycin  Continue IV fluids  Medication Monitoring / High Risk Medications: Vancomycin   Cultures x2-pending     Cellulitis right lower extremity  Condition is stable  Treatment plan: Consultation: Podiatry, infectious disease  Imaging: CT right lower leg ordered-negative for abscess  Medications:   IV Ancef  IV vancomycin      Nutrition status:   at risk for malnutrition  Dietician consult initiated    Hospital Prophylaxis:   DVT: Lovenox   Stress Ulcer: PPI     MDM Data:   Test interpretation:  My independent EKG interpretation: sinus tachycardia  My independent X-ray interpretation:   Reviewed all radiology studies  Management and/or test interpretation discussed with ER MD at time of admission  Consults and Nursing notes were personally reviewed, all current labs and imaging were personally reviewed, tests ordered: CBC, BMP, and history obtained by independent historian       Disposition:  Shared decision making: All test results, treatment options and disposition options were discussed with the patient today  Social determinants of health that may impact management:  Resides at [facility]  Code status: Full Code   Disposition: Discharge plan is back to [facility]        Critical Care Time:  Total critical care time caring for this patient with life threatening, unstable organ failure, including direct patient contact, management of life support systems, review of data including imaging and labs, discussions with other team members and physicians at least 0 minutes so far today, excluding separately billable procedures. [facility] Medication Reconciliation documentation:    [x] I have utilized all available immediate resources to obtain, update, or review the patient's current medications (including all prescriptions, over-the-counter products, herbals, cannabinoid products and bitamin/mineral/dietary/nutritional supplements.   Hattie.Link 'yes\", Marvin Cervantes     []  The patient is not eligible for medication reconciliation; the patient is in an emergent medical situation where delaying treatment would jeopardize the patient's health    []  I did NOT confirm, update or review the patient's current list of medications today. [DOES NOT SATISFY [facility] PERFORMANCE]        [facility] Advanced Care Planning documentation:  [x] I have confirmed that the patient's Advance Care Plan is present, Code Status is documented, or surrogate decision maker is listed in the patient's medical record  [If \"yes\", STOP HERE]     [] The patient's Advance Care Plan is NOT present because:    []  I confirmed today that the patient does not wish or was not able to name a   surrogate decision maker or provide and advance care plan.    [] Hospice care is currently being provided or has been provided within the   calendar year. []  I did NOT confirm today the presence of an [address] or surrogate   decision maker documented within the patient's medical record. [DOES NOT SATISFY [facility] PERFORMANCE]    CORE MEASURES  DVT prophylaxis: Lovenox  Decubitus ulcer present on admission: No  CODE STATUS: FULL CODE  Nutrition Status: poor  Physical therapy: No   Old Charts reviewed: Yes  EKG Reviewed:  Yes  Advance Directive Addressed: Yes    MIGUELINA Elias - CNP, MIGUELINA, NP-C  3/3/2023, 9:10 AM

## 2023-03-03 NOTE — RT PROTOCOL NOTE
RESPIRATORY ASSESSMENT PROTOCOL                                                                                              Patient Name: Elier Salgado Room#: 9836/2288-68 : 1950     Admitting diagnosis: Dehydration [E86.0]  Gastroenteritis [K52.9]  Sepsis ([facility] [A41.9]  Sepsis, due to unspecified organism, unspecified whether acute organ dysfunction present [facility]) [A41.9]       Medical History:   Past Medical History:   Diagnosis Date    Arthritis     Cellulitis of right lower extremity 3/3/2023    Falls frequently     2011    Femur fracture ([facility]     Fibrocystic breast     Hyperlipidemia     Hypertension     Kyphosis deformity of spine     Mental retardation     moderate    Osteoporosis     [facility]     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[facility]     disorganized type    Scoliosis deformity of spine     Tardive dyskinesia        PATIENT ASSESSMENT    LABORATORY DATA  Hematology:   Lab Results   Component Value Date/Time    WBC 18.7 2023 09:00 PM    RBC 5.37 2023 09:00 PM    RBC 4.98 01/10/2012 06:10 AM    HGB 15.3 2023 09:00 PM    HCT 46.2 2023 09:00 PM     2023 09:00 PM     01/10/2012 06:10 AM     Chemistry:  No results found for: PHART, RGL7WZI, PO2ART, B2DLRYFJ, FHO7XYK, PBEA    VITALS  Heart Rate: (!) 108   Resp: 24  BP: 132/72  SpO2: 96 % O2 Device: None (Room air)  Temp: 98.6 °F (37 °C)    SKIN COLOR  [x] Normal  [] Pale  [] Dusky  [] Cyanotic    RESPIRATORY PATTERN  [] Normal  [x] Dyspnea  [] Cheyne-Baer  [] Kussmaul  [] Biots    AMBULATORY  [] Yes  [x] No  [] With Assistance      Patient Acuity 0 1 2 3 4 Score   Level of Consciousness (LOC) [x]  Alert & Oriented or Pt normal LOC []  Confused;follows directions []  Confused & uncooper-ative []  Obtunded []  Comatose 0   Respiratory Rate  (RR) []  Reg. rate & pattern. 12 - 20 bpm  []  Increased RR.  Greater than 20 bpm   [x]  SOB w/ exertion or RR greater than 24 bpm []  Access- ory muscle use at rest. Abn.  resp. []  SOB at rest.   2   Bilateral Breath Sounds (BBS) []  Clear []  Diminish-ed bases  [x]  Diminish-ed t/o, or rales   []  Sporadic, scattered wheezes or rhonchi []  Persistentwheezes and, or absent BBS 2   Cough []  Strong, effective, & non-prod. [x]  Effective & prod. Less than 25 ml (2 TBSP) over past 24 hrs []  Ineffective & non-prod to less than 25 ML over past 24 hrs []  Ineffective and, or greater than 25 ml sputum prod. past 24 hrs. []  Nonspon- taneous; Requires suctioning 1   Pulmonary History  (PULM HX) [x]  No smoking and no chronic pulmonary history []  Former smoker. Quit over 12 mos. ago []  Current smoker or quit w/ in 12 mos []  Pulm. History and, or 20 pk/yr smoking hx []  Admitted w/ acute pulm. dx and, or has been admitted w/ pulm. dx 2 or more times over past 12 mos 0   Surgical History this Admit  (SURG HX) [x]  No surgery []  General surgery []  Lower abdominal []  Thoracic or upper abdominal   []  Thoracic w/ pulm. disease 0   Chest X-Ray (CXR)/CT Scan []  Clear or not applicable []  Not available [x]  Atelectasis or pleural effusions []  Localized infiltrate or pulm. edema []  Con-solidated Infiltrates, bilateral, or in more than 1 lobe 2   TOTAL ACUITY: 7       CARE PLAN    If Acuity Level is 2, 3, or 4 in any of the following:    [] BILATERAL BREATH SOUNDS (BBS)     [] PULMONARY HISTORY (PULM HX)  [] Respiratory Rate  (RR)    Goal: Improve respiratory functions in patients with airway disease and decrease WOB    [] AEROSOL PROTOCOL    Total Acuity:   14-28  []  Secondary Assessment in 24 hrs Total Acuity:  9-13  []  Secondary Assessment in 24 hrs Total Acuity:  4-8  [x]  Secondary Assessment in 24 hrs Total Acuity:  0-3  []  Secondary Assessment in 48 hrs   HHN AEROSOL THERAPY with  [physician-ordered bronchodilator(s)] q 4 & Albuterol PRN q2 hrs. Breath-Actuated Neb if BBS Acuity = 4, and pt. can use MP. Notify physician if condition deteriorates.   HHN AEROSOL THERAPY with  [physician-ordered bronchodilator(s)]  QID and Albuterol PRN q4 hrs. Breath-Actuated Neb if BBS Acuity = 4, and pt. can use MP. Notify physician if condition deteriorates. MDI THERAPY with  2 actuations of [physician-ordered bronchodilator(s)] via spacer TID Albuterol and PRN q4 hrs. If unable to utilize MDI: HHN [physician-ordered bronchodilator(s)] TID and Albuterol PRN q4 hrs. Notify physician if condition deteriorates. MDI THERAPY with  [physician-ordered bronchodilator(s)] via spacer TID PRN. If unable to utilize MDI: HHN [physician-ordered bronchodilator(s)] TID PRN. Notify physician if condition deteriorates. If Acuity Level is 2, 3, or 4 in any of the following:    [] COUGH     [] SURGICAL HISTORY (SURG HX)  [x] CHEST XRAY (CXR)    Goal: Improvement in sputum mobilization in patients with ineffective airway clearance. Reverse atelectasis. [x] Bronchopulmonary Hygiene Protocol                    Pt is MRDD and is combative tolerating only Montey Baas txs. ..  Pt will not tolerate EZPAP      Total Acuity:   14-28  []  Secondary Assessment in 24 hrs Total Acuity:  9-13  []  Secondary Assessment in 24 hrs Total Acuity:  4-8  [x]  Secondary Assessment in 24 hrs Total Acuity:  0-3  []  Secondary Assessment in 48 hrs   METANEB QID with [physician-ordered bronchodilator(s)] if CXR Acuity = 4; otherwise:  PD&P, Oscillatory Therapy, or Vest QID & PRN AND PEP QID & PRN  NT Sxn PRN for ineffective cough  METANEB QID with [physician-ordered bronchodilator(s)] if CXR Acuity = 4; otherwise:  PD&P, Oscillatory Therapy or Vest QID & PRN AND PEP QID & PRN  NT Sxn PRN for ineffective cough  PD&P, Oscillatory Therapy, or Vest TID & PRN AND PEP TID & PRN   Instruct patient to self-perform IS q1hr WA       If Acuity Level is 2 or above in the following:    [] PULMONARY HISTORY (PULM HX)    Goal: Assist patient in quitting smoking to slow or stop the progression of lung disease.     [] Smoking Cessation Protocol    SMOKING CESSATION EDUCATION provided according to policy [id number]: (leonor with an X)  ____Yes    ____ No     ____ NA    Smoking Cessation Booklet given:  ____Yes  ____No ____Patient Qamar Bookbinder

## 2023-03-03 NOTE — CONSULTS
[facility] - Initial Consult Note  Today's Date and Time: 3/3/2023, 9:04 AM    Impression :   RLE cellulitis. Appearance suggestive of Strep cellulitis  Fever  Leukocytosis  HTN  MRDD  Schizophrenia  Tardive dyskinesia  Frequent falls  Osteoporosis  Kyphosis  PCN allergy    Recommendations:   3/3/23: Discontinue IV clindamycin  3/3/23: IV Cefazolin 2 gm q 8 hr initiated for RLE cellulitis. Follow-up with culture results    Medical Decision Making/Summary/Discussion:3/3/2023       Infection Control Recommendations   [place] Precautions    Antimicrobial Stewardship Recommendations     Simplification of therapy  Targeted therapy  Coordination of Outpatient Care:   Estimated Length of IV antimicrobials:TBD  Patient will need Midline Catheter Insertion: No  Patient will need PICC line Insertion:No  Patient will need: Home IV , [place],  SNF,  LTAC: TBD  Patient will need outpatient wound care:no    Chief complaint/reason for consultation:   sepsis      History of Present Illness:   Yonny Pop is a 71y.o.-year-old female who was initially admitted on 3/2/2023. Patient seen at the request of Dr. Ronaldo Alberts:    This patient, with MRDD, presented from her group home with fever x 1 day of 40.1 Celsius. She has not experienced N/V/D and her vital signs are stable on room air. She is mostly non-verbal.    Donivan Daniel is mostly unremarkable other than a WBC of 18.1. Viral respiratory panel was negative. Blood cultures have yielded no growth and urinalysis is not indicative of a UTI. She has a wound on her right ankle with surrounding redness and warmth to her RLE. A CT showed mild pulmonary vascular congestion with mild, dependant atelectatic changes in the posterior lung bases. Multiple hypodense lesions were noted on the liver. The pancrease is markedly atrophic without acute process.    Findings in the bowel showing probable enteritis and marked sigmoid diverticulosis without diverticulitis. The patient was given a dose of flagyl and Cipro, and was initiated on clindamycin. She does have a penicillin allergy. CT chest/abd/pelvis 3/2/23  Impression   Mild dependent atelectatic changes at the posterior lung bases bilaterally. Otherwise no confluent pneumonia or pulmonary atelectasis. No pleural   effusion or pneumothorax. Minimal pulmonary vascular congestion. Probable   mild interstitial pulmonary edema in lungs. No acute process in the mediastinum. Normal thoracic aorta. No evidence of   pulmonary embolism in the visualized central pulmonary arteries, central   branches and paracentral branches of pulmonary arteries. There is small to moderate retrocardiac hiatal hernia. There are multiple hypodense nonenhancing lesions scattered in liver most   likely to be multiple hepatic cysts. No diagnostic finding in gallbladder, spleen, and adrenal glands. Moderate   to markedly atrophic pancreas without any acute process. No renal stone or   obstructive uropathy. Normal appendix visualized. Small amount of increased fluid and small amount of gas scattered in small   bowel with few fluid levels. Probable acute enteritis. Moderate amount of stool present throughout most of colon. Moderate to   marked sigmoid diverticulosis, without diverticulitis. All mesenteric arteries and major branches are patent and opacified. Marked lumbar dextroscoliosis with moderate to marked multilevel spondylotic   changes. Moderate compression of upper aspect of L2 vertebral body, probably   chronic.              ID was consulted for antibiotic recommendations    CURRENT EVALUATION 3/3/2023  /72   Pulse (!) 107   Temp 98.6 °F (37 °C) (Temporal)   Resp 24   Ht 5' (1.524 m)   Wt 139 lb 12.4 oz (63.4 kg)   SpO2 96%   BMI 27.30 kg/m²     Afebrile  VS stable    Patient stable  No complaints  No new issues per RN  Has Rt foot wound with ascending cellulitis    Appearance of the border of cellulitis suggests Streptococcal cellulitis. Medications reviewed      Labs, X rays reviewed: 3/3/2023    BUN:21  Cr:0.69    WBC:18.7  Hb:15.3  Plat: 257    CRP:Pending    Cultures:  Urine:  3/2/23: No UTI  Blood:  3/2/23: No growth thus far  Sputum :    Wound:    MRSA Nares:  3/3/23: negative      Imaging:    RLE 3/2/23          CT chest 3/2/23      CXR 3/2/23          Discussed with BAN NGUYEN. I have personally reviewed the past medical history, past surgical history, medications, social history, and family history, and I have updated the database accordingly. 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  soft tissue mass    FOOT SURGERY Left 02/20/2015    FOOT SURGERY N/A 6/21/2013    removal of verruca    FOOT SURGERY Right 10/2012    surgical removal of warts    ORIF FEMUR DECOMPRESSION Right 1/11/2011    OTHER SURGICAL HISTORY Left 10/16/15    Nail bed excision     POLYPECTOMY  1/1995    vocal cord       Medications:      sodium chloride flush  5-40 mL IntraVENous 2 times per day    enoxaparin  40 mg SubCUTAneous Daily    metroNIDAZOLE  500 mg IntraVENous Q8H    ciprofloxacin  400 mg IntraVENous Q12H       Social History:     Social History     Socioeconomic History    Marital status: Single     Spouse name: Not on file    Number of children: Not on file    Years of education: Not on file    Highest education level: Not on file   Occupational History    Not on file   Tobacco Use    Smoking status: Never    Smokeless tobacco: Not on file   Substance and Sexual Activity    Alcohol use: No    Drug use: Not on file    Sexual activity: Not on file   Other Topics Concern    Not on file   Social History Narrative    Not on file     Social Determinants of Health     Financial Resource Strain: Not on file   Food Insecurity: Not on file   Transportation Needs: Not on file   Physical Activity: Not on file   Stress: Not on file   Social Connections: Not on file   Intimate Partner Violence: Not on file   Housing Stability: Not on file       Family History:   History reviewed. No pertinent family history. Allergies:   Risperdal [risperidone], Thorazine [chlorpromazine], and Penicillins     Review of Systems:   TANISHA  Constitutional: No fevers or chills. No systemic complaints  Head: No headaches  Eyes: No double vision or blurry vision. No conjunctival inflammation. ENT: No sore throat or runny nose. . No hearing loss, tinnitus or vertigo. Cardiovascular: No chest pain or palpitations. No shortness of breath. No COYNE  Lung: No shortness of breath or cough. No sputum production  Abdomen: No nausea, vomiting, diarrhea, or abdominal pain. Nadia Alberto No cramps. Genitourinary: No increased urinary frequency, or dysuria. No hematuria. No suprapubic or CVA pain  Musculoskeletal: No muscle aches or pains. No joint effusions, swelling or deformities  Hematologic: No bleeding or bruising. Neurologic: No headache, weakness, numbness, or tingling. Integument: No rash, no ulcers. Psychiatric: No depression. Endocrine: No polyuria, no polydipsia, no polyphagia.     Physical Examination :   Patient Vitals for the past 8 hrs:   BP Temp Temp src Pulse Resp SpO2 Height Weight   03/03/23 0839 -- -- -- -- -- -- 5' (1.524 m) --   03/03/23 0800 132/72 98.6 °F (37 °C) Temporal (!) 107 24 96 % -- --   03/03/23 0253 (!) 168/84 99.3 °F (37.4 °C) Temporal (!) 110 21 96 % 5' (1.524 m) 139 lb 12.4 oz (63.4 kg)     General Appearance: Awake, alert, non-verbal, MRDD hx  Head:  Normocephalic, no trauma  Eyes: Pupils equal, round, reactive to light and accommodation; extraocular movements intact; sclera anicteric; conjunctivae pink. No embolic phenomena. ENT: Oropharynx clear, without erythema, exudate, or thrush. No tenderness of sinuses. Mouth/throat: mucosa pink and moist. No lesions. Dentition in good repair. Neck:Supple, without lymphadenopathy. Thyroid normal, No bruits. Pulmonary/Chest: Diminished to auscultation, without wheezes, rales, or rhonchi. No dullness to percussion. Cardiovascular: Regular rate and rhythm without murmurs, rubs, or gallops. Abdomen: Soft, non tender. Bowel sounds normal. No organomegaly  All four Extremities: RLE ankle scab with surrounding erythema  Neurologic:non-verbal, MRDD hx  Skin: Warm and dry with good turgor. No signs of peripheral arterial or venous insufficiency. RLE ankle scab with surrounding erythema going up the leg. Circinate border of erythema. Medical Decision Making -Laboratory:   I have independently reviewed/ordered the following labs:    CBC with Differential:   Recent Labs     03/02/23  2100   WBC 18.7*   HGB 15.3*   HCT 46.2      LYMPHOPCT 5*   MONOPCT 3     BMP:   Recent Labs     03/02/23  2100 03/03/23  0540    137   K 4.1 3.8    107   CO2 23 21   BUN 29* 21   CREATININE 0.89 0.69     Hepatic Function Panel:   Recent Labs     03/02/23  2100 03/03/23  0540   PROT 7.2 6.2*   LABALBU 4.0 3.3*   BILITOT 0.3 0.4   ALKPHOS 112* 97   ALT 17 14   AST 15 15     No results for input(s): RPR in the last 72 hours. No results for input(s): HIV in the last 72 hours. No results for input(s): BC in the last 72 hours.   Lab Results   Component Value Date/Time    MUCUS 1+ 03/02/2023 10:08 PM    RBC 5.37 03/02/2023 09:00 PM    RBC 4.98 01/10/2012 06:10 AM    TRICHOMONAS NOT REPORTED 01/15/2019 06:35 PM WBC 18.7 03/02/2023 09:00 PM    YEAST NOT REPORTED 01/15/2019 06:35 PM    TURBIDITY Clear 03/02/2023 10:08 PM     Lab Results   Component Value Date/Time    CREATININE 0.69 03/03/2023 05:40 AM    GLUCOSE 135 03/03/2023 05:40 AM    GLUCOSE 86 01/10/2012 06:10 AM       Medical Decision Making-Imaging:     Narrative   EXAMINATION:   CT OF THE CHEST, ABDOMEN, AND PELVIS WITH CONTRAST 3/2/2023 9:54 pm       TECHNIQUE:   CT of the chest, abdomen and pelvis was performed with the administration of   intravenous contrast. Multiplanar reformatted images are provided for review. Automated exposure control, iterative reconstruction, and/or weight based   adjustment of the mA/kV was utilized to reduce the radiation dose to as low   as reasonably achievable. COMPARISON:   None       HISTORY:   ORDERING SYSTEM PROVIDED HISTORY: Fever, leukocytosis, nonverbal   TECHNOLOGIST PROVIDED HISTORY:   Fever, leukocytosis, nonverbal       Decision Support Exception - unselect if not a suspected or confirmed   emergency medical condition->Emergency Medical Condition (MA)       FINDINGS:       Chest:       Mediastinum: No evidence of mass or pathologic lymphadenopathy or acute   process in the mediastinum. Thoracic aorta is of normal size, without evidence of aneurysm or dissection. In the opacified central pulmonary arteries and their central and paracentral   branches there is no definable pulmonary embolism. No evidence of pericardial effusion or pericardial thickening. Evidence of   coronary artery calcification. Lungs/pleura: Evidence of mild streaky atelectatic changes, with or without   subtle infiltrates in the right pulmonary lower lobe. In the rest of both   lungs, there is no other focal infiltrates or atelectatic changes. There is   mild pulmonary vascular congestion. The interstitial markings are hazy and   therefore interstitial pulmonary edema is not excluded.   No evidence of pneumothorax. Soft Tissues/Bones: In the thoracic spine, evidence of moderate kyphosis and   moderate multilevel degenerative disc disease without definable fracture. Sternum is intact. In visualized bilateral ribs there is no definable   fracture. Abdomen/Pelvis:       Organs: No evidence of pneumoperitoneum. There is small to moderate size   retrocardiac hiatal hernia. In the lower lateral portion of right hepatic lobe, there is hypodense   nonenhancing lesion measuring 2.3 cm in diameter. In the upper most portion   of right hepatic lobe there is a hypodense nonenhancing lesion measuring 1.6   cm in diameter. In the upper portion of right hepatic lobe there is another   hypodense nonenhancing lesion measuring 0.8 cm. At the inferior aspect of   right hepatic lobe there is a tiny nonenhancing lesion measuring 0.55 cm. At   the inferior aspect of left medial segment of liver there is a tiny   nonenhancing lesion measuring 0.4 cm. In the midportion right hepatic lobe   there is nonenhancing lesion measuring 0.45 cm. These multiple nonenhancing   hepatic lesions are most likely to be hepatic cysts. Normal size of spleen with a few tiny calcified granulomas. Normal bilateral adrenal glands. Moderate to markedly atrophic pancreas without focal abnormality and without   any acute process. In the right and left kidney there are a few tiny subcentimeter cortical   cysts without any other mass. No evidence of stone or hydronephrosis in   either kidney. There is no obstructive uropathy. Bladder is mildly distended without evidence of stone or focal abnormality. GI/Bowel: No diagnostic finding in visualized stomach. Small to moderate   amount of gas scattered in proximal and mid small bowel and minimal gas   scattered in distal small bowel. Mild increased fluid contents in the distal   small bowel.   There are a few small fluid levels in small bowel.  No obvious   small bowel wall thickening. Normal appendix posteroinferior to cecum. Moderate amount of stool and small amount of gas are present in the right   colon and transverse colon. Descending colon contains small amount of stool   and gas. In the sigmoid colon small amount of stool and gas are scattered. In rectum moderate amount of stool and gas are present. No perirectal abnormality. Evidence of moderate to marked diverticulosis of sigmoid colon without   evidence of diverticulitis. No evidence of colitis. Pelvis: No abnormal fluid collection or focal inflammatory process in the   pelvis. No pelvic mass or pathologic lymphadenopathy. Peritoneum/Retroperitoneum: Abdominal aorta is of normal size with mild   calcified plaques, without aortic dissection. No evidence of periaortic or   mesenteric pathologic lymphadenopathy. Normally opacified patent celiac artery and branches and SMA and branches. GA is also patent. Bilateral renal arteries are grossly normally patent. No evidence of ascites. Normally patent and opacified portal vein, the splenic vein and the SMV. Bones/Soft Tissues: Evidence of severe dextrorotatory scoliosis in lumbar   spine with multilevel moderate to severe degenerative disc disease. At L5-S1   level there is grade 2 spondylolisthesis with bilateral pars defects and   advanced DDD and facet osteoarthritis. At L1-L2 level moderate degenerative disc disease with moderate compression   of upper aspect of L2 vertebral body, probably chronic. No evidence of   inguinal, femoral or ventral hernia. Evidence of previous internal fixation of fracture in the proximal right   femur. Impression   Mild dependent atelectatic changes at the posterior lung bases bilaterally. Otherwise no confluent pneumonia or pulmonary atelectasis. No pleural   effusion or pneumothorax.   Minimal pulmonary vascular congestion. Probable   mild interstitial pulmonary edema in lungs. No acute process in the mediastinum. Normal thoracic aorta. No evidence of   pulmonary embolism in the visualized central pulmonary arteries, central   branches and paracentral branches of pulmonary arteries. There is small to moderate retrocardiac hiatal hernia. There are multiple hypodense nonenhancing lesions scattered in liver most   likely to be multiple hepatic cysts. No diagnostic finding in gallbladder, spleen, and adrenal glands. Moderate   to markedly atrophic pancreas without any acute process. No renal stone or   obstructive uropathy. Normal appendix visualized. Small amount of increased fluid and small amount of gas scattered in small   bowel with few fluid levels. Probable acute enteritis. Moderate amount of stool present throughout most of colon. Moderate to   marked sigmoid diverticulosis, without diverticulitis. All mesenteric arteries and major branches are patent and opacified. Marked lumbar dextroscoliosis with moderate to marked multilevel spondylotic   changes. Moderate compression of upper aspect of L2 vertebral body, probably   chronic. Narrative   EXAMINATION:   ONE XRAY VIEW OF THE CHEST       3/2/2023 9:13 pm       COMPARISON:   None. HISTORY:   ORDERING SYSTEM PROVIDED HISTORY: fever   TECHNOLOGIST PROVIDED HISTORY:   fever       FINDINGS:   Chest radiograph: The cardiomediastinal silhouette and hilar contours are   prominent with congestion of bilateral mary. Increased interstitial markings   likely suggestive of mild pulmonary edema. The lungs are otherwise clear   with no focal consolidation, pleural effusion or pneumothorax. The overlying   soft tissue and osseous structures do not demonstrate acute abnormality. Unchanged chronic fracture deformities of midthoracic vertebral bodies.            Impression       Increased interstitial markings likely suggestive of mild pulmonary edema. Medical Decision [id number]:       Medical Decision Making-Other:     Note:  Labs, medications, radiologic studies were reviewed with personal review of films  Large amounts of data were reviewed  Discussed with nursing Staff, Discharge planner  Infection Control and Prevention measures reviewed  All prior entries were reviewed  Administer medications as ordered  Prognosis: [address]  Discharge planning reviewed    Thank you for allowing us to participate in the care of this patient. Please call with questions. MIGUELINA Quiñones - CNP    ATTESTATION:    I have discussed the case, including pertinent history and exam findings with the APRN. I have evaluated the  History, physical findings and pictures of the patient and the key elements of the encounter have been performed by me. I have reviewed the laboratory data, other diagnostic studies and discussed them with the APRN. I have updated the medical record where necessary. I agree with the assessment, plan and orders as documented by the APRN.     Jamie Willson MD.    Pager: [phone] - Office: [phone]

## 2023-03-03 NOTE — ANESTHESIA POSTPROCEDURE EVALUATION
Department of Anesthesiology  Postprocedure Note    Patient: Jennifer Mcpherson  MRN: [id number]  [place]: 1950  Date of evaluation: 3/3/2023      Procedure Summary     Date: 03/03/23 Room / Location: [address] [place] [address] 1 / [address]    Anesthesia Start: 1629 Anesthesia Stop: 1790    Procedure: FOOT DEBRIDEMENT INCISION AND DRAINAGE (Right: Foot) Diagnosis:       Infected abrasion of foot, right, initial encounter      (INFECTED FOOT)    Surgeons: Ilene Alvarez DPM Responsible Provider: MIGUELINA Berry CRNA    Anesthesia Type: general ASA Status: 3 - Emergent          Anesthesia Type: No value filed. Aditya Phase I: Aditya Score: 8    Aditya Phase II:        Anesthesia Post Evaluation    Patient location during evaluation: bedside  Patient participation: complete - patient participated  Level of consciousness: awake and alert  Airway patency: patent  Nausea & Vomiting: no nausea and no vomiting  Complications: no  Cardiovascular status: hemodynamically stable  Respiratory status: acceptable  Hydration status: stable  Comments: Pt coughing occasionally, but much improved from before. Pt sister at bedside.  VSS

## 2023-03-03 NOTE — PROGRESS NOTES
[facility] at this time via cart. Patient was *transferred/pulled over to the hospital bed via x4 nurses. Patient is disorientated x4 and is currently yelling and combative at this time. Patient is [facility]. Patient's brief  and pad were heavily saturated. Patient's brief, gown and pad changed. Patient continued to punch, pinch, and even attempt to bite while staff was changing patient. Vital signs obtained. BP elevated but patient was yelling and moving while BP was being obtained.

## 2023-03-03 NOTE — CONSULTS
Vancomycin Dosing by Pharmacy - Initial Note   TODAY'S DATE:  3/3/2023  Patient name, age:  Bran Rogers, 71 y.o. Indication: Sepsis of unknown origin, empiric                    SSTI  Additional antimicrobials:  cefazolin    Allergies:  Risperdal [risperidone], Thorazine [chlorpromazine], and Penicillins   Actual Weight:    Wt Readings from Last 1 Encounters:   03/03/23 139 lb 12.4 oz (63.4 kg)     Labs/Ancillary Data  Estimated Creatinine Clearance: 62 mL/min (based on SCr of 0.69 mg/dL). Recent Labs     03/02/23  2100 03/03/23  0540   CREATININE 0.89 0.69   BUN 29* 21   WBC 18.7*  --        Intake/Output Summary (Last 24 hours) at 3/3/2023 1052  Last data filed at 3/3/2023 0253  Gross per 24 hour   Intake --   Output 1 ml   Net -1 ml     Temp: 104.1 F    Recent vancomycin administrations        No vancomycin IV orders with administrations found. Orders not given:            vancomycin (VANCOCIN) intermittent dosing (placeholder)    vancomycin (VANCOCIN) 750 mg in sodium chloride 0.9 % 250 mL IVPB                  Culture Date / Source  /  Results  3/2/23              Blood 1 & 2    No growth 10 hours    MRSA Nasal Swab: was ordered by provider, awaiting results. Jossie Toscano PLAN   Vancomycin 750 mg IV every 12 hours with predicted  and trough at steady state 14.9 mg/L. Ensured BUN/sCr ordered at baseline and every 48 hours x at least 3 levels, then at least weekly. Vancomycin level ordered for 3/5/23 @ 0500. Will use bayesian method for dosing. This level will not be a trough. Target AUC/TEMI: 400-600.       Vancomycin Target Concentration Parameters  Treatment  Population Target AUC/TEMI Target Trough   Invasive MRSA Infection (bacteremia, pneumonia, meningitis, endocarditis, osteomyelitis)  Sepsis (undifferentiated) 400-600 N/A   Infection due to non-MRSA pathogen  Empiric treatment of non-invasive MRSA infection  (SSTI, UTI) <500 10-15 mg/L   CrCl < 29 mL/min  Rapidly fluctuating serum creatinine   MONSERRAT N/A < 15 mg/L     Renal replacement therapy is dosed by levels, per hospital protocol. Abbreviations  * Pauc: probability that AUC is >400 (efficacy); Pconc: probability that Ctrough is above 20 ?g/mL (toxicity); Tox: Probability of nephrotoxicity, based on Shiloh et al. Clin Infect Dis 2009. Thank you for the consult. Pharmacy will continue to follow. Matthew Zhong.  Sohan Gonzalez

## 2023-03-03 NOTE — PLAN OF CARE
Faye Collazo in [facility]ons[facility] office given consult information and will relay to Dr. Eloy Zuniga.  Jairo Verduzco RN

## 2023-03-03 NOTE — ADDENDUM NOTE
Addendum  created 03/03/23 1810 by MIGUELINA Nelson CRNA    Intraprocedure Meds edited, Orders acknowledged in Narrator

## 2023-03-03 NOTE — PROGRESS NOTES
[facility]. Pt alert to self, non verbal. Vitals and assessment completed at this time. Pt appears to be agitated, easily consoled by family and care taker. Call light is within reach, bed alarm on. Will continue to monitor.

## 2023-03-04 LAB
ABSOLUTE EOS #: <0.03 K/UL (ref 0–0.44)
ABSOLUTE IMMATURE GRANULOCYTE: 0.03 K/UL (ref 0–0.3)
ABSOLUTE LYMPH #: 0.82 K/UL (ref 1.1–3.7)
ABSOLUTE MONO #: 0.64 K/UL (ref 0.1–1.2)
ALBUMIN SERPL-MCNC: 3 G/DL (ref 3.5–5.2)
ALBUMIN/GLOBULIN RATIO: 1.2 (ref 1–2.5)
ALP SERPL-CCNC: 84 U/L (ref 35–104)
ALT SERPL-CCNC: 13 U/L (ref 5–33)
ANION GAP SERPL CALCULATED.3IONS-SCNC: 8 MMOL/L (ref 9–17)
AST SERPL-CCNC: 18 U/L
BASOPHILS # BLD: 0 % (ref 0–2)
BASOPHILS ABSOLUTE: <0.03 K/UL (ref 0–0.2)
BILIRUB SERPL-MCNC: 0.2 MG/DL (ref 0.3–1.2)
BUN SERPL-MCNC: 17 MG/DL (ref 8–23)
BUN/CREAT BLD: 27 (ref 9–20)
CALCIUM SERPL-MCNC: 8.5 MG/DL (ref 8.6–10.4)
CHLORIDE SERPL-SCNC: 108 MMOL/L (ref 98–107)
CO2 SERPL-SCNC: 21 MMOL/L (ref 20–31)
CREAT SERPL-MCNC: 0.62 MG/DL (ref 0.5–0.9)
EOSINOPHILS RELATIVE PERCENT: 0 % (ref 1–4)
GFR SERPL CREATININE-BSD FRML MDRD: >60 ML/MIN/1.73M2
GLUCOSE SERPL-MCNC: 120 MG/DL (ref 70–99)
HCT VFR BLD AUTO: 37.8 % (ref 36.3–47.1)
HGB BLD-MCNC: 12.4 G/DL (ref 11.9–15.1)
IMMATURE GRANULOCYTES: 0 %
LYMPHOCYTES # BLD: 7 % (ref 24–43)
MCH RBC QN AUTO: 28.5 PG (ref 25.2–33.5)
MCHC RBC AUTO-ENTMCNC: 32.8 G/DL (ref 28.4–34.8)
MCV RBC AUTO: 86.9 FL (ref 82.6–102.9)
MONOCYTES # BLD: 6 % (ref 3–12)
MRSA, DNA, NASAL: NEGATIVE
NRBC AUTOMATED: 0 PER 100 WBC
PDW BLD-RTO: 15 % (ref 11.8–14.4)
PLATELET # BLD AUTO: 164 K/UL (ref 138–453)
PMV BLD AUTO: 10.7 FL (ref 8.1–13.5)
POTASSIUM SERPL-SCNC: 3.8 MMOL/L (ref 3.7–5.3)
PROT SERPL-MCNC: 5.6 G/DL (ref 6.4–8.3)
RBC # BLD: 4.35 M/UL (ref 3.95–5.11)
SEG NEUTROPHILS: 87 % (ref 36–65)
SEGMENTED NEUTROPHILS ABSOLUTE COUNT: 9.85 K/UL (ref 1.5–8.1)
SODIUM SERPL-SCNC: 137 MMOL/L (ref 135–144)
SPECIMEN DESCRIPTION: NORMAL
WBC # BLD AUTO: 11.4 K/UL (ref 3.5–11.3)

## 2023-03-04 PROCEDURE — 99232 SBSQ HOSP IP/OBS MODERATE 35: CPT | Performed by: INTERNAL MEDICINE

## 2023-03-04 PROCEDURE — 2580000003 HC RX 258: Performed by: INTERNAL MEDICINE

## 2023-03-04 PROCEDURE — 94664 DEMO&/EVAL PT USE INHALER: CPT

## 2023-03-04 PROCEDURE — 85025 COMPLETE CBC W/AUTO DIFF WBC: CPT

## 2023-03-04 PROCEDURE — 6360000002 HC RX W HCPCS: Performed by: PODIATRIST

## 2023-03-04 PROCEDURE — 94761 N-INVAS EAR/PLS OXIMETRY MLT: CPT

## 2023-03-04 PROCEDURE — 36415 COLL VENOUS BLD VENIPUNCTURE: CPT

## 2023-03-04 PROCEDURE — 94640 AIRWAY INHALATION TREATMENT: CPT

## 2023-03-04 PROCEDURE — 80053 COMPREHEN METABOLIC PANEL: CPT

## 2023-03-04 PROCEDURE — 1200000000 HC SEMI PRIVATE

## 2023-03-04 PROCEDURE — 6360000002 HC RX W HCPCS: Performed by: INTERNAL MEDICINE

## 2023-03-04 PROCEDURE — 6370000000 HC RX 637 (ALT 250 FOR IP): Performed by: PODIATRIST

## 2023-03-04 PROCEDURE — 2580000003 HC RX 258: Performed by: PODIATRIST

## 2023-03-04 RX ORDER — IPRATROPIUM BROMIDE AND ALBUTEROL SULFATE 2.5; .5 MG/3ML; MG/3ML
1 SOLUTION RESPIRATORY (INHALATION) 3 TIMES DAILY
Status: DISCONTINUED | OUTPATIENT
Start: 2023-03-04 | End: 2023-03-06 | Stop reason: HOSPADM

## 2023-03-04 RX ORDER — ALBUTEROL SULFATE 2.5 MG/3ML
2.5 SOLUTION RESPIRATORY (INHALATION) EVERY 4 HOURS PRN
Status: DISCONTINUED | OUTPATIENT
Start: 2023-03-04 | End: 2023-03-06 | Stop reason: HOSPADM

## 2023-03-04 RX ADMIN — VANCOMYCIN HYDROCHLORIDE 750 MG: 750 INJECTION, POWDER, LYOPHILIZED, FOR SOLUTION INTRAVENOUS at 08:59

## 2023-03-04 RX ADMIN — MUPIROCIN: 20 OINTMENT TOPICAL at 09:06

## 2023-03-04 RX ADMIN — MULTIPLE VITAMINS W/ MINERALS TAB 1 TABLET: TAB at 09:00

## 2023-03-04 RX ADMIN — FERROUS SULFATE TAB 325 MG (65 MG ELEMENTAL FE) 325 MG: 325 (65 FE) TAB at 09:00

## 2023-03-04 RX ADMIN — DOCUSATE SODIUM 100 MG: 100 CAPSULE, LIQUID FILLED ORAL at 09:00

## 2023-03-04 RX ADMIN — MEROPENEM 1000 MG: 1 INJECTION, POWDER, FOR SOLUTION INTRAVENOUS at 02:34

## 2023-03-04 RX ADMIN — POTASSIUM CHLORIDE 10 MEQ: 10 TABLET, EXTENDED RELEASE ORAL at 09:00

## 2023-03-04 RX ADMIN — LISINOPRIL 10 MG: 10 TABLET ORAL at 09:00

## 2023-03-04 RX ADMIN — ASPIRIN 81 MG: 81 TABLET, COATED ORAL at 09:00

## 2023-03-04 RX ADMIN — IPRATROPIUM BROMIDE AND ALBUTEROL SULFATE 1 AMPULE: .5; 3 SOLUTION RESPIRATORY (INHALATION) at 11:10

## 2023-03-04 RX ADMIN — LORAZEPAM 0.5 MG: 2 INJECTION, SOLUTION INTRAMUSCULAR; INTRAVENOUS at 17:15

## 2023-03-04 RX ADMIN — SODIUM CHLORIDE: 9 INJECTION, SOLUTION INTRAVENOUS at 00:28

## 2023-03-04 RX ADMIN — LORAZEPAM 0.5 MG: 2 INJECTION, SOLUTION INTRAMUSCULAR; INTRAVENOUS at 02:35

## 2023-03-04 RX ADMIN — KETOROLAC TROMETHAMINE 15 MG: 15 INJECTION, SOLUTION INTRAMUSCULAR; INTRAVENOUS at 19:01

## 2023-03-04 RX ADMIN — IPRATROPIUM BROMIDE AND ALBUTEROL SULFATE 1 AMPULE: .5; 3 SOLUTION RESPIRATORY (INHALATION) at 06:12

## 2023-03-04 RX ADMIN — OLANZAPINE 15 MG: 5 TABLET, FILM COATED ORAL at 17:06

## 2023-03-04 RX ADMIN — POTASSIUM CHLORIDE 10 MEQ: 10 TABLET, EXTENDED RELEASE ORAL at 20:15

## 2023-03-04 RX ADMIN — HYDROCHLOROTHIAZIDE 12.5 MG: 12.5 CAPSULE ORAL at 09:00

## 2023-03-04 RX ADMIN — PSYLLIUM HUSK 1 PACKET: 3.4 POWDER ORAL at 09:03

## 2023-03-04 RX ADMIN — CETIRIZINE HYDROCHLORIDE 10 MG: 10 TABLET, FILM COATED ORAL at 09:00

## 2023-03-04 RX ADMIN — ENOXAPARIN SODIUM 40 MG: 100 INJECTION SUBCUTANEOUS at 09:01

## 2023-03-04 RX ADMIN — VANCOMYCIN HYDROCHLORIDE 750 MG: 750 INJECTION, POWDER, LYOPHILIZED, FOR SOLUTION INTRAVENOUS at 21:35

## 2023-03-04 RX ADMIN — PANTOPRAZOLE SODIUM 40 MG: 40 TABLET, DELAYED RELEASE ORAL at 09:00

## 2023-03-04 RX ADMIN — ATORVASTATIN CALCIUM 20 MG: 20 TABLET, FILM COATED ORAL at 09:00

## 2023-03-04 RX ADMIN — Medication 2000 UNITS: at 09:03

## 2023-03-04 RX ADMIN — MEROPENEM 1000 MG: 1 INJECTION, POWDER, FOR SOLUTION INTRAVENOUS at 19:03

## 2023-03-04 RX ADMIN — MEROPENEM 1000 MG: 1 INJECTION, POWDER, FOR SOLUTION INTRAVENOUS at 10:30

## 2023-03-04 ASSESSMENT — PAIN SCALES - GENERAL: PAINLEVEL_OUTOF10: 6

## 2023-03-04 NOTE — PROGRESS NOTES
[address], [address]    Progress Note    Date:   3/4/2023  Patient name:  Cadence Dueñas  Date of admission:  3/2/2023  8:39 PM  MRN:   [id number]  YOB: 1950    SUBJECTIVE/Last 24 hours update:     Patient seen and examined at the bed side. The patient provides non-verbal communication and cannot provide any updates at this time. No known acute overnight events per RN staff. The patient states \"No\" when asked if she is in any pain. Notes from nursing staff and Consults had been reviewed, and the overnight progress had been checked with the nursing staff as well. REVIEW OF SYSTEMS:      CONSTITUTIONAL:  no fevers, no headcahes  EYES: negative for blury vision  HEENT: No headaches, No nasal congestion, no difficulty swallowing  RESPIRATORY:negative for dyspnea, no wheezing, no Cough  CARDIOVASCULAR: negative for chest pain, no palpitations  GASTROINTESTINAL: no nausea, no vomiting, no change in bowel habits, no abdominal pain   GENITOURINARY: negative for dysuria, no hematuria   MUSCULOSKELETAL: no joint pains, no muscle aches, no swelling of joints or extremities  NEUROLOGICAL: No  Weakness or numbness    PAST MEDICAL HISTORY:      has a past medical history of Arthritis, Cellulitis of right lower extremity, Falls frequently, Femur fracture ([address] Fibrocystic breast, Hyperlipidemia, Hypertension, Kyphosis deformity of spine, Mental retardation, Osteoporosis, Pelvis fracture ([address] 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[address] Scoliosis deformity of spine, and Tardive dyskinesia. PAST SURGICAL HISTORY:      has a past surgical history that includes Fibula Fracture Surgery (Left, 08/01/1985); polypectomy (01/01/1995); orif femur decompression (Right, 01/11/2011); Foot surgery (Left, 07/01/2012); Foot surgery (Left, 02/20/2015); Foot surgery (N/A, 06/21/2013);  Foot surgery (Right, 10/01/2012); other surgical history (Left, 10/16/2015); and Incision and drainage foot (03/03/2023). SOCIAL HISTORY:      reports that she has never smoked. She does not have any smokeless tobacco history on file. She reports that she does not drink alcohol. TOBACCO:   reports that she has never smoked. She does not have any smokeless tobacco history on file. ETOH:   reports no history of alcohol use. FAMILY HISTORY:   History reviewed. No pertinent family history. HOME MEDICATIONS:      Prior to Admission medications    Medication Sig Start Date End Date Taking? Authorizing Provider   meloxicam (MOBIC) 7.5 MG tablet Take 7.5 mg by mouth daily   Yes Historical Provider, MD   Cholecalciferol (VITAMIN D3) 50 MCG (2000 UT) CAPS Take 2,000 Units by mouth daily   Yes Historical Provider, MD   benzonatate (TESSALON) 200 MG capsule Take 200 mg by mouth 3 times daily as needed for Cough   Yes Historical Provider, MD   loratadine (CLARITIN) 10 MG tablet Take 10 mg by mouth daily as needed   Yes Historical Provider, MD   traMADol (ULTRAM) 50 MG tablet Take 50 mg by mouth every 6 hours as needed for Pain. Yes Historical Provider, MD   Wheat Dextrin (BENEFIBER) POWD Take 4 g by mouth 2 times daily (with meals) Patient only takes 1 tablespoon   Yes Historical Provider, MD   aluminum & magnesium hydroxide-simethicone (MAALOX) 200-200-20 MG/5ML SUSP suspension Take 5 mLs by mouth every 4 hours as needed for Indigestion   Yes Historical Provider, MD   petrolatum, lanolin, 8-hydroxyquinoline sulfate (BAG BALM) OINT Apply 1 Dose topically in the morning and at bedtime Apply topically to bilateral feet and both halluxes   Yes Historical Provider, MD   triazolam (HALCION) 0.25 MG tablet Take 0.25 mg by mouth once as needed. Take 1 hour before dental procedure 1/12/23   Historical Provider, MD   omeprazole (PRILOSEC) 20 MG delayed release capsule Take 40 mg by mouth daily    Historical Provider, MD   aspirin 81 MG tablet Take 81 mg by mouth daily.     Historical Provider, MD   atorvastatin (LIPITOR) 20 MG tablet Take 20 mg by mouth daily. Historical Provider, MD   docusate sodium (COLACE) 100 MG capsule Take 100 mg by mouth 2 times daily. Historical Provider, MD   ferrous sulfate 325 (65 FE) MG tablet Take 325 mg by mouth daily (with breakfast). Historical Provider, MD   hydrochlorothiazide (MICROZIDE) 12.5 MG capsule Take 12.5 mg by mouth daily. Historical Provider, MD   lisinopril (PRINIVIL;ZESTRIL) 5 MG tablet Take 10 mg by mouth daily    Historical Provider, MD   therapeutic multivitamin-minerals (THERAGRAN-M) tablet Take 1 tablet by mouth daily.     Historical Provider, MD   OLANZapine (ZYPREXA) 15 MG tablet Take 15 mg by mouth Daily with supper Takes at 1600    Historical Provider, MD   potassium chloride SA (K-DUR;KLOR-CON M) 10 MEQ tablet Take 10 mEq by mouth 3 times daily     Historical Provider, MD   acetaminophen (TYLENOL) 325 MG tablet Take 650 mg by mouth every 4 hours as needed for Pain or Fever    Historical Provider, MD   diphenhydrAMINE (BENADRYL) 25 MG capsule Take 25 mg by mouth every 6 hours as needed (Cold symptoms)    Historical Provider, MD   magnesium hydroxide (MILK OF MAGNESIA) 400 MG/5ML suspension Take 30 mLs by mouth daily as needed for Constipation    Historical Provider, MD   promethazine (PHENERGAN) 25 MG suppository Place 25 mg rectally every 6 hours as needed for Nausea (or vomiting)    Historical Provider, MD   promethazine (PHENERGAN) 25 MG tablet Take 25 mg by mouth every 6 hours as needed for Nausea    Historical Provider, MD   Dextromethorphan-guaiFENesin  MG/5ML SYRP Take 10 mLs by mouth every 6 hours as needed for Cough    Historical Provider, MD       ALLERGIES:     Risperdal [risperidone], Thorazine [chlorpromazine], and Penicillins      OBJECTIVE:       Vitals:    03/03/23 2100 03/04/23 0008 03/04/23 0517 03/04/23 0645   BP:  116/67     Pulse: 85 82  88   Resp: 20 18     Temp: 98.9 °F (37.2 °C) 98.7 °F (37.1 °C)  97.1 °F (36.2 °C)   TempSrc: Temporal Temporal  Temporal   SpO2: 96% 99%  97%   Weight:   140 lb 3.2 oz (63.6 kg)    Height:             Intake/Output Summary (Last 24 hours) at 3/4/2023 0817  Last data filed at 3/4/2023 0028  Gross per 24 hour   Intake 3637.35 ml   Output 5 ml   Net 3632. 35 ml       PHYSICAL EXAM:  General Appearance  Alert , awake , not in acute distress  HEENT - Head is normocephalic, atraumatic. Lungs - Bilateral equal air entry , no wheezes, rales or rhonchi, aeration good  Cardiovascular - Heart sounds are normal.  Regular rhythm, normal rate without murmur, gallop or rub. Abdomen - Soft, nontender, nondistended, no masses or organomegaly  Neurologic - There are no new focal motor or sensory deficits  Skin - No bruising or bleeding on exposed skin area, dressing c/d/i  Extremities - No cyanosis, clubbing or edema      DIAGNOSTICS:     Laboratory Testing:    See [facility] EMR for lab data    Recent Results (from the past 24 hour(s))   Respiratory Panel, Molecular, with COVID-19 (Restricted: peds pts or suitable admitted adults)    Collection Time: 03/03/23  9:29 AM    Specimen: Nasopharyngeal Swab   Result Value Ref Range    Specimen Description . NASOPHARYNGEAL SWAB     Adenovirus PCR Not Detected Not Detected    Coronavirus 229E PCR Not Detected Not Detected    Coronavirus HKU1 PCR Not Detected Not Detected    Coronavirus NL63 PCR Not Detected Not Detected    Coronavirus OC43 PCR Not Detected Not Detected    SARS-CoV-2, PCR Not Detected Not Detected    Human Metapneumovirus PCR Not Detected Not Detected    Rhino/Enterovirus PCR Not Detected Not Detected    Influenza A by PCR Not Detected Not Detected    Influenza B by PCR Not Detected Not Detected    Parainfluenza 1 PCR Not Detected Not Detected    Parainfluenza 2 PCR Not Detected Not Detected    Parainfluenza 3 PCR Not Detected Not Detected    Parainfluenza 4 PCR Not Detected Not Detected    Resp Syncytial Virus PCR Not Detected Not Detected    Bordetella Parapertussis Not Detected Not Detected    B Pertussis by PCR Not Detected Not Detected    Chlamydia pneumoniae By PCR Not Detected Not Detected    Mycoplasma pneumo by PCR Not Detected Not Detected   Culture, Anaerobic and Aerobic    Collection Time: 03/03/23  4:50 PM    Specimen: Foot; Tissue   Result Value Ref Range    Specimen Description . FOOT RIGHT     Direct Exam NO NEUTROPHILS SEEN     Direct Exam NO ORGANISMS SEEN     Culture PENDING    CBC auto differential    Collection Time: 03/04/23  5:45 AM   Result Value Ref Range    WBC 11.4 (H) 3.5 - 11.3 k/uL    RBC 4.35 3.95 - 5.11 m/uL    Hemoglobin 12.4 11.9 - 15.1 g/dL    Hematocrit 37.8 36.3 - 47.1 %    MCV 86.9 82.6 - 102.9 fL    MCH 28.5 25.2 - 33.5 pg    MCHC 32.8 28.4 - 34.8 g/dL    RDW 15.0 (H) 11.8 - 14.4 %    Platelets 893 197 - 967 k/uL    MPV 10.7 8.1 - 13.5 fL    NRBC Automated 0.0 0.0 per 100 WBC    Seg Neutrophils 87 (H) 36 - 65 %    Lymphocytes 7 (L) 24 - 43 %    Monocytes 6 3 - 12 %    Eosinophils % 0 (L) 1 - 4 %    Basophils 0 0 - 2 %    Immature Granulocytes 0 0 %    Segs Absolute 9.85 (H) 1.50 - 8.10 k/uL    Absolute Lymph # 0.82 (L) 1.10 - 3.70 k/uL    Absolute Mono # 0.64 0.10 - 1.20 k/uL    Absolute Eos # <0.03 0.00 - 0.44 k/uL    Basophils Absolute <0.03 0.00 - 0.20 k/uL    Absolute Immature Granulocyte 0.03 0.00 - 0.30 k/uL   Comprehensive Metabolic Panel w/ Reflex to MG    Collection Time: 03/04/23  5:45 AM   Result Value Ref Range    Glucose 120 (H) 70 - 99 mg/dL    BUN 17 8 - 23 mg/dL    Creatinine 0.62 0.50 - 0.90 mg/dL    Est, Glom Filt Rate >60 >60 mL/min/1.73m2    Bun/Cre Ratio 27 (H) 9 - 20    Calcium 8.5 (L) 8.6 - 10.4 mg/dL    Sodium 137 135 - 144 mmol/L    Potassium 3.8 3.7 - 5.3 mmol/L    Chloride 108 (H) 98 - 107 mmol/L    CO2 21 20 - 31 mmol/L    Anion Gap 8 (L) 9 - 17 mmol/L    Alkaline Phosphatase 84 35 - 104 U/L    ALT 13 5 - 33 U/L    AST 18 <32 U/L    Total Bilirubin 0.2 (L) 0.3 - 1.2 mg/dL    Total Protein 5.6 (L) 6.4 - 8.3 g/dL    Albumin 3.0 (L) 3.5 - 5.2 g/dL    Albumin/Globulin Ratio 1.2 1.0 - 2.5           Current Facility-Administered Medications   Medication Dose Route Frequency Provider Last Rate Last Admin    sodium chloride flush 0.9 % injection 5-40 mL  5-40 mL IntraVENous 2 times per day Homer Branden, DPM        sodium chloride flush 0.9 % injection 5-40 mL  5-40 mL IntraVENous PRN Homer Branden, DPM        0.9 % sodium chloride infusion   IntraVENous PRN Homer Branden, DPM        enoxaparin (LOVENOX) injection 40 mg  40 mg SubCUTAneous Daily Homer Branden, DPM   40 mg at 03/03/23 1059    acetaminophen (TYLENOL) tablet 650 mg  650 mg Oral Q6H PRN Homer Branden, DPM        Or    acetaminophen (TYLENOL) suppository 650 mg  650 mg Rectal Q6H PRN Homer Branden, DPM        0.9 % sodium chloride infusion   IntraVENous Continuous Homer Branden,  mL/hr at 03/04/23 0028 New Bag at 03/04/23 0028    LORazepam (ATIVAN) injection 0.5 mg  0.5 mg IntraVENous Q6H PRN Homer Branden, DPM   0.5 mg at 03/04/23 0235    aspirin EC tablet 81 mg  81 mg Oral Daily Homer Branden, DPM        atorvastatin (LIPITOR) tablet 20 mg  20 mg Oral Daily Homer Branden, DPM        benzonatate (TESSALON) capsule 200 mg  200 mg Oral TID PRN Homer Branden, DPM        Vitamin D (CHOLECALCIFEROL) tablet 2,000 Units  2,000 Units Oral Daily Homer Branden, DPM        guaiFENesin-dextromethorphan (ROBITUSSIN DM) 100-10 MG/5ML syrup 10 mL  10 mL Oral Q6H PRN Homer Branden, DPM        diphenhydrAMINE (BENADRYL) capsule 25 mg  25 mg Oral Q6H PRN Homer Branden, DPM        docusate sodium (COLACE) capsule 100 mg  100 mg Oral BID Homer Branden, DPM        ferrous sulfate (IRON 325) tablet 325 mg  325 mg Oral Daily with breakfast Homer Branden, DPM        hydroCHLOROthiazide (MICROZIDE) capsule 12.5 mg  12.5 mg Oral Daily Homer Otero DPM        lisinopril (PRINIVIL;ZESTRIL) tablet 10 mg  10 mg Oral Daily Homer Otero DPM        cetirizine (ZYRTEC) tablet 10 mg  10 mg Oral Daily Chris Gabriella Goodell, DPM        magnesium hydroxide (MILK OF MAGNESIA) 400 MG/5ML suspension 30 mL  30 mL Oral Daily PRN Ever Vernatone, DPM        [Held by provider] meloxicam (MOBIC) tablet 7.5 mg  7.5 mg Oral Daily Ever Vernatone, DPM        OLANZapine (ZYPREXA) tablet 15 mg  15 mg Oral Dinner Ever Jennifer, DPM        pantoprazole (PROTONIX) tablet 40 mg  40 mg Oral QAM AC Chris Fierro DPM        potassium chloride (KLOR-CON M) extended release tablet 10 mEq  10 mEq Oral TID Ever Jennifer, DPM        promethazine (PHENERGAN) suppository 25 mg  25 mg Rectal Q6H PRN Ever Vernatone, DPM        promethazine (PHENERGAN) tablet 25 mg  25 mg Oral Q6H PRN Ever Jennifer, DPM        therapeutic multivitamin-minerals 1 tablet  1 tablet Oral Daily Chris Fierro DPM        psyllium (METAMUCIL) 58.12 % packet 1 packet  1 packet Oral BID  Chris Barcenas DPM        traMADol (ULTRAM) tablet 50 mg  50 mg Oral Q6H PRN Ever Jennifer, DPM        ipratropium-albuterol (DUONEB) nebulizer solution 1 ampule  1 ampule Inhalation 4x daily Ever Jennifer, DPM   1 ampule at 03/04/23 3501    ketorolac (TORADOL) injection 15 mg  15 mg IntraVENous Q6H PRN Ever Jennifer, DPM        meropenem (MERREM) 1,000 mg in sodium chloride 0.9 % 100 mL IVPB (mini-bag)  1,000 mg IntraVENous Q8H Humera Chacon MD   Stopped at 03/04/23 0515       ASSESSMENT:     Principal Problem:    Sepsis ([facility] [address] [facility]  Active Problems:    Hypertension    Intellectual disability    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HCC)    Tardive dyskinesia    Cellulitis of right lower extremity  Resolved Problems:    * No resolved hospital problems.  *      PLAN:     Primary Problem(s): Sepsis ([address]  Condition is stable  Treatment plan: Continue current treatment  Imaging: no further imaging studies ordered today  Medications: Continue current antibiotic: Meropenem/Vanc, pending cultures, ID recommendations  and Podiatry  Podiatry/ is following closely  Pain control as needed  Continue w/ prior home meds  Monitor labs  Dispo Pending clinical status/ TDC       DVT prophylaxis: Lovenox 40 mg SC  GI prophylaxis: Protonix 40 mg daily    Above plan discussed with the patient who agreed to the above plan     Discussed care plan with nurse after getting their input. Please note that this chart was generated using voice recognition Dragon dictation software. Although every effort was made to ensure the accuracy of this automated transcription, some errors in transcription may have occurred.     Taryn Dangelo MD  3/4/2023 8:17 AM

## 2023-03-04 NOTE — PROGRESS NOTES
Pt alert and oriented to self only, pt has incomprehensible speech. Vitals and assessment completed as charted. Pt agitated and pulling at IV. Call light is within reach, bed alarm on. Will continue to monitor.

## 2023-03-04 NOTE — PROGRESS NOTES
[facility] -Progress Note- Telemedicine  Today's Date and Time: 3/4/2023, 6:56 AM    Impression :   RLE cellulitis. Appearance suggestive of Strep cellulitis  Foot cellulitis  S/P I&D on 3-3-23 with findings of phlegmon, no abscess  Fever  Leukocytosis  HTN  MRDD  Schizophrenia  Tardive dyskinesia  Frequent falls  Osteoporosis  Kyphosis  PCN allergy    Recommendations: On Meropenem 1 gm IV q 8 hr pending cultures  Should be able to revert to Cefazolin once culture data are available. Medical Decision Making/Summary/Discussion:3/4/2023       Infection Control Recommendations   [place] Precautions    Antimicrobial Stewardship Recommendations     Simplification of therapy  Targeted therapy  Coordination of Outpatient Care:   Estimated Length of IV antimicrobials:TBD  Patient will need Midline Catheter Insertion: No  Patient will need PICC line Insertion:No  Patient will need: Home IV , [place],  SNF,  LTAC: TBD  Patient will need outpatient wound care:no    Chief complaint/reason for consultation:   sepsis      History of Present Illness:   Cholo Navarrete is a 71y.o.-year-old female who was initially admitted on 3/2/2023. Patient seen at the request of Dr. Yolis Mccartney:    This patient, with MRDD, presented from her group home with fever x 1 day of 40.1 Celsius. She has not experienced N/V/D and her vital signs are stable on room air. She is mostly non-verbal.    Isabel Skipper is mostly unremarkable other than a WBC of 18.1. Viral respiratory panel was negative. Blood cultures have yielded no growth and urinalysis is not indicative of a UTI. She has a wound on her right ankle with surrounding redness and warmth to her RLE. A CT showed mild pulmonary vascular congestion with mild, dependant atelectatic changes in the posterior lung bases. Multiple hypodense lesions were noted on the liver.   The pancrease is markedly atrophic without acute process. Findings in the bowel showing probable enteritis and marked sigmoid diverticulosis without diverticulitis. The patient was given a dose of flagyl and Cipro, and was initiated on clindamycin. She does have a penicillin allergy. CT chest/abd/pelvis 3/2/23  Impression   Mild dependent atelectatic changes at the posterior lung bases bilaterally. Otherwise no confluent pneumonia or pulmonary atelectasis. No pleural   effusion or pneumothorax. Minimal pulmonary vascular congestion. Probable   mild interstitial pulmonary edema in lungs. No acute process in the mediastinum. Normal thoracic aorta. No evidence of   pulmonary embolism in the visualized central pulmonary arteries, central   branches and paracentral branches of pulmonary arteries. There is small to moderate retrocardiac hiatal hernia. There are multiple hypodense nonenhancing lesions scattered in liver most   likely to be multiple hepatic cysts. No diagnostic finding in gallbladder, spleen, and adrenal glands. Moderate   to markedly atrophic pancreas without any acute process. No renal stone or   obstructive uropathy. Normal appendix visualized. Small amount of increased fluid and small amount of gas scattered in small   bowel with few fluid levels. Probable acute enteritis. Moderate amount of stool present throughout most of colon. Moderate to   marked sigmoid diverticulosis, without diverticulitis. All mesenteric arteries and major branches are patent and opacified. Marked lumbar dextroscoliosis with moderate to marked multilevel spondylotic   changes. Moderate compression of upper aspect of L2 vertebral body, probably   chronic.              ID was consulted for antibiotic recommendations    CURRENT EVALUATION 3/4/2023  /67   Pulse 88   Temp 97.1 °F (36.2 °C) (Temporal)   Resp 18   Ht 5' (1.524 m)   Wt 140 lb 3.2 oz (63.6 kg)   SpO2 97%   BMI 27.38 kg/m²       Patient underwent foot I&D on 3-3-23    Afebrile  VS stable. BP controlled    Patient stable   No complaints  No new issues per RN    Appearance of the border of cellulitis suggests Streptococcal cellulitis. .Medications reviewed:  Cefazolin was switched to Meropenem       Labs, X rays reviewed: 3/4/2023    BUN:21-->17  Cr:0.69-->0.62    WBC:18.7-->11.4  Hb:15.3-->12.4  Plat: 257-->164    CRP:Pending    Cultures:  Urine:  3/2/23: No UTI  Blood:  3/2/23: No growth thus far  Sputum :    Wound:  3-3-23: No bacteria by Gram stain. Culture pending. MRSA Nares:  3/3/23: negative      Imaging:          RLE 3/2/23          CT chest 3/2/23      CXR 3/2/23          Discussed with PATRICK RN. I have personally reviewed the past medical history, past surgical history, medications, social history, and family history, and I have updated the database accordingly. 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  soft tissue mass    FOOT SURGERY Left 02/20/2015    FOOT SURGERY N/A 06/21/2013    removal of verruca    FOOT SURGERY Right 10/01/2012    surgical removal of warts    INCISION AND DRAINAGE FOOT  03/03/2023    Dr. Kimberli Mcgrath Right 01/11/2011    OTHER SURGICAL HISTORY Left 10/16/2015    Nail bed excision     POLYPECTOMY  01/01/1995    vocal cord       Medications:      sodium chloride flush 5-40 mL IntraVENous 2 times per day    enoxaparin  40 mg SubCUTAneous Daily    aspirin  81 mg Oral Daily    atorvastatin  20 mg Oral Daily    Vitamin D  2,000 Units Oral Daily    docusate sodium  100 mg Oral BID    ferrous sulfate  325 mg Oral Daily with breakfast    hydroCHLOROthiazide  12.5 mg Oral Daily    lisinopril  10 mg Oral Daily    cetirizine  10 mg Oral Daily    [Held by provider] meloxicam  7.5 mg Oral Daily    OLANZapine  15 mg Oral Dinner    pantoprazole  40 mg Oral QAM AC    potassium chloride  10 mEq Oral TID    therapeutic multivitamin-minerals  1 tablet Oral Daily    psyllium  1 packet Oral BID WC    ipratropium-albuterol  1 ampule Inhalation 4x daily    meropenem  1,000 mg IntraVENous Q8H       Social History:     Social History     Socioeconomic History    Marital status: Single     Spouse name: Not on file    Number of children: Not on file    Years of education: Not on file    Highest education level: Not on file   Occupational History    Not on file   Tobacco Use    Smoking status: Never    Smokeless tobacco: Not on file   Substance and Sexual Activity    Alcohol use: No    Drug use: Not on file    Sexual activity: Not on file   Other Topics Concern    Not on file   Social History Narrative    Not on file     Social Determinants of Health     Financial Resource Strain: Not on file   Food Insecurity: Not on file   Transportation Needs: Not on file   Physical Activity: Not on file   Stress: Not on file   Social Connections: Not on file   Intimate Partner Violence: Not on file   Housing Stability: Not on file       Family History:   History reviewed. No pertinent family history. Allergies:   Risperdal [risperidone], Thorazine [chlorpromazine], and Penicillins     Review of Systems:   TANISHA  Constitutional: No fevers or chills. No systemic complaints  Head: No headaches  Eyes: No double vision or blurry vision. No conjunctival inflammation. ENT: No sore throat or runny nose. . No hearing loss, tinnitus or vertigo. Cardiovascular: No chest pain or palpitations. No shortness of breath. No COYNE  Lung: No shortness of breath or cough. No sputum production  Abdomen: No nausea, vomiting, diarrhea, or abdominal pain. Leila Gip No cramps. Genitourinary: No increased urinary frequency, or dysuria. No hematuria. No suprapubic or CVA pain  Musculoskeletal: No muscle aches or pains. No joint effusions, swelling or deformities  Hematologic: No bleeding or bruising. Neurologic: No headache, weakness, numbness, or tingling. Integument: No rash, no ulcers. Psychiatric: No depression. Endocrine: No polyuria, no polydipsia, no polyphagia. Physical Examination :   Patient Vitals for the past 8 hrs:   BP Temp Temp src Pulse Resp SpO2 Weight   03/04/23 0645 -- 97.1 °F (36.2 °C) Temporal 88 -- 97 % --   03/04/23 0517 -- -- -- -- -- -- 140 lb 3.2 oz (63.6 kg)   03/04/23 0008 116/67 98.7 °F (37.1 °C) Temporal 82 18 99 % --       General Appearance: Awake, alert, non-verbal, MRDD hx  Head:  Normocephalic, no trauma  Eyes: Pupils equal, round, reactive to light and accommodation; extraocular movements intact; sclera anicteric; conjunctivae pink. No embolic phenomena. ENT: Oropharynx clear, without erythema, exudate, or thrush. No tenderness of sinuses. Mouth/throat: mucosa pink and moist. No lesions. Dentition in good repair. Neck:Supple, without lymphadenopathy. Thyroid normal, No bruits. Pulmonary/Chest: Diminished to auscultation, without wheezes, rales, or rhonchi. No dullness to percussion. Cardiovascular: Regular rate and rhythm without murmurs, rubs, or gallops. Abdomen: Soft, non tender. Bowel sounds normal. No organomegaly  All four Extremities: RLE ankle scab with surrounding erythema  Neurologic:non-verbal, MRDD hx  Skin: Warm and dry with good turgor. No signs of peripheral arterial or venous insufficiency. RLE ankle scab with surrounding erythema going up the leg. Circinate border of erythema.     Medical Decision Making -Laboratory:   I have independently reviewed/ordered the following labs:    CBC with Differential:   Recent Labs     03/02/23  2100 03/04/23  0545   WBC 18.7* 11.4*   HGB 15.3* 12.4   HCT 46.2 37.8    164   LYMPHOPCT 5* 7*   MONOPCT 3 6       BMP:   Recent Labs     03/03/23  0540 03/04/23  0545    137   K 3.8 3.8    108*   CO2 21 21   BUN 21 17   CREATININE 0.69 0.62       Hepatic Function Panel:   Recent Labs     03/03/23  0540 03/04/23  0545   PROT 6.2* 5.6*   LABALBU 3.3* 3.0*   BILITOT 0.4 0.2*   ALKPHOS 97 84   ALT 14 13   AST 15 18       No results for input(s): RPR in the last 72 hours. No results for input(s): HIV in the last 72 hours. No results for input(s): BC in the last 72 hours. Lab Results   Component Value Date/Time    MUCUS 1+ 03/02/2023 10:08 PM    RBC 4.35 03/04/2023 05:45 AM    RBC 4.98 01/10/2012 06:10 AM    TRICHOMONAS NOT REPORTED 01/15/2019 06:35 PM    WBC 11.4 03/04/2023 05:45 AM    YEAST NOT REPORTED 01/15/2019 06:35 PM    TURBIDITY Clear 03/02/2023 10:08 PM     Lab Results   Component Value Date/Time    CREATININE 0.62 03/04/2023 05:45 AM    GLUCOSE 120 03/04/2023 05:45 AM    GLUCOSE 86 01/10/2012 06:10 AM       Medical Decision Making-Imaging:     Narrative   EXAMINATION:   CT OF THE CHEST, ABDOMEN, AND PELVIS WITH CONTRAST 3/2/2023 9:54 pm       TECHNIQUE:   CT of the chest, abdomen and pelvis was performed with the administration of   intravenous contrast. Multiplanar reformatted images are provided for review. Automated exposure control, iterative reconstruction, and/or weight based   adjustment of the mA/kV was utilized to reduce the radiation dose to as low   as reasonably achievable.        COMPARISON:   None       HISTORY:   ORDERING SYSTEM PROVIDED HISTORY: Fever, leukocytosis, nonverbal   TECHNOLOGIST PROVIDED HISTORY:   Fever, leukocytosis, nonverbal       Decision Support Exception - unselect if not a suspected or confirmed   emergency medical condition->Emergency Medical Condition (MA)       FINDINGS:       Chest:       Mediastinum: No evidence of mass or pathologic lymphadenopathy or acute   process in the mediastinum. Thoracic aorta is of normal size, without evidence of aneurysm or dissection. In the opacified central pulmonary arteries and their central and paracentral   branches there is no definable pulmonary embolism. No evidence of pericardial effusion or pericardial thickening. Evidence of   coronary artery calcification. Lungs/pleura: Evidence of mild streaky atelectatic changes, with or without   subtle infiltrates in the right pulmonary lower lobe. In the rest of both   lungs, there is no other focal infiltrates or atelectatic changes. There is   mild pulmonary vascular congestion. The interstitial markings are hazy and   therefore interstitial pulmonary edema is not excluded. No evidence of   pneumothorax. Soft Tissues/Bones: In the thoracic spine, evidence of moderate kyphosis and   moderate multilevel degenerative disc disease without definable fracture. Sternum is intact. In visualized bilateral ribs there is no definable   fracture. Abdomen/Pelvis:       Organs: No evidence of pneumoperitoneum. There is small to moderate size   retrocardiac hiatal hernia. In the lower lateral portion of right hepatic lobe, there is hypodense   nonenhancing lesion measuring 2.3 cm in diameter. In the upper most portion   of right hepatic lobe there is a hypodense nonenhancing lesion measuring 1.6   cm in diameter. In the upper portion of right hepatic lobe there is another   hypodense nonenhancing lesion measuring 0.8 cm. At the inferior aspect of   right hepatic lobe there is a tiny nonenhancing lesion measuring 0.55 cm. At   the inferior aspect of left medial segment of liver there is a tiny   nonenhancing lesion measuring 0.4 cm.   In the midportion right hepatic lobe   there is nonenhancing lesion measuring 0.45 cm. These multiple nonenhancing   hepatic lesions are most likely to be hepatic cysts. Normal size of spleen with a few tiny calcified granulomas. Normal bilateral adrenal glands. Moderate to markedly atrophic pancreas without focal abnormality and without   any acute process. In the right and left kidney there are a few tiny subcentimeter cortical   cysts without any other mass. No evidence of stone or hydronephrosis in   either kidney. There is no obstructive uropathy. Bladder is mildly distended without evidence of stone or focal abnormality. GI/Bowel: No diagnostic finding in visualized stomach. Small to moderate   amount of gas scattered in proximal and mid small bowel and minimal gas   scattered in distal small bowel. Mild increased fluid contents in the distal   small bowel. There are a few small fluid levels in small bowel. No obvious   small bowel wall thickening. Normal appendix posteroinferior to cecum. Moderate amount of stool and small amount of gas are present in the right   colon and transverse colon. Descending colon contains small amount of stool   and gas. In the sigmoid colon small amount of stool and gas are scattered. In rectum moderate amount of stool and gas are present. No perirectal abnormality. Evidence of moderate to marked diverticulosis of sigmoid colon without   evidence of diverticulitis. No evidence of colitis. Pelvis: No abnormal fluid collection or focal inflammatory process in the   pelvis. No pelvic mass or pathologic lymphadenopathy. Peritoneum/Retroperitoneum: Abdominal aorta is of normal size with mild   calcified plaques, without aortic dissection. No evidence of periaortic or   mesenteric pathologic lymphadenopathy. Normally opacified patent celiac artery and branches and SMA and branches. GA is also patent.   Bilateral renal arteries are grossly normally patent. No evidence of ascites. Normally patent and opacified portal vein, the splenic vein and the SMV. Bones/Soft Tissues: Evidence of severe dextrorotatory scoliosis in lumbar   spine with multilevel moderate to severe degenerative disc disease. At L5-S1   level there is grade 2 spondylolisthesis with bilateral pars defects and   advanced DDD and facet osteoarthritis. At L1-L2 level moderate degenerative disc disease with moderate compression   of upper aspect of L2 vertebral body, probably chronic. No evidence of   inguinal, femoral or ventral hernia. Evidence of previous internal fixation of fracture in the proximal right   femur. Impression   Mild dependent atelectatic changes at the posterior lung bases bilaterally. Otherwise no confluent pneumonia or pulmonary atelectasis. No pleural   effusion or pneumothorax. Minimal pulmonary vascular congestion. Probable   mild interstitial pulmonary edema in lungs. No acute process in the mediastinum. Normal thoracic aorta. No evidence of   pulmonary embolism in the visualized central pulmonary arteries, central   branches and paracentral branches of pulmonary arteries. There is small to moderate retrocardiac hiatal hernia. There are multiple hypodense nonenhancing lesions scattered in liver most   likely to be multiple hepatic cysts. No diagnostic finding in gallbladder, spleen, and adrenal glands. Moderate   to markedly atrophic pancreas without any acute process. No renal stone or   obstructive uropathy. Normal appendix visualized. Small amount of increased fluid and small amount of gas scattered in small   bowel with few fluid levels. Probable acute enteritis. Moderate amount of stool present throughout most of colon. Moderate to   marked sigmoid diverticulosis, without diverticulitis. All mesenteric arteries and major branches are patent and opacified. Marked lumbar dextroscoliosis with moderate to marked multilevel spondylotic   changes. Moderate compression of upper aspect of L2 vertebral body, probably   chronic. Narrative   EXAMINATION:   ONE XRAY VIEW OF THE CHEST       3/2/2023 9:13 pm       COMPARISON:   None. HISTORY:   ORDERING SYSTEM PROVIDED HISTORY: fever   TECHNOLOGIST PROVIDED HISTORY:   fever       FINDINGS:   Chest radiograph: The cardiomediastinal silhouette and hilar contours are   prominent with congestion of bilateral mary. Increased interstitial markings   likely suggestive of mild pulmonary edema. The lungs are otherwise clear   with no focal consolidation, pleural effusion or pneumothorax. The overlying   soft tissue and osseous structures do not demonstrate acute abnormality. Unchanged chronic fracture deformities of midthoracic vertebral bodies. Impression       Increased interstitial markings likely suggestive of mild pulmonary edema. Medical Decision [id number]:       Medical Decision Making-Other:     Note:  Labs, medications, radiologic studies were reviewed with personal review of films  Large amounts of data were reviewed  Discussed with nursing Staff, Discharge planner  Infection Control and Prevention measures reviewed  All prior entries were reviewed  Administer medications as ordered  Prognosis: [address]  Discharge planning reviewed    Thank you for allowing us to participate in the care of this patient. Please call with questions.     Anayeli Kirby MD      Pager: [phone] - Office: [phone]

## 2023-03-04 NOTE — CONSULTS
Vancomycin Dosing by Pharmacy - Initial Note   TODAY'S DATE:  3/4/2023  Patient name, age:  Wilma Boudreaux, 71 y.o. Indication: SSTI, MRSA suspected. Additional antimicrobials:  meropenem    Allergies:  Risperdal [risperidone], Thorazine [chlorpromazine], and Penicillins   Actual Weight:    Wt Readings from Last 1 Encounters:   03/04/23 140 lb 3.2 oz (63.6 kg)     Labs/Ancillary Data  Estimated Creatinine Clearance: 69 mL/min (based on SCr of 0.62 mg/dL). Recent Labs     03/02/23  2100 03/03/23  0540 03/04/23  0545   CREATININE 0.89 0.69 0.62   BUN 29* 21 17   WBC 18.7*  --  11.4*     No results found for: PROCAL    Intake/Output Summary (Last 24 hours) at 3/4/2023 0836  Last data filed at 3/4/2023 0028  Gross per 24 hour   Intake 3637.35 ml   Output 5 ml   Net 3632. 35 ml     Temp: 98.7    Recent vancomycin administrations                     vancomycin (VANCOCIN) 750 mg in sodium chloride 0.9 % 250 mL IVPB (mg) 750 mg New Bag 03/03/23 1149                  Culture Date / [place]  /  Results  3/3/23              nasal         in process  3/3/23              wound       pending  3/2/23              blood x2    no growth 2 days    MRSA Nasal Swab: was ordered by provider, awaiting results. Trula Blew PLAN   IPatient received vancomycin 750mg IV once prior to surgery on 3/3/23  Will initiate vancomycin 750mg IV every 12 hours to begin at 0900 3/4/23  Patient has history of MRSA, currently also receiving meropenem  Ensured BUN/sCr ordered at baseline and every 48 hours x at least 3 levels, then at least weekly. Vancomycin level ordered for 3/6/23 @ 0600. Will use bayesian method for dosing. This level will not be a trough. Target AUC/TEMI: 400-600.       Vancomycin Target Concentration Parameters  Treatment  Population Target AUC/TEMI Target Trough   Invasive MRSA Infection (bacteremia, pneumonia, meningitis, endocarditis, osteomyelitis)  Sepsis (undifferentiated) 400-600 N/A   Infection due to non-MRSA pathogen  Empiric treatment of non-invasive MRSA infection  (SSTI, UTI) <500 10-15 mg/L   CrCl < 29 mL/min  Rapidly fluctuating serum creatinine   MONSERRAT N/A < 15 mg/L     Renal replacement therapy is dosed by levels, per hospital protocol.  Abbreviations  * Pauc: probability that AUC is >400 (efficacy); Pconc: probability that Ctrough is above 20 ?g/mL (toxicity); Tox: Probability of nephrotoxicity, based on Shiloh et al. Clin Infect Dis 2009.    Thank you for the consult.  Pharmacy will continue to follow.  Marleni Velasco, ROMEO.Ph., 3/4/2023,8:39 AM

## 2023-03-04 NOTE — RT PROTOCOL NOTE
RESPIRATORY ASSESSMENT PROTOCOL                                                                                              Patient Name: Garrett Mcardle Room#: 1203/2511-14 : 1950     Admitting diagnosis: Dehydration [E86.0]  Gastroenteritis [K52.9]  Sepsis ([facility] [A41.9]  Sepsis, due to unspecified organism, unspecified whether acute organ dysfunction present [facility]) [A41.9]       Medical History:   Past Medical History:   Diagnosis Date    Arthritis     Cellulitis of right lower extremity 3/3/2023    Falls frequently     2011    Femur fracture ([facility]     Fibrocystic breast     Hyperlipidemia     Hypertension     Kyphosis deformity of spine     Mental retardation     moderate    Osteoporosis     Pelvis fracture ([facility]     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[facility]     disorganized type    Scoliosis deformity of spine     Tardive dyskinesia        PATIENT ASSESSMENT    LABORATORY DATA  Hematology:   Lab Results   Component Value Date/Time    WBC 11.4 2023 05:45 AM    RBC 4.35 2023 05:45 AM    RBC 4.98 01/10/2012 06:10 AM    HGB 12.4 2023 05:45 AM    HCT 37.8 2023 05:45 AM     2023 05:45 AM     01/10/2012 06:10 AM     Chemistry:  No results found for: PHART, DEC8TCP, PO2ART, V9UUBFDO, MRB8ZAI, PBEA    VITALS  Heart Rate: 91   Resp: 20  BP: (!) 103/53  SpO2: 97 % O2 Device: None (Room air)  Temp: 97.6 °F (36.4 °C)    SKIN COLOR  [] Normal  [] Pale  [] Dusky  [] Cyanotic    RESPIRATORY PATTERN  [] Normal  [] Dyspnea  [] Cheyne-Baer  [] Kussmaul  [] Biots    AMBULATORY  [] Yes  [] No  [] With Assistance     Pt is MRDD and is combative tolerating only Blowby Neb txs. .. Pt will not tolerate EZPAP    Patient Acuity 0 1 2 3 4 Score   Level of Consciousness (LOC) [x]  Alert & Oriented or Pt normal LOC []  Confused;follows directions []  Confused & uncooper-ative []  Obtunded []  Comatose 0   Respiratory Rate  (RR) []  Reg. rate & pattern. 12 - 20 bpm  []  Increased RR.  Greater than 20 bpm   [x]  SOB w/ exertion or RR greater than 24 bpm []  Access- ory muscle use at rest. Abn.  resp. []  SOB at rest.   2   Bilateral Breath Sounds (BBS) []  Clear []  Diminish-ed bases  [x]  Diminish-ed t/o, or rales   []  Sporadic, scattered wheezes or rhonchi []  Persistentwheezes and, or absent BBS 2   Cough []  Strong, effective, & non-prod. [x]  Effective & prod. Less than 25 ml (2 TBSP) over past 24 hrs []  Ineffective & non-prod to less than 25 ML over past 24 hrs []  Ineffective and, or greater than 25 ml sputum prod. past 24 hrs. []  Nonspon- taneous; Requires suctioning 1   Pulmonary History  (PULM HX) [x]  No smoking and no chronic pulmonary history []  Former smoker. Quit over 12 mos. ago []  Current smoker or quit w/ in 12 mos []  Pulm. History and, or 20 pk/yr smoking hx []  Admitted w/ acute pulm. dx and, or has been admitted w/ pulm. dx 2 or more times over past 12 mos 0   Surgical History this Admit  (SURG HX) [x]  No surgery []  General surgery []  Lower abdominal []  Thoracic or upper abdominal   []  Thoracic w/ pulm. disease 0   Chest X-Ray (CXR)/CT Scan []  Clear or not applicable []  Not available [x]  Atelectasis or pleural effusions []  Localized infiltrate or pulm. edema []  Con-solidated Infiltrates, bilateral, or in more than 1 lobe 2   TOTAL ACUITY: 7       CARE PLAN    If Acuity Level is 2, 3, or 4 in any of the following:    [x] BILATERAL BREATH SOUNDS (BBS)     [] PULMONARY HISTORY (PULM HX)  [x] Respiratory Rate  (RR)    Goal: Improve respiratory functions in patients with airway disease and decrease WOB    [x] AEROSOL PROTOCOL    Total Acuity:   14-28  []  Secondary Assessment in 24 hrs Total Acuity:  9-13  []  Secondary Assessment in 24 hrs Total Acuity:  4-8  [x]  Secondary Assessment in 24 hrs Total Acuity:  0-3  []  Secondary Assessment in 48 hrs   HHN AEROSOL THERAPY with  [physician-ordered bronchodilator(s)] q 4 & Albuterol PRN q2 hrs.    Breath-Actuated Neb if BBS Acuity = 4, and pt. can use MP. Notify physician if condition deteriorates. HHN AEROSOL THERAPY with  [physician-ordered bronchodilator(s)]  QID and Albuterol PRN q4 hrs. Breath-Actuated Neb if BBS Acuity = 4, and pt. can use MP. Notify physician if condition deteriorates. MDI THERAPY with  2 actuations of [physician-ordered bronchodilator(s)] via spacer TID Albuterol and PRN q4 hrs. If unable to utilize MDI: HHN [physician-ordered bronchodilator(s)] TID and Albuterol PRN q4 hrs. Notify physician if condition deteriorates. MDI THERAPY with  [physician-ordered bronchodilator(s)] via spacer TID PRN. If unable to utilize MDI: HHN [physician-ordered bronchodilator(s)] TID PRN. Notify physician if condition deteriorates. If Acuity Level is 2, 3, or 4 in any of the following:    [] COUGH     [] SURGICAL HISTORY (SURG HX)  [x] CHEST XRAY (CXR)    Goal: Improvement in sputum mobilization in patients with ineffective airway clearance. Reverse atelectasis. [x] Bronchopulmonary Hygiene Protocol      Total Acuity:   14-28  []  Secondary Assessment in 24 hrs Total Acuity:  9-13  []  Secondary Assessment in 24 hrs Total Acuity:  4-8  [x]  Secondary Assessment in 24 hrs Total Acuity:  0-3  []  Secondary Assessment in 48 hrs   METANEB QID with [physician-ordered bronchodilator(s)] if CXR Acuity = 4; otherwise:  PD&P, Oscillatory Therapy, or Vest QID & PRN AND PEP QID & PRN  NT Sxn PRN for ineffective cough  METANEB QID with [physician-ordered bronchodilator(s)] if CXR Acuity = 4; otherwise:  PD&P, Oscillatory Therapy or Vest QID & PRN AND PEP QID & PRN  NT Sxn PRN for ineffective cough  PD&P, Oscillatory Therapy, or Vest TID & PRN AND PEP TID & PRN   Instruct patient to self-perform IS q1hr WA       If Acuity Level is 2 or above in the following:    [] PULMONARY HISTORY (PULM HX)    Goal: Assist patient in quitting smoking to slow or stop the progression of lung disease.     [] Smoking Cessation Protocol    SMOKING CESSATION EDUCATION provided according to policy [id number]: (leonor with an X)  ____Yes    ____ No     ____ NA    Smoking Cessation Booklet given:  ____Yes  ____No ____Patient Beba Neighbours

## 2023-03-04 NOTE — PROGRESS NOTES
Pt agitated, pulled out IV and continues to attempt to get out of bed. New IV inserted and PRN ativan given, see MAR. Bed alarm on, will continue to monitor.

## 2023-03-04 NOTE — PROGRESS NOTES
Current Facility-Administered Medications   Medication Dose Route Frequency Provider Last Rate Last Admin    sodium chloride flush 0.9 % injection 5-40 mL  5-40 mL IntraVENous 2 times per day Flaquito Ngo, NHAN        sodium chloride flush 0.9 % injection 5-40 mL  5-40 mL IntraVENous PRN Flaquito Ngo, KIRITM        0.9 % sodium chloride infusion   IntraVENous PRN Flaquito Ngo, KIRITM        enoxaparin (LOVENOX) injection 40 mg  40 mg SubCUTAneous Daily KIRIT العليM   40 mg at 03/03/23 1059    acetaminophen (TYLENOL) tablet 650 mg  650 mg Oral Q6H PRN Flaquito Ngo DPM        Or    acetaminophen (TYLENOL) suppository 650 mg  650 mg Rectal Q6H PRN Flaquito Ngo DPM        0.9 % sodium chloride infusion   IntraVENous Continuous KIRIT العليM 150 mL/hr at 03/04/23 0028 New Bag at 03/04/23 0028    LORazepam (ATIVAN) injection 0.5 mg  0.5 mg IntraVENous Q6H PRN KIRIT العليM   0.5 mg at 03/04/23 0235    aspirin EC tablet 81 mg  81 mg Oral Daily Flaquito Ngo DPM        atorvastatin (LIPITOR) tablet 20 mg  20 mg Oral Daily Flaquito Ngo DPM        benzonatate (TESSALON) capsule 200 mg  200 mg Oral TID PRN Flaquito Ngo DPM        Vitamin D (CHOLECALCIFEROL) tablet 2,000 Units  2,000 Units Oral Daily KIRIT العليM        guaiFENesin-dextromethorphan (ROBITUSSIN DM) 100-10 MG/5ML syrup 10 mL  10 mL Oral Q6H PRN Flaquito Ngo DPM        diphenhydrAMINE (BENADRYL) capsule 25 mg  25 mg Oral Q6H PRN Flaquito Ngo DPM        docusate sodium (COLACE) capsule 100 mg  100 mg Oral BID Flaquito Ngo DPM        ferrous sulfate (IRON 325) tablet 325 mg  325 mg Oral Daily with breakfast Flaquito Ngo DPM        hydroCHLOROthiazide (MICROZIDE) capsule 12.5 mg  12.5 mg Oral Daily Flaquito Ngo DPM        lisinopril (PRINIVIL;ZESTRIL) tablet 10 mg  10 mg Oral Daily Russella Ngo, DPM        cetirizine (ZYRTEC) tablet 10 mg  10 mg Oral Daily Chris Garcia, DPM        magnesium hydroxide (MILK OF MAGNESIA) 400 MG/5ML suspension 30 mL  30 mL Oral Daily PRN Sakina Vance, DPM        [Held by provider] meloxicam (MOBIC) tablet 7.5 mg  7.5 mg Oral Daily Sakina Vance, DPM        OLANZapine (ZYPREXA) tablet 15 mg  15 mg Oral Dinner Sakina Vance, DPM        pantoprazole (PROTONIX) tablet 40 mg  40 mg Oral QAM AC Chris Fierro, NHAN        potassium chloride (KLOR-CON M) extended release tablet 10 mEq  10 mEq Oral TID Sakina Vance, NHAN        promethazine (PHENERGAN) suppository 25 mg  25 mg Rectal Q6H PRN Sakina Vance, DPM        promethazine (PHENERGAN) tablet 25 mg  25 mg Oral Q6H PRN Sakina Vance, DPM        therapeutic multivitamin-minerals 1 tablet  1 tablet Oral Daily Chris Fierro DPM        psyllium (METAMUCIL) 58.12 % packet 1 packet  1 packet Oral BID WC Sakina Vance, NHAN        traMADol (ULTRAM) tablet 50 mg  50 mg Oral Q6H PRN Sakina Vance, DPM        ipratropium-albuterol (DUONEB) nebulizer solution 1 ampule  1 ampule Inhalation 4x daily Sakina Vance, DPM   1 ampule at 03/04/23 0033    ketorolac (TORADOL) injection 15 mg  15 mg IntraVENous Q6H PRN Sakina Vance, DPM        meropenem (MERREM) 1,000 mg in sodium chloride 0.9 % 100 mL IVPB (mini-bag)  1,000 mg IntraVENous Q8H Omero Decker MD   Stopped at 03/04/23 0515     Allergies   Allergen Reactions    Risperdal [Risperidone]     Thorazine [Chlorpromazine]     Penicillins Rash     Principal Problem:    Sepsis ([address]  Active Problems:    Hypertension    Intellectual disability    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HCC)    Tardive dyskinesia    Cellulitis of right lower extremity  Resolved Problems:    * No resolved hospital problems. *    Blood pressure 116/67, pulse 88, temperature 97.1 °F (36.2 °C), temperature source Temporal, resp. rate 18, height 5' (1.524 m), weight 140 lb 3.2 oz (63.6 kg), SpO2 97 %, not currently breastfeeding. Subjective: Follow-up right foot debridement. POD # 1.    Objective:  Decreased erythema, though still some streaking along medial thigh. The surgical site is well-appearing. Latest Reference Range & Units Most Recent   WBC 3.5 - 11.3 k/uL 11.4 (H)  3/4/23 05:45     Assessment & Plan  Cellulitis, RLE- Improving slowly on Meropenem, S/P debridement. Vanc added as well due to h/o MRSA and lack of improvement with Ancef. Will follow closely.    Philippe Cooper DPM  3/4/2023

## 2023-03-05 ENCOUNTER — APPOINTMENT (OUTPATIENT)
Dept: GENERAL RADIOLOGY | Age: 73
DRG: 872 | End: 2023-03-05
Payer: MEDICARE

## 2023-03-05 LAB
ABSOLUTE EOS #: <0.03 K/UL (ref 0–0.44)
ABSOLUTE IMMATURE GRANULOCYTE: 0.03 K/UL (ref 0–0.3)
ABSOLUTE LYMPH #: 1.03 K/UL (ref 1.1–3.7)
ABSOLUTE MONO #: 0.66 K/UL (ref 0.1–1.2)
ALBUMIN SERPL-MCNC: 2.7 G/DL (ref 3.5–5.2)
ALBUMIN/GLOBULIN RATIO: 1 (ref 1–2.5)
ALP SERPL-CCNC: 74 U/L (ref 35–104)
ALT SERPL-CCNC: 13 U/L (ref 5–33)
ANION GAP SERPL CALCULATED.3IONS-SCNC: 8 MMOL/L (ref 9–17)
AST SERPL-CCNC: 21 U/L
BASOPHILS # BLD: 0 % (ref 0–2)
BASOPHILS ABSOLUTE: <0.03 K/UL (ref 0–0.2)
BILIRUB SERPL-MCNC: 0.2 MG/DL (ref 0.3–1.2)
BUN SERPL-MCNC: 17 MG/DL (ref 8–23)
BUN/CREAT BLD: 23 (ref 9–20)
CALCIUM SERPL-MCNC: 8.6 MG/DL (ref 8.6–10.4)
CHLORIDE SERPL-SCNC: 107 MMOL/L (ref 98–107)
CO2 SERPL-SCNC: 24 MMOL/L (ref 20–31)
CREAT SERPL-MCNC: 0.75 MG/DL (ref 0.5–0.9)
EOSINOPHILS RELATIVE PERCENT: 0 % (ref 1–4)
GFR SERPL CREATININE-BSD FRML MDRD: >60 ML/MIN/1.73M2
GLUCOSE SERPL-MCNC: 107 MG/DL (ref 70–99)
HCT VFR BLD AUTO: 36.7 % (ref 36.3–47.1)
HGB BLD-MCNC: 12 G/DL (ref 11.9–15.1)
IMMATURE GRANULOCYTES: 1 %
LYMPHOCYTES # BLD: 16 % (ref 24–43)
MCH RBC QN AUTO: 28.3 PG (ref 25.2–33.5)
MCHC RBC AUTO-ENTMCNC: 32.7 G/DL (ref 28.4–34.8)
MCV RBC AUTO: 86.6 FL (ref 82.6–102.9)
MONOCYTES # BLD: 10 % (ref 3–12)
NRBC AUTOMATED: 0 PER 100 WBC
PDW BLD-RTO: 14.9 % (ref 11.8–14.4)
PLATELET # BLD AUTO: 175 K/UL (ref 138–453)
PMV BLD AUTO: 10.6 FL (ref 8.1–13.5)
POTASSIUM SERPL-SCNC: 3.8 MMOL/L (ref 3.7–5.3)
PROT SERPL-MCNC: 5.5 G/DL (ref 6.4–8.3)
RBC # BLD: 4.24 M/UL (ref 3.95–5.11)
SEG NEUTROPHILS: 73 % (ref 36–65)
SEGMENTED NEUTROPHILS ABSOLUTE COUNT: 4.73 K/UL (ref 1.5–8.1)
SODIUM SERPL-SCNC: 139 MMOL/L (ref 135–144)
WBC # BLD AUTO: 6.5 K/UL (ref 3.5–11.3)

## 2023-03-05 PROCEDURE — 71045 X-RAY EXAM CHEST 1 VIEW: CPT

## 2023-03-05 PROCEDURE — 94761 N-INVAS EAR/PLS OXIMETRY MLT: CPT

## 2023-03-05 PROCEDURE — 99232 SBSQ HOSP IP/OBS MODERATE 35: CPT | Performed by: INTERNAL MEDICINE

## 2023-03-05 PROCEDURE — 36415 COLL VENOUS BLD VENIPUNCTURE: CPT

## 2023-03-05 PROCEDURE — 94640 AIRWAY INHALATION TREATMENT: CPT

## 2023-03-05 PROCEDURE — 2580000003 HC RX 258: Performed by: PODIATRIST

## 2023-03-05 PROCEDURE — 6370000000 HC RX 637 (ALT 250 FOR IP): Performed by: PODIATRIST

## 2023-03-05 PROCEDURE — 1200000000 HC SEMI PRIVATE

## 2023-03-05 PROCEDURE — 6370000000 HC RX 637 (ALT 250 FOR IP): Performed by: INTERNAL MEDICINE

## 2023-03-05 PROCEDURE — 6360000002 HC RX W HCPCS: Performed by: INTERNAL MEDICINE

## 2023-03-05 PROCEDURE — 6360000002 HC RX W HCPCS: Performed by: PODIATRIST

## 2023-03-05 PROCEDURE — 74018 RADEX ABDOMEN 1 VIEW: CPT

## 2023-03-05 PROCEDURE — 80053 COMPREHEN METABOLIC PANEL: CPT

## 2023-03-05 PROCEDURE — 2580000003 HC RX 258: Performed by: INTERNAL MEDICINE

## 2023-03-05 PROCEDURE — 85025 COMPLETE CBC W/AUTO DIFF WBC: CPT

## 2023-03-05 PROCEDURE — 94664 DEMO&/EVAL PT USE INHALER: CPT

## 2023-03-05 RX ORDER — CEPHALEXIN 500 MG/1
500 CAPSULE ORAL EVERY 6 HOURS SCHEDULED
Status: DISCONTINUED | OUTPATIENT
Start: 2023-03-05 | End: 2023-03-06 | Stop reason: HOSPADM

## 2023-03-05 RX ORDER — LACTOBACILLUS RHAMNOSUS GG 10B CELL
1 CAPSULE ORAL
Status: DISCONTINUED | OUTPATIENT
Start: 2023-03-06 | End: 2023-03-06 | Stop reason: HOSPADM

## 2023-03-05 RX ADMIN — LORAZEPAM 0.5 MG: 2 INJECTION, SOLUTION INTRAMUSCULAR; INTRAVENOUS at 11:53

## 2023-03-05 RX ADMIN — SODIUM CHLORIDE, PRESERVATIVE FREE 10 ML: 5 INJECTION INTRAVENOUS at 07:51

## 2023-03-05 RX ADMIN — ENOXAPARIN SODIUM 40 MG: 100 INJECTION SUBCUTANEOUS at 07:49

## 2023-03-05 RX ADMIN — ASPIRIN 81 MG: 81 TABLET, COATED ORAL at 07:49

## 2023-03-05 RX ADMIN — ATORVASTATIN CALCIUM 20 MG: 20 TABLET, FILM COATED ORAL at 07:49

## 2023-03-05 RX ADMIN — LISINOPRIL 10 MG: 10 TABLET ORAL at 07:49

## 2023-03-05 RX ADMIN — CETIRIZINE HYDROCHLORIDE 10 MG: 10 TABLET, FILM COATED ORAL at 07:49

## 2023-03-05 RX ADMIN — DOCUSATE SODIUM 100 MG: 100 CAPSULE, LIQUID FILLED ORAL at 07:49

## 2023-03-05 RX ADMIN — MELOXICAM 7.5 MG: 7.5 TABLET ORAL at 07:49

## 2023-03-05 RX ADMIN — CEPHALEXIN 500 MG: 500 CAPSULE ORAL at 12:09

## 2023-03-05 RX ADMIN — OLANZAPINE 15 MG: 5 TABLET, FILM COATED ORAL at 15:43

## 2023-03-05 RX ADMIN — VANCOMYCIN HYDROCHLORIDE 750 MG: 750 INJECTION, POWDER, LYOPHILIZED, FOR SOLUTION INTRAVENOUS at 09:13

## 2023-03-05 RX ADMIN — MEROPENEM 1000 MG: 1 INJECTION, POWDER, FOR SOLUTION INTRAVENOUS at 11:02

## 2023-03-05 RX ADMIN — ACETAMINOPHEN 650 MG: 325 TABLET ORAL at 14:10

## 2023-03-05 RX ADMIN — DOCUSATE SODIUM 100 MG: 100 CAPSULE, LIQUID FILLED ORAL at 20:14

## 2023-03-05 RX ADMIN — LORAZEPAM 0.5 MG: 2 INJECTION, SOLUTION INTRAMUSCULAR; INTRAVENOUS at 02:47

## 2023-03-05 RX ADMIN — BENZONATATE 200 MG: 100 CAPSULE ORAL at 14:10

## 2023-03-05 RX ADMIN — MEROPENEM 1000 MG: 1 INJECTION, POWDER, FOR SOLUTION INTRAVENOUS at 03:27

## 2023-03-05 RX ADMIN — IPRATROPIUM BROMIDE AND ALBUTEROL SULFATE 1 AMPULE: 2.5; .5 SOLUTION RESPIRATORY (INHALATION) at 22:03

## 2023-03-05 RX ADMIN — MUPIROCIN: 20 OINTMENT TOPICAL at 07:50

## 2023-03-05 RX ADMIN — POTASSIUM CHLORIDE 10 MEQ: 10 TABLET, EXTENDED RELEASE ORAL at 12:09

## 2023-03-05 RX ADMIN — POTASSIUM CHLORIDE 10 MEQ: 10 TABLET, EXTENDED RELEASE ORAL at 07:49

## 2023-03-05 RX ADMIN — MULTIPLE VITAMINS W/ MINERALS TAB 1 TABLET: TAB at 07:49

## 2023-03-05 RX ADMIN — FERROUS SULFATE TAB 325 MG (65 MG ELEMENTAL FE) 325 MG: 325 (65 FE) TAB at 07:49

## 2023-03-05 RX ADMIN — Medication 2000 UNITS: at 07:49

## 2023-03-05 RX ADMIN — HYDROCHLOROTHIAZIDE 12.5 MG: 12.5 CAPSULE ORAL at 07:49

## 2023-03-05 RX ADMIN — CEPHALEXIN 500 MG: 500 CAPSULE ORAL at 17:11

## 2023-03-05 RX ADMIN — PANTOPRAZOLE SODIUM 40 MG: 40 TABLET, DELAYED RELEASE ORAL at 07:49

## 2023-03-05 RX ADMIN — PROMETHAZINE HYDROCHLORIDE 25 MG: 25 TABLET ORAL at 15:43

## 2023-03-05 RX ADMIN — POTASSIUM CHLORIDE 10 MEQ: 10 TABLET, EXTENDED RELEASE ORAL at 21:32

## 2023-03-05 NOTE — PROGRESS NOTES
Current Facility-Administered Medications   Medication Dose Route Frequency Provider Last Rate Last Admin    albuterol (PROVENTIL) nebulizer solution 2.5 mg  2.5 mg Nebulization Q4H PRN Dulce Garnica MD        vancomycin [facility]) intermittent dosing (placeholder)   Other RX Placeholder Ed Click, DPM        vancomycin (VANCOCIN) 750 mg in sodium chloride 0.9 % 250 mL IVPB  750 mg IntraVENous Q12H Ed Click, DPM   Stopped at 03/04/23 2235    mupirocin (BACTROBAN) 2 % ointment   Topical Daily Ed Click, DPM   Given at 03/04/23 8382    ipratropium-albuterol (DUONEB) nebulizer solution 1 ampule  1 ampule Inhalation TID Dulce Garnica MD        sodium chloride flush 0.9 % injection 5-40 mL  5-40 mL IntraVENous 2 times per day Ed Click, DPM        sodium chloride flush 0.9 % injection 5-40 mL  5-40 mL IntraVENous PRN Ed Click, DPM        0.9 % sodium chloride infusion   IntraVENous PRN Ed Click, DPM        enoxaparin (LOVENOX) injection 40 mg  40 mg SubCUTAneous Daily Ed Click, DPM   40 mg at 03/04/23 0901    acetaminophen (TYLENOL) tablet 650 mg  650 mg Oral Q6H PRN Ed Click, DPM        Or    acetaminophen (TYLENOL) suppository 650 mg  650 mg Rectal Q6H PRN Ed Click, DPM        LORazepam (ATIVAN) injection 0.5 mg  0.5 mg IntraVENous Q6H PRN Ed Click, DPM   0.5 mg at 03/05/23 0247    aspirin EC tablet 81 mg  81 mg Oral Daily Ed Click, DPM   81 mg at 03/04/23 0900    atorvastatin (LIPITOR) tablet 20 mg  20 mg Oral Daily Ed Click, DPM   20 mg at 03/04/23 0900    benzonatate (TESSALON) capsule 200 mg  200 mg Oral TID PRN Ed Click, DPM        Vitamin D (CHOLECALCIFEROL) tablet 2,000 Units  2,000 Units Oral Daily Ed Click, DPM   2,000 Units at 03/04/23 0903    guaiFENesin-dextromethorphan (ROBITUSSIN DM) 100-10 MG/5ML syrup 10 mL  10 mL Oral Q6H PRN Ed Click, DPM        diphenhydrAMINE (BENADRYL) capsule 25 mg  25 mg Oral Q6H PRN Chris HULL Tyler Barnard, DPM        docusate sodium (COLACE) capsule 100 mg  100 mg Oral BID Pheobe Ream, DPM   100 mg at 03/04/23 0900    ferrous sulfate (IRON 325) tablet 325 mg  325 mg Oral Daily with breakfast Pheobe Ream, DPM   325 mg at 03/04/23 0900    hydroCHLOROthiazide (MICROZIDE) capsule 12.5 mg  12.5 mg Oral Daily Pheobe Ream, DPM   12.5 mg at 03/04/23 0900    lisinopril (PRINIVIL;ZESTRIL) tablet 10 mg  10 mg Oral Daily Pheobe Ream, DPM   10 mg at 03/04/23 0900    cetirizine (ZYRTEC) tablet 10 mg  10 mg Oral Daily Pheobe Ream, DPM   10 mg at 03/04/23 0900    magnesium hydroxide (MILK OF MAGNESIA) 400 MG/5ML suspension 30 mL  30 mL Oral Daily PRN Pheobe Ream, DPM        [Held by provider] meloxicam (MOBIC) tablet 7.5 mg  7.5 mg Oral Daily Pheobe Ream, DPM        OLANZapine (ZYPREXA) tablet 15 mg  15 mg Oral Dinner Pheobe Ream, DPM   15 mg at 03/04/23 1706    pantoprazole (PROTONIX) tablet 40 mg  40 mg Oral QAM AC Pheobe Ream, DPM   40 mg at 03/04/23 0900    potassium chloride (KLOR-CON M) extended release tablet 10 mEq  10 mEq Oral TID Pheobe Ream, DPM   10 mEq at 03/04/23 2015    promethazine (PHENERGAN) suppository 25 mg  25 mg Rectal Q6H PRN Pheobe Ream, DPM        promethazine (PHENERGAN) tablet 25 mg  25 mg Oral Q6H PRN Pheobe Ream, DPM        therapeutic multivitamin-minerals 1 tablet  1 tablet Oral Daily Pheobe Ream, DPM   1 tablet at 03/04/23 0900    psyllium (METAMUCIL) 58.12 % packet 1 packet  1 packet Oral BID WC Pheobe Ream, DPM   1 packet at 03/04/23 0903    traMADol (ULTRAM) tablet 50 mg  50 mg Oral Q6H PRN Pheobe Ream, DPM        ketorolac (TORADOL) injection 15 mg  15 mg IntraVENous Q6H PRN Pheobe Ream, DPM   15 mg at 03/04/23 1901    meropenem (MERREM) 1,000 mg in sodium chloride 0.9 % 100 mL IVPB (mini-bag)  1,000 mg IntraVENous Q8H Andrew Whitley MD 33.3 mL/hr at 03/05/23 0327 1,000 mg at 03/05/23 0327     Allergies   Allergen Reactions    Risperdal [Risperidone] Thorazine [Chlorpromazine]     Penicillins Rash     Principal Problem:    Sepsis ([facility] [facility]  Active Problems:    Hypertension    Intellectual disability    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[facility] [address]    Tardive dyskinesia    Cellulitis of right lower extremity  Resolved Problems:    * No resolved hospital problems. *    Blood pressure 139/87, pulse (!) 102, temperature 98.2 °F (36.8 °C), temperature source Temporal, resp. rate 22, height 5' (1.524 m), weight 140 lb 3.2 oz (63.6 kg), SpO2 96 %, not currently breastfeeding. Subjective: Follow-ip RLE cellulitis. S/P right foot debridement. POD #2. Resting. Objective:   Erythema of the RLE largely resolved. Minimal edema present. WBC WNL. No growth on OR tissue culture. Assessment & Plan:   Cellulitis RLE- largely resolved. Home going antibiotics per ID. OK for discharge from my standpoint. D/C orders entered. See me within 2 weeks.      Flaquito Ngo DPM  3/5/2023

## 2023-03-05 NOTE — DISCHARGE INSTRUCTIONS
Apply Bactroban ointment and dressing to medial arch wound, right foot. Change daily.    May be weightbearing as tolerated

## 2023-03-05 NOTE — PROGRESS NOTES
[facility]  Inpatient/Observation/Outpatient Rehabilitation    Date: 3/5/2023  Patient Name: Sasha Donnelly       [x] Inpatient Acute/Observation    : 1950       [x] Pt refused/declined therapy at this time due to:        PT in with RN and patient refused to transfer or participate    Therapist/Assistant will attempt to see this patient, at our earliest opportunity.        Elizabeth Wilson, PT, DPT Date: 3/5/2023

## 2023-03-05 NOTE — PLAN OF CARE
Problem: Safety - Adult  Goal: Free from fall injury  Note: ID band correct and in place. Bed locked and in lowest position. Call light within reach. Patient free from injury. Will continue to monitor. Problem: Skin/Tissue Integrity  Goal: Absence of new skin breakdown  Description: 1. Monitor for areas of redness and/or skin breakdown  2. Assess vascular access sites hourly  3. Every 4-6 hours minimum:  Change oxygen saturation probe site  4. Every 4-6 hours:  If on nasal continuous positive airway pressure, respiratory therapy assess nares and determine need for appliance change or resting period. Note: Skin assessment performed, no breakdown noted at this time. Patient skin is dry and intact. Will continue to monitor for redness or breakdown. Problem: Pain  Goal: Verbalizes/displays adequate comfort level or baseline comfort level  Flowsheets (Taken 3/5/2023 0026)  Verbalizes/displays adequate comfort level or baseline comfort level:   Assess pain using appropriate pain scale   Administer analgesics based on type and severity of pain and evaluate response  Note: Pain assessed every 4 hours. Will continue to monitor. Problem: Skin/Tissue Integrity - Adult  Goal: Incisions, wounds, or drain sites healing without S/S of infection  Note: Skin assessment performed, no new breakdown noted at this time. Patient skin is dry and intact. Will continue to monitor for redness or breakdown.

## 2023-03-05 NOTE — PROGRESS NOTES
[facility] -Progress Note- Telemedicine  Today's Date and Time: 3/5/2023, 11:32 AM    Impression :   RLE cellulitis. Appearance suggestive of Strep cellulitis  Foot cellulitis  S/P I&D on 3-3-23 with findings of phlegmon, no abscess  Fever  Leukocytosis  HTN  MRDD  Schizophrenia  Tardive dyskinesia  Frequent falls  Osteoporosis  Kyphosis  PCN allergy    Recommendations:   D/C Meropenem   Cephalexin 500 mg po qid. Stop date 3-12-23 to complete treatment. Medical Decision Making/Summary/Discussion:3/5/2023       Infection Control Recommendations   [place] Precautions    Antimicrobial Stewardship Recommendations     Simplification of therapy  Targeted therapy  Coordination of Outpatient Care:   Estimated Length of IV antimicrobials:TBD  Patient will need Midline Catheter Insertion: No  Patient will need PICC line Insertion:No  Patient will need: Home IV , [place],  SNF,  LTAC: TBD  Patient will need outpatient wound care:no    Chief complaint/reason for consultation:   sepsis      History of Present Illness:   Trisha Lee is a 71y.o.-year-old female who was initially admitted on 3/2/2023. Patient seen at the request of Dr. Jhonatan Eldridge:    This patient, with MRDD, presented from her group home with fever x 1 day of 40.1 Celsius. She has not experienced N/V/D and her vital signs are stable on room air. She is mostly non-verbal.    Sanjay Saucer is mostly unremarkable other than a WBC of 18.1. Viral respiratory panel was negative. Blood cultures have yielded no growth and urinalysis is not indicative of a UTI. She has a wound on her right ankle with surrounding redness and warmth to her RLE. A CT showed mild pulmonary vascular congestion with mild, dependant atelectatic changes in the posterior lung bases. Multiple hypodense lesions were noted on the liver. The pancrease is markedly atrophic without acute process.    Findings in the bowel showing probable enteritis and marked sigmoid diverticulosis without diverticulitis. The patient was given a dose of flagyl and Cipro, and was initiated on clindamycin. She does have a penicillin allergy. CT chest/abd/pelvis 3/2/23  Impression   Mild dependent atelectatic changes at the posterior lung bases bilaterally. Otherwise no confluent pneumonia or pulmonary atelectasis. No pleural   effusion or pneumothorax. Minimal pulmonary vascular congestion. Probable   mild interstitial pulmonary edema in lungs. No acute process in the mediastinum. Normal thoracic aorta. No evidence of   pulmonary embolism in the visualized central pulmonary arteries, central   branches and paracentral branches of pulmonary arteries. There is small to moderate retrocardiac hiatal hernia. There are multiple hypodense nonenhancing lesions scattered in liver most   likely to be multiple hepatic cysts. No diagnostic finding in gallbladder, spleen, and adrenal glands. Moderate   to markedly atrophic pancreas without any acute process. No renal stone or   obstructive uropathy. Normal appendix visualized. Small amount of increased fluid and small amount of gas scattered in small   bowel with few fluid levels. Probable acute enteritis. Moderate amount of stool present throughout most of colon. Moderate to   marked sigmoid diverticulosis, without diverticulitis. All mesenteric arteries and major branches are patent and opacified. Marked lumbar dextroscoliosis with moderate to marked multilevel spondylotic   changes. Moderate compression of upper aspect of L2 vertebral body, probably   chronic.              ID was consulted for antibiotic recommendations    CURRENT EVALUATION 3/5/2023  /84   Pulse 83   Temp 98.1 °F (36.7 °C) (Temporal)   Resp 22   Ht 5' (1.524 m)   Wt 140 lb 3.2 oz (63.6 kg)   SpO2 96%   BMI 27.38 kg/m²       Patient underwent foot I&D on 3-3-23    Afebrile  VS stable    Patient feels better  No complaints  No new issues per RN    Medications reviewed  Will switch to po Keflex 500 mg qid. Stop date 3-12-23 to complete treatment    Appearance of the border of cellulitis suggested Streptococcal cellulitis. Labs, X rays reviewed: 3/5/2023    BUN:21-->17  Cr:0.69-->0.62-->0.75    WBC:18.7-->11.4-->6.5  Hb:15.3-->12.0  Plat: 257-->164-->175    CRP: 87.5    Cultures:  Urine:  3/2/23: No UTI  Blood:  3/2/23: No growth thus far  Sputum :    Wound:  3-3-23: No bacteria by Gram stain. Culture pending. MRSA Nares:  3/3/23: negative      Imaging:          RLE 3/2/23          CT chest 3/2/23      CXR 3/2/23          Discussed with PATRICK RN. I have personally reviewed the past medical history, past surgical history, medications, social history, and family history, and I have updated the database accordingly. 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  soft tissue mass    FOOT SURGERY Left 02/20/2015    FOOT SURGERY N/A 06/21/2013    removal of verruca    FOOT SURGERY Right 10/01/2012    surgical removal of warts    INCISION AND DRAINAGE FOOT  03/03/2023    Dr. Janey Manriquez Right 01/11/2011    OTHER SURGICAL HISTORY Left 10/16/2015    Nail bed excision     POLYPECTOMY  01/01/1995    vocal cord       Medications: vancomycin (VANCOCIN) intermittent dosing (placeholder)   Other RX Placeholder    vancomycin  750 mg IntraVENous Q12H    mupirocin   Topical Daily    ipratropium-albuterol  1 ampule Inhalation TID    sodium chloride flush  5-40 mL IntraVENous 2 times per day    enoxaparin  40 mg SubCUTAneous Daily    aspirin  81 mg Oral Daily    atorvastatin  20 mg Oral Daily    Vitamin D  2,000 Units Oral Daily    docusate sodium  100 mg Oral BID    ferrous sulfate  325 mg Oral Daily with breakfast    hydroCHLOROthiazide  12.5 mg Oral Daily    lisinopril  10 mg Oral Daily    cetirizine  10 mg Oral Daily    meloxicam  7.5 mg Oral Daily    OLANZapine  15 mg Oral Dinner    pantoprazole  40 mg Oral QAM AC    potassium chloride  10 mEq Oral TID    therapeutic multivitamin-minerals  1 tablet Oral Daily    psyllium  1 packet Oral BID WC    meropenem  1,000 mg IntraVENous Q8H       Social History:     Social History     Socioeconomic History    Marital status: Single     Spouse name: Not on file    Number of children: Not on file    Years of education: Not on file    Highest education level: Not on file   Occupational History    Not on file   Tobacco Use    Smoking status: Never    Smokeless tobacco: Not on file   Substance and Sexual Activity    Alcohol use: No    Drug use: Not on file    Sexual activity: Not on file   Other Topics Concern    Not on file   Social History Narrative    Not on file     Social Determinants of Health     Financial Resource Strain: Not on file   Food Insecurity: Not on file   Transportation Needs: Not on file   Physical Activity: Not on file   Stress: Not on file   Social Connections: Not on file   Intimate Partner Violence: Not on file   Housing Stability: Not on file       Family History:   History reviewed. No pertinent family history. Allergies:   Risperdal [risperidone], Thorazine [chlorpromazine], and Penicillins     Review of Systems:   TANISHA  Constitutional: No fevers or chills.  No systemic complaints  Head: No headaches  Eyes: No double vision or blurry vision. No conjunctival inflammation. ENT: No sore throat or runny nose. . No hearing loss, tinnitus or vertigo. Cardiovascular: No chest pain or palpitations. No shortness of breath. No COYNE  Lung: No shortness of breath or cough. No sputum production  Abdomen: No nausea, vomiting, diarrhea, or abdominal pain. Erleen Dueñas No cramps. Genitourinary: No increased urinary frequency, or dysuria. No hematuria. No suprapubic or CVA pain  Musculoskeletal: No muscle aches or pains. No joint effusions, swelling or deformities  Hematologic: No bleeding or bruising. Neurologic: No headache, weakness, numbness, or tingling. Integument: No rash, no ulcers. Psychiatric: No depression. Endocrine: No polyuria, no polydipsia, no polyphagia. Physical Examination :   Patient Vitals for the past 8 hrs:   BP Temp Temp src Pulse Resp SpO2 Weight   03/05/23 0815 130/84 98.1 °F (36.7 °C) Temporal 83 22 96 % --   03/05/23 0455 -- -- -- -- -- -- 140 lb 3.2 oz (63.6 kg)       General Appearance: Awake, alert, non-verbal, MRDD hx  Head:  Normocephalic, no trauma  Eyes: Pupils equal, round, reactive to light and accommodation; extraocular movements intact; sclera anicteric; conjunctivae pink. No embolic phenomena. ENT: Oropharynx clear, without erythema, exudate, or thrush. No tenderness of sinuses. Mouth/throat: mucosa pink and moist. No lesions. Dentition in good repair. Neck:Supple, without lymphadenopathy. Thyroid normal, No bruits. Pulmonary/Chest: Diminished to auscultation, without wheezes, rales, or rhonchi. No dullness to percussion. Cardiovascular: Regular rate and rhythm without murmurs, rubs, or gallops. Abdomen: Soft, non tender. Bowel sounds normal. No organomegaly  All four Extremities: RLE ankle scab with surrounding erythema  Neurologic:non-verbal, MRDD hx  Skin: Warm and dry with good turgor. No signs of peripheral arterial or venous insufficiency.  RLE ankle scab with surrounding erythema going up the leg. Circinate border of erythema. Medical Decision Making -Laboratory:   I have independently reviewed/ordered the following labs:    CBC with Differential:   Recent Labs     03/04/23  0545 03/05/23  0535   WBC 11.4* 6.5   HGB 12.4 12.0   HCT 37.8 36.7    175   LYMPHOPCT 7* 16*   MONOPCT 6 10       BMP:   Recent Labs     03/04/23 0545 03/05/23  0535    139   K 3.8 3.8   * 107   CO2 21 24   BUN 17 17   CREATININE 0.62 0.75       Hepatic Function Panel:   Recent Labs     03/04/23  0545 03/05/23  0535   PROT 5.6* 5.5*   LABALBU 3.0* 2.7*   BILITOT 0.2* 0.2*   ALKPHOS 84 74   ALT 13 13   AST 18 21       No results for input(s): RPR in the last 72 hours. No results for input(s): HIV in the last 72 hours. No results for input(s): BC in the last 72 hours. Lab Results   Component Value Date/Time    MUCUS 1+ 03/02/2023 10:08 PM    RBC 4.24 03/05/2023 05:35 AM    RBC 4.98 01/10/2012 06:10 AM    TRICHOMONAS NOT REPORTED 01/15/2019 06:35 PM    WBC 6.5 03/05/2023 05:35 AM    YEAST NOT REPORTED 01/15/2019 06:35 PM    TURBIDITY Clear 03/02/2023 10:08 PM     Lab Results   Component Value Date/Time    CREATININE 0.75 03/05/2023 05:35 AM    GLUCOSE 107 03/05/2023 05:35 AM    GLUCOSE 86 01/10/2012 06:10 AM       Medical Decision Making-Imaging:     Narrative   EXAMINATION:   CT OF THE CHEST, ABDOMEN, AND PELVIS WITH CONTRAST 3/2/2023 9:54 pm       TECHNIQUE:   CT of the chest, abdomen and pelvis was performed with the administration of   intravenous contrast. Multiplanar reformatted images are provided for review. Automated exposure control, iterative reconstruction, and/or weight based   adjustment of the mA/kV was utilized to reduce the radiation dose to as low   as reasonably achievable.        COMPARISON:   None       HISTORY:   ORDERING SYSTEM PROVIDED HISTORY: Fever, leukocytosis, nonverbal   TECHNOLOGIST PROVIDED HISTORY:   Fever, leukocytosis, nonverbal Decision Support Exception - unselect if not a suspected or confirmed   emergency medical condition->Emergency Medical Condition (MA)       FINDINGS:       Chest:       Mediastinum: No evidence of mass or pathologic lymphadenopathy or acute   process in the mediastinum. Thoracic aorta is of normal size, without evidence of aneurysm or dissection. In the opacified central pulmonary arteries and their central and paracentral   branches there is no definable pulmonary embolism. No evidence of pericardial effusion or pericardial thickening. Evidence of   coronary artery calcification. Lungs/pleura: Evidence of mild streaky atelectatic changes, with or without   subtle infiltrates in the right pulmonary lower lobe. In the rest of both   lungs, there is no other focal infiltrates or atelectatic changes. There is   mild pulmonary vascular congestion. The interstitial markings are hazy and   therefore interstitial pulmonary edema is not excluded. No evidence of   pneumothorax. Soft Tissues/Bones: In the thoracic spine, evidence of moderate kyphosis and   moderate multilevel degenerative disc disease without definable fracture. Sternum is intact. In visualized bilateral ribs there is no definable   fracture. Abdomen/Pelvis:       Organs: No evidence of pneumoperitoneum. There is small to moderate size   retrocardiac hiatal hernia. In the lower lateral portion of right hepatic lobe, there is hypodense   nonenhancing lesion measuring 2.3 cm in diameter. In the upper most portion   of right hepatic lobe there is a hypodense nonenhancing lesion measuring 1.6   cm in diameter. In the upper portion of right hepatic lobe there is another   hypodense nonenhancing lesion measuring 0.8 cm. At the inferior aspect of   right hepatic lobe there is a tiny nonenhancing lesion measuring 0.55 cm.   At   the inferior aspect of left medial segment of liver there is a tiny nonenhancing lesion measuring 0.4 cm. In the midportion right hepatic lobe   there is nonenhancing lesion measuring 0.45 cm. These multiple nonenhancing   hepatic lesions are most likely to be hepatic cysts. Normal size of spleen with a few tiny calcified granulomas. Normal bilateral adrenal glands. Moderate to markedly atrophic pancreas without focal abnormality and without   any acute process. In the right and left kidney there are a few tiny subcentimeter cortical   cysts without any other mass. No evidence of stone or hydronephrosis in   either kidney. There is no obstructive uropathy. Bladder is mildly distended without evidence of stone or focal abnormality. GI/Bowel: No diagnostic finding in visualized stomach. Small to moderate   amount of gas scattered in proximal and mid small bowel and minimal gas   scattered in distal small bowel. Mild increased fluid contents in the distal   small bowel. There are a few small fluid levels in small bowel. No obvious   small bowel wall thickening. Normal appendix posteroinferior to cecum. Moderate amount of stool and small amount of gas are present in the right   colon and transverse colon. Descending colon contains small amount of stool   and gas. In the sigmoid colon small amount of stool and gas are scattered. In rectum moderate amount of stool and gas are present. No perirectal abnormality. Evidence of moderate to marked diverticulosis of sigmoid colon without   evidence of diverticulitis. No evidence of colitis. Pelvis: No abnormal fluid collection or focal inflammatory process in the   pelvis. No pelvic mass or pathologic lymphadenopathy. Peritoneum/Retroperitoneum: Abdominal aorta is of normal size with mild   calcified plaques, without aortic dissection. No evidence of periaortic or   mesenteric pathologic lymphadenopathy.        Normally opacified patent celiac artery and branches and SMA and branches. GA is also patent. Bilateral renal arteries are grossly normally patent. No evidence of ascites. Normally patent and opacified portal vein, the splenic vein and the SMV. Bones/Soft Tissues: Evidence of severe dextrorotatory scoliosis in lumbar   spine with multilevel moderate to severe degenerative disc disease. At L5-S1   level there is grade 2 spondylolisthesis with bilateral pars defects and   advanced DDD and facet osteoarthritis. At L1-L2 level moderate degenerative disc disease with moderate compression   of upper aspect of L2 vertebral body, probably chronic. No evidence of   inguinal, femoral or ventral hernia. Evidence of previous internal fixation of fracture in the proximal right   femur. Impression   Mild dependent atelectatic changes at the posterior lung bases bilaterally. Otherwise no confluent pneumonia or pulmonary atelectasis. No pleural   effusion or pneumothorax. Minimal pulmonary vascular congestion. Probable   mild interstitial pulmonary edema in lungs. No acute process in the mediastinum. Normal thoracic aorta. No evidence of   pulmonary embolism in the visualized central pulmonary arteries, central   branches and paracentral branches of pulmonary arteries. There is small to moderate retrocardiac hiatal hernia. There are multiple hypodense nonenhancing lesions scattered in liver most   likely to be multiple hepatic cysts. No diagnostic finding in gallbladder, spleen, and adrenal glands. Moderate   to markedly atrophic pancreas without any acute process. No renal stone or   obstructive uropathy. Normal appendix visualized. Small amount of increased fluid and small amount of gas scattered in small   bowel with few fluid levels. Probable acute enteritis. Moderate amount of stool present throughout most of colon.   Moderate to   marked sigmoid diverticulosis, without diverticulitis. All mesenteric arteries and major branches are patent and opacified. Marked lumbar dextroscoliosis with moderate to marked multilevel spondylotic   changes. Moderate compression of upper aspect of L2 vertebral body, probably   chronic. Narrative   EXAMINATION:   ONE XRAY VIEW OF THE CHEST       3/2/2023 9:13 pm       COMPARISON:   None. HISTORY:   ORDERING SYSTEM PROVIDED HISTORY: fever   TECHNOLOGIST PROVIDED HISTORY:   fever       FINDINGS:   Chest radiograph: The cardiomediastinal silhouette and hilar contours are   prominent with congestion of bilateral mary. Increased interstitial markings   likely suggestive of mild pulmonary edema. The lungs are otherwise clear   with no focal consolidation, pleural effusion or pneumothorax. The overlying   soft tissue and osseous structures do not demonstrate acute abnormality. Unchanged chronic fracture deformities of midthoracic vertebral bodies. Impression       Increased interstitial markings likely suggestive of mild pulmonary edema. Medical Decision [id number]:       Medical Decision Making-Other:     Note:  Labs, medications, radiologic studies were reviewed with personal review of films  Large amounts of data were reviewed  Discussed with nursing Staff, Discharge planner  Infection Control and Prevention measures reviewed  All prior entries were reviewed  Administer medications as ordered  [facility] [address]  Discharge planning reviewed    Thank you for allowing us to participate in the care of this patient. Please call with questions.     Joelle Ann MD      Pager: [phone] - Office: [phone]

## 2023-03-05 NOTE — RT PROTOCOL NOTE
RESPIRATORY ASSESSMENT PROTOCOL                                                                                              Patient Name: Leondias Gonzalez Room#: 7072/3774-33 : 1950     Admitting diagnosis: Dehydration [E86.0]  Gastroenteritis [K52.9]  Sepsis ([facility] [address] [A41.9]  Sepsis, due to unspecified organism, unspecified whether acute organ dysfunction present [facility]) [A41.9]       Medical History:   Past Medical History:   Diagnosis Date    Arthritis     Cellulitis of right lower extremity 3/3/2023    Falls frequently     2011    Femur fracture ([facility] [address]     Fibrocystic breast     Hyperlipidemia     Hypertension     Kyphosis deformity of spine     Mental retardation     moderate    Osteoporosis     Pelvis fracture ([facility]     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[facility]     disorganized type    Scoliosis deformity of spine     Tardive dyskinesia        PATIENT ASSESSMENT    LABORATORY DATA  Hematology:   Lab Results   Component Value Date/Time    WBC 6.5 2023 05:35 AM    RBC 4.24 2023 05:35 AM    RBC 4.98 01/10/2012 06:10 AM    HGB 12.0 2023 05:35 AM    HCT 36.7 2023 05:35 AM     2023 05:35 AM     01/10/2012 06:10 AM     Chemistry:  No results found for: PHART, MYE5FNK, PO2ART, T2RQABWO, EDA6BCZ, PBEA    VITALS  Heart Rate: 78   Resp: 22  BP: 131/75  SpO2: 96 % O2 Device: None (Room air)  Temp: 98.1 °F (36.7 °C)    SKIN COLOR  [x] Normal  [] Pale  [] Dusky  [] Cyanotic    RESPIRATORY PATTERN  [x] Normal  [] Dyspnea  [] Cheyne-Baer  [] Kussmaul  [] Biots    AMBULATORY  [] Yes  [x] No  [] With Assistance      Patient Acuity 0 1 2 3 4 Score   Level of Consciousness (LOC) [x]  Alert & Oriented or Pt normal LOC []  Confused;follows directions []  Confused & uncooper-ative []  Obtunded []  Comatose 0   Respiratory Rate  (RR) []  Reg. rate & pattern. 12 - 20 bpm  []  Increased RR.  Greater than 20 bpm   [x]  SOB w/ exertion or RR greater than 24 bpm []  Access- ory muscle use at rest. Abn.  resp. []  SOB at rest.   2   Bilateral Breath Sounds (BBS) []  Clear [x]  Diminish-ed bases  []  Diminish-ed t/o, or rales   []  Sporadic, scattered wheezes or rhonchi []  Persistentwheezes and, or absent BBS 1   Cough [x]  Strong, effective, & non-prod. []  Effective & prod. Less than 25 ml (2 TBSP) over past 24 hrs []  Ineffective & non-prod to less than 25 ML over past 24 hrs []  Ineffective and, or greater than 25 ml sputum prod. past 24 hrs. []  Nonspon- taneous; Requires suctioning 0   Pulmonary History  (PULM HX) [x]  No smoking and no chronic pulmonary history []  Former smoker. Quit over 12 mos. ago []  Current smoker or quit w/ in 12 mos []  Pulm. History and, or 20 pk/yr smoking hx []  Admitted w/ acute pulm. dx and, or has been admitted w/ pulm. dx 2 or more times over past 12 mos 0   Surgical History this Admit  (SURG HX) [x]  No surgery []  General surgery []  Lower abdominal []  Thoracic or upper abdominal   []  Thoracic w/ pulm. disease 0   Chest X-Ray (CXR)/CT Scan []  Clear or not applicable []  Not available [x]  Atelectasis or pleural effusions []  Localized infiltrate or pulm. edema []  Con-solidated Infiltrates, bilateral, or in more than 1 lobe 2   TOTAL ACUITY: 5       CARE PLAN    If Acuity Level is 2, 3, or 4 in any of the following:    [] BILATERAL BREATH SOUNDS (BBS)     [] PULMONARY HISTORY (PULM HX)  [] Respiratory Rate  (RR)    Goal: Improve respiratory functions in patients with airway disease and decrease WOB    [x] AEROSOL PROTOCOL    Total Acuity:   14-28  []  Secondary Assessment in 24 hrs Total Acuity:  9-13  []  Secondary Assessment in 24 hrs Total Acuity:  4-8  [x]  Secondary Assessment in 24 hrs Total Acuity:  0-3  []  Secondary Assessment in 48 hrs   HHN AEROSOL THERAPY with  [physician-ordered bronchodilator(s)] q 4 & Albuterol PRN q2 hrs.   Breath-Actuated Neb if BBS Acuity = 4, and pt. can use MP. Notify physician if condition deteriorates.  HHN AEROSOL THERAPY  with  [physician-ordered bronchodilator(s)]  QID and Albuterol PRN q4 hrs. Breath-Actuated Neb if BBS Acuity = 4, and pt. can use MP. Notify physician if condition deteriorates. MDI THERAPY with  2 actuations of [physician-ordered bronchodilator(s)] via spacer TID Albuterol and PRN q4 hrs. If unable to utilize MDI: HHN [physician-ordered bronchodilator(s)] TID and Albuterol PRN q4 hrs. Notify physician if condition deteriorates. MDI THERAPY with  [physician-ordered bronchodilator(s)] via spacer TID PRN. If unable to utilize MDI: HHN [physician-ordered bronchodilator(s)] TID PRN. Notify physician if condition deteriorates. If Acuity Level is 2, 3, or 4 in any of the following:    [] COUGH     [] SURGICAL HISTORY (SURG HX)  [] CHEST XRAY (CXR)    Goal: Improvement in sputum mobilization in patients with ineffective airway clearance. Reverse atelectasis. [x] Bronchopulmonary Hygiene Protocol      Total Acuity:   14-28  []  Secondary Assessment in 24 hrs Total Acuity:  9-13  []  Secondary Assessment in 24 hrs Total Acuity:  4-8  [x]  Secondary Assessment in 24 hrs Total Acuity:  0-3  []  Secondary Assessment in 48 hrs   METANEB QID with [physician-ordered bronchodilator(s)] if CXR Acuity = 4; otherwise:  PD&P, Oscillatory Therapy, or Vest QID & PRN AND PEP QID & PRN  NT Sxn PRN for ineffective cough  METANEB QID with [physician-ordered bronchodilator(s)] if CXR Acuity = 4; otherwise:  PD&P, Oscillatory Therapy or Vest QID & PRN AND PEP QID & PRN  NT Sxn PRN for ineffective cough  PD&P, Oscillatory Therapy, or Vest TID & PRN AND PEP TID & PRN   Instruct patient to self-perform IS q1hr WA       If Acuity Level is 2 or above in the following:    [] PULMONARY HISTORY (PULM HX)    Goal: Assist patient in quitting smoking to slow or stop the progression of lung disease.     [] Smoking Cessation Protocol    SMOKING CESSATION EDUCATION provided according to policy OJ_428: (leonor with an X)  ____Yes    ____ No     ____ NA    Smoking Cessation Booklet given:  ____Yes  ____No ____Patient [place]

## 2023-03-05 NOTE — PROGRESS NOTES
[address], [address]    Progress Note    Date:   3/5/2023  Patient name:  Winsome Gamboa  Date of admission:  3/2/2023  8:39 PM  MRN:   [id number]  YOB: 1950    SUBJECTIVE/Last 24 hours update:     Patient seen and examined at the bed side , no new acute events overnight and no new complains although the patient provides verbal communication that makes the evaluation difficult. Notes from nursing staff and Consults had been reviewed, and the overnight progress had been checked with the nursing staff as well. REVIEW OF SYSTEMS:    The patient denies the following, although the accuracy may be hindered by the underlying intellectual disability:  CONSTITUTIONAL:  no fevers, no headcahes  EYES: negative for blury vision  HEENT: No headaches, No nasal congestion, no difficulty swallowing  RESPIRATORY:negative for dyspnea, no wheezing, no Cough  CARDIOVASCULAR: negative for chest pain, no palpitations  GASTROINTESTINAL: no nausea, no vomiting, no change in bowel habits, no abdominal pain   GENITOURINARY: negative for dysuria, no hematuria   MUSCULOSKELETAL: no joint pains, no muscle aches, no swelling of joints or extremities  NEUROLOGICAL: No  Weakness or numbness    PAST MEDICAL HISTORY:      has a past medical history of Arthritis, Cellulitis of right lower extremity, Falls frequently, Femur fracture ([address] Fibrocystic breast, Hyperlipidemia, Hypertension, Kyphosis deformity of spine, Mental retardation, Osteoporosis, Pelvis fracture ([address] 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[address] Scoliosis deformity of spine, and Tardive dyskinesia. PAST SURGICAL HISTORY:      has a past surgical history that includes Fibula Fracture Surgery (Left, 08/01/1985); polypectomy (01/01/1995); orif femur decompression (Right, 01/11/2011); Foot surgery (Left, 07/01/2012); Foot surgery (Left, 02/20/2015); Foot surgery (N/A, 06/21/2013);  Foot surgery (Right, 10/01/2012); other surgical history (Left, 10/16/2015); and Incision and drainage foot (03/03/2023). SOCIAL HISTORY:      reports that she has never smoked. She does not have any smokeless tobacco history on file. She reports that she does not drink alcohol. TOBACCO:   reports that she has never smoked. She does not have any smokeless tobacco history on file. ETOH:   reports no history of alcohol use. FAMILY HISTORY:   History reviewed. No pertinent family history. HOME MEDICATIONS:      Prior to Admission medications    Medication Sig Start Date End Date Taking? Authorizing Provider   meloxicam (MOBIC) 7.5 MG tablet Take 7.5 mg by mouth daily   Yes Historical Provider, MD   Cholecalciferol (VITAMIN D3) 50 MCG (2000 UT) CAPS Take 2,000 Units by mouth daily   Yes Historical Provider, MD   benzonatate (TESSALON) 200 MG capsule Take 200 mg by mouth 3 times daily as needed for Cough   Yes Historical Provider, MD   loratadine (CLARITIN) 10 MG tablet Take 10 mg by mouth daily as needed   Yes Historical Provider, MD   traMADol (ULTRAM) 50 MG tablet Take 50 mg by mouth every 6 hours as needed for Pain. Yes Historical Provider, MD   Wheat Dextrin (BENEFIBER) POWD Take 4 g by mouth 2 times daily (with meals) Patient only takes 1 tablespoon   Yes Historical Provider, MD   aluminum & magnesium hydroxide-simethicone (MAALOX) 200-200-20 MG/5ML SUSP suspension Take 5 mLs by mouth every 4 hours as needed for Indigestion   Yes Historical Provider, MD   petrolatum, lanolin, 8-hydroxyquinoline sulfate (BAG BALM) OINT Apply 1 Dose topically in the morning and at bedtime Apply topically to bilateral feet and both halluxes   Yes Historical Provider, MD   triazolam (HALCION) 0.25 MG tablet Take 0.25 mg by mouth once as needed.  Take 1 hour before dental procedure 1/12/23   Historical Provider, MD   omeprazole (PRILOSEC) 20 MG delayed release capsule Take 40 mg by mouth daily    Historical Provider, MD   aspirin 81 MG tablet Take 81 mg by mouth daily. Historical Provider, MD   atorvastatin (LIPITOR) 20 MG tablet Take 20 mg by mouth daily. Historical Provider, MD   docusate sodium (COLACE) 100 MG capsule Take 100 mg by mouth 2 times daily. Historical Provider, MD   ferrous sulfate 325 (65 FE) MG tablet Take 325 mg by mouth daily (with breakfast). Historical Provider, MD   hydrochlorothiazide (MICROZIDE) 12.5 MG capsule Take 12.5 mg by mouth daily. Historical Provider, MD   lisinopril (PRINIVIL;ZESTRIL) 5 MG tablet Take 10 mg by mouth daily    Historical Provider, MD   therapeutic multivitamin-minerals (THERAGRAN-M) tablet Take 1 tablet by mouth daily.     Historical Provider, MD   OLANZapine (ZYPREXA) 15 MG tablet Take 15 mg by mouth Daily with supper Takes at 1600    Historical Provider, MD   potassium chloride SA (K-DUR;KLOR-CON M) 10 MEQ tablet Take 10 mEq by mouth 3 times daily     Historical Provider, MD   acetaminophen (TYLENOL) 325 MG tablet Take 650 mg by mouth every 4 hours as needed for Pain or Fever    Historical Provider, MD   diphenhydrAMINE (BENADRYL) 25 MG capsule Take 25 mg by mouth every 6 hours as needed (Cold symptoms)    Historical Provider, MD   magnesium hydroxide (MILK OF MAGNESIA) 400 MG/5ML suspension Take 30 mLs by mouth daily as needed for Constipation    Historical Provider, MD   promethazine (PHENERGAN) 25 MG suppository Place 25 mg rectally every 6 hours as needed for Nausea (or vomiting)    Historical Provider, MD   promethazine (PHENERGAN) 25 MG tablet Take 25 mg by mouth every 6 hours as needed for Nausea    Historical Provider, MD   Dextromethorphan-guaiFENesin  MG/5ML SYRP Take 10 mLs by mouth every 6 hours as needed for Cough    Historical Provider, MD       ALLERGIES:     Risperdal [risperidone], Thorazine [chlorpromazine], and Penicillins      OBJECTIVE:       Vitals:    03/04/23 1345 03/04/23 1822 03/05/23 0245 03/05/23 0455   BP: (!) 103/53 139/87     Pulse: 91 95 (!) 102    Resp: 20 22 22 Temp: 97.6 °F (36.4 °C) 97.8 °F (36.6 °C) 98.2 °F (36.8 °C)    TempSrc: Temporal Temporal Temporal    SpO2: 97% 96%     Weight:    140 lb 3.2 oz (63.6 kg)   Height:             Intake/Output Summary (Last 24 hours) at 3/5/2023 2352  Last data filed at 3/5/2023 0257  Gross per 24 hour   Intake 690 ml   Output --   Net 690 ml       PHYSICAL EXAM:  General Appearance  Alert , awake , not in acute distress, non-verbal at this time. HEENT - Head is normocephalic, atraumatic. Lungs - Bilateral equal air entry , minimal wheezes, rales or rhonchi, aeration good  Cardiovascular - Heart sounds are normal.  Regular rhythm, normal rate without murmur, gallop or rub.   Abdomen - Soft, nontender, nondistended, no masses or organomegaly  Neurologic - There are no new focal motor or sensory deficits  Skin - No bruising or bleeding on exposed skin area, dressing c/d/i  Extremities - No cyanosis, clubbing or edema      DIAGNOSTICS:     Laboratory Testing:    See [employer] EMR for lab data    Recent Results (from the past 24 hour(s))   CBC auto differential    Collection Time: 03/05/23  5:35 AM   Result Value Ref Range    WBC 6.5 3.5 - 11.3 k/uL    RBC 4.24 3.95 - 5.11 m/uL    Hemoglobin 12.0 11.9 - 15.1 g/dL    Hematocrit 36.7 36.3 - 47.1 %    MCV 86.6 82.6 - 102.9 fL    MCH 28.3 25.2 - 33.5 pg    MCHC 32.7 28.4 - 34.8 g/dL    RDW 14.9 (H) 11.8 - 14.4 %    Platelets 675 768 - 306 k/uL    MPV 10.6 8.1 - 13.5 fL    NRBC Automated 0.0 0.0 per 100 WBC    Seg Neutrophils 73 (H) 36 - 65 %    Lymphocytes 16 (L) 24 - 43 %    Monocytes 10 3 - 12 %    Eosinophils % 0 (L) 1 - 4 %    Basophils 0 0 - 2 %    Immature Granulocytes 1 (H) 0 %    Segs Absolute 4.73 1.50 - 8.10 k/uL    Absolute Lymph # 1.03 (L) 1.10 - 3.70 k/uL    Absolute Mono # 0.66 0.10 - 1.20 k/uL    Absolute Eos # <0.03 0.00 - 0.44 k/uL    Basophils Absolute <0.03 0.00 - 0.20 k/uL    Absolute Immature Granulocyte 0.03 0.00 - 0.30 k/uL   Comprehensive Metabolic Panel w/ Reflex to MG Collection Time: 03/05/23  5:35 AM   Result Value Ref Range    Glucose 107 (H) 70 - 99 mg/dL    BUN 17 8 - 23 mg/dL    Creatinine 0.75 0.50 - 0.90 mg/dL    Est, Glom Filt Rate >60 >60 mL/min/1.73m2    Bun/Cre Ratio 23 (H) 9 - 20    Calcium 8.6 8.6 - 10.4 mg/dL    Sodium 139 135 - 144 mmol/L    Potassium 3.8 3.7 - 5.3 mmol/L    Chloride 107 98 - 107 mmol/L    CO2 24 20 - 31 mmol/L    Anion Gap 8 (L) 9 - 17 mmol/L    Alkaline Phosphatase 74 35 - 104 U/L    ALT 13 5 - 33 U/L    AST 21 <32 U/L    Total Bilirubin 0.2 (L) 0.3 - 1.2 mg/dL    Total Protein 5.5 (L) 6.4 - 8.3 g/dL    Albumin 2.7 (L) 3.5 - 5.2 g/dL    Albumin/Globulin Ratio 1.0 1.0 - 2.5             Current Facility-Administered Medications   Medication Dose Route Frequency Provider Last Rate Last Admin    albuterol (PROVENTIL) nebulizer solution 2.5 mg  2.5 mg Nebulization Q4H PRN Sourav Degroot MD        vancomycin Mynor Filter) intermittent dosing (placeholder)   Other RX Placeholder Lisseth Patel DPM        vancomycin (VANCOCIN) 750 mg in sodium chloride 0.9 % 250 mL IVPB  750 mg IntraVENous Q12H iLsseth Patel DPM   Stopped at 03/04/23 2235    mupirocin (BACTROBAN) 2 % ointment   Topical Daily Lisseth Patel DPM   Given at 03/04/23 6282    ipratropium-albuterol (DUONEB) nebulizer solution 1 ampule  1 ampule Inhalation TID Sourav Degroot MD        sodium chloride flush 0.9 % injection 5-40 mL  5-40 mL IntraVENous 2 times per day Lisseth Patel DPM        sodium chloride flush 0.9 % injection 5-40 mL  5-40 mL IntraVENous PRN Lisseth Patel DPM        0.9 % sodium chloride infusion   IntraVENous PRN Lisseth Patel DPM        enoxaparin (LOVENOX) injection 40 mg  40 mg SubCUTAneous Daily Lisseth Patel DPM   40 mg at 03/04/23 0901    acetaminophen (TYLENOL) tablet 650 mg  650 mg Oral Q6H PRN Gloestefani Patel DPM        Or    acetaminophen (TYLENOL) suppository 650 mg  650 mg Rectal Q6H PRN Lisseth Patel DPM        LORazepam (ATIVAN) injection 0.5 mg  0.5 mg IntraVENous Q6H PRN Mcclure Docker, DPM   0.5 mg at 03/05/23 0247    aspirin EC tablet 81 mg  81 mg Oral Daily Mcclure Docker, DPM   81 mg at 03/04/23 0900    atorvastatin (LIPITOR) tablet 20 mg  20 mg Oral Daily Mcclure Docker, DPM   20 mg at 03/04/23 0900    benzonatate (TESSALON) capsule 200 mg  200 mg Oral TID PRN Mcclure Docker, DPM        Vitamin D (CHOLECALCIFEROL) tablet 2,000 Units  2,000 Units Oral Daily Mcclure Docker, DPM   2,000 Units at 03/04/23 0903    guaiFENesin-dextromethorphan (ROBITUSSIN DM) 100-10 MG/5ML syrup 10 mL  10 mL Oral Q6H PRN Mcclure Docker, DPM        diphenhydrAMINE (BENADRYL) capsule 25 mg  25 mg Oral Q6H PRN Mcclure Docker, DPM        docusate sodium (COLACE) capsule 100 mg  100 mg Oral BID Mcclure Docker, DPM   100 mg at 03/04/23 0900    ferrous sulfate (IRON 325) tablet 325 mg  325 mg Oral Daily with breakfast Mcclure Docker, DPM   325 mg at 03/04/23 0900    hydroCHLOROthiazide (MICROZIDE) capsule 12.5 mg  12.5 mg Oral Daily Mcclure Docker, DPM   12.5 mg at 03/04/23 0900    lisinopril (PRINIVIL;ZESTRIL) tablet 10 mg  10 mg Oral Daily Mcclure Docker, DPM   10 mg at 03/04/23 0900    cetirizine (ZYRTEC) tablet 10 mg  10 mg Oral Daily Mcclure Docker, DPM   10 mg at 03/04/23 0900    magnesium hydroxide (MILK OF MAGNESIA) 400 MG/5ML suspension 30 mL  30 mL Oral Daily PRN Mcclure Docker, DPM        meloxicam (MOBIC) tablet 7.5 mg  7.5 mg Oral Daily Mcclure Docker, DPM        OLANZapine (ZYPREXA) tablet 15 mg  15 mg Oral Dinner Mcclure Docker, DPM   15 mg at 03/04/23 1706    pantoprazole (PROTONIX) tablet 40 mg  40 mg Oral QAM AC KIRIT GarciaM   40 mg at 03/04/23 0900    potassium chloride (KLOR-CON M) extended release tablet 10 mEq  10 mEq Oral TID Ren Mccarty DPM   10 mEq at 03/04/23 2015    promethazine (PHENERGAN) suppository 25 mg  25 mg Rectal Q6H PRN Ren Mccarty DPM        promethazine (PHENERGAN) tablet 25 mg  25 mg Oral Q6H PRN Ren Mccarty DPM        therapeutic multivitamin-minerals 1 tablet  1 tablet Oral Daily Kandee Noé, DPM   1 tablet at 03/04/23 0900    psyllium (METAMUCIL) 58.12 % packet 1 packet  1 packet Oral BID WC Kandee Noé, DPM   1 packet at 03/04/23 5024    traMADol (ULTRAM) tablet 50 mg  50 mg Oral Q6H PRN Kandee [place], DPM        ketorolac (TORADOL) injection 15 mg  15 mg IntraVENous Q6H PRN Kandee [place], DPM   15 mg at 03/04/23 1901    meropenem (MERREM) 1,000 mg in sodium chloride 0.9 % 100 mL IVPB (mini-bag)  1,000 mg IntraVENous Q8H Fabián Quintanilla MD 33.3 mL/hr at 03/05/23 0327 1,000 mg at 03/05/23 0327       ASSESSMENT:     Principal Problem:    Sepsis [facility])  Active Problems:    Hypertension    Intellectual disability    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[facility] [address]    Tardive dyskinesia    Cellulitis of right lower extremity  Resolved Problems:    * No resolved hospital problems. *      PLAN:     Primary Problem(s): Sepsis ([facility] [address]  Condition is stable  Treatment plan: Continue current treatment  Imaging: no further imaging studies ordered today  Medications: Continue current antibiotic: Meropenem/Vanc, pending cultures, ID recommendations  and Podiatry  MRSA negative  Monitor labs, leukocytosis is improving  Podiatry/ is following closely  Pain control as needed, resume Mobic  Continue w/ prior home meds  Dispo Pending clinical status [to TDC] soon      DVT prophylaxis: Lovenox 40 mg SC  GI prophylaxis: Protonix 40 mg daily    Discussed care plan with nurse after getting their input. Please note that this chart was generated using voice recognition Dragon dictation software. Although every effort was made to ensure the accuracy of this automated transcription, some errors in transcription may have occurred.     Ara Acosta MD  3/5/2023 7:11 AM

## 2023-03-06 VITALS
HEIGHT: 60 IN | TEMPERATURE: 98.5 F | OXYGEN SATURATION: 97 % | SYSTOLIC BLOOD PRESSURE: 140 MMHG | WEIGHT: 139.33 LBS | RESPIRATION RATE: 19 BRPM | DIASTOLIC BLOOD PRESSURE: 84 MMHG | BODY MASS INDEX: 27.35 KG/M2 | HEART RATE: 81 BPM

## 2023-03-06 LAB
ABSOLUTE EOS #: <0.03 K/UL (ref 0–0.44)
ABSOLUTE IMMATURE GRANULOCYTE: <0.03 K/UL (ref 0–0.3)
ABSOLUTE LYMPH #: 0.77 K/UL (ref 1.1–3.7)
ABSOLUTE MONO #: 0.41 K/UL (ref 0.1–1.2)
BASOPHILS # BLD: 0 % (ref 0–2)
BASOPHILS ABSOLUTE: <0.03 K/UL (ref 0–0.2)
EOSINOPHILS RELATIVE PERCENT: 0 % (ref 1–4)
HCT VFR BLD AUTO: 39.1 % (ref 36.3–47.1)
HGB BLD-MCNC: 12.8 G/DL (ref 11.9–15.1)
IMMATURE GRANULOCYTES: 0 %
LYMPHOCYTES # BLD: 15 % (ref 24–43)
MCH RBC QN AUTO: 27.9 PG (ref 25.2–33.5)
MCHC RBC AUTO-ENTMCNC: 32.7 G/DL (ref 28.4–34.8)
MCV RBC AUTO: 85.2 FL (ref 82.6–102.9)
MONOCYTES # BLD: 8 % (ref 3–12)
NRBC AUTOMATED: 0 PER 100 WBC
PDW BLD-RTO: 14.6 % (ref 11.8–14.4)
PLATELET # BLD AUTO: 221 K/UL (ref 138–453)
PMV BLD AUTO: 10 FL (ref 8.1–13.5)
RBC # BLD: 4.59 M/UL (ref 3.95–5.11)
SARS-COV-2 RDRP RESP QL NAA+PROBE: NOT DETECTED
SEG NEUTROPHILS: 77 % (ref 36–65)
SEGMENTED NEUTROPHILS ABSOLUTE COUNT: 3.96 K/UL (ref 1.5–8.1)
SPECIMEN DESCRIPTION: NORMAL
WBC # BLD AUTO: 5.2 K/UL (ref 3.5–11.3)

## 2023-03-06 PROCEDURE — 6370000000 HC RX 637 (ALT 250 FOR IP): Performed by: INTERNAL MEDICINE

## 2023-03-06 PROCEDURE — 85025 COMPLETE CBC W/AUTO DIFF WBC: CPT

## 2023-03-06 PROCEDURE — 36415 COLL VENOUS BLD VENIPUNCTURE: CPT

## 2023-03-06 PROCEDURE — C9803 HOPD COVID-19 SPEC COLLECT: HCPCS

## 2023-03-06 PROCEDURE — 6370000000 HC RX 637 (ALT 250 FOR IP): Performed by: PODIATRIST

## 2023-03-06 PROCEDURE — 94640 AIRWAY INHALATION TREATMENT: CPT

## 2023-03-06 PROCEDURE — 87635 SARS-COV-2 COVID-19 AMP PRB: CPT

## 2023-03-06 PROCEDURE — 6360000002 HC RX W HCPCS: Performed by: PODIATRIST

## 2023-03-06 PROCEDURE — 94761 N-INVAS EAR/PLS OXIMETRY MLT: CPT

## 2023-03-06 PROCEDURE — 6370000000 HC RX 637 (ALT 250 FOR IP): Performed by: STUDENT IN AN ORGANIZED HEALTH CARE EDUCATION/TRAINING PROGRAM

## 2023-03-06 RX ORDER — LACTOBACILLUS RHAMNOSUS GG 10B CELL
1 CAPSULE ORAL
DISCHARGE
Start: 2023-03-07

## 2023-03-06 RX ORDER — ALBUTEROL SULFATE 2.5 MG/3ML
2.5 SOLUTION RESPIRATORY (INHALATION) EVERY 4 HOURS PRN
Qty: 120 EACH | Refills: 3 | DISCHARGE
Start: 2023-03-06

## 2023-03-06 RX ORDER — IPRATROPIUM BROMIDE AND ALBUTEROL SULFATE 2.5; .5 MG/3ML; MG/3ML
3 SOLUTION RESPIRATORY (INHALATION) 3 TIMES DAILY
Qty: 360 ML | DISCHARGE
Start: 2023-03-06

## 2023-03-06 RX ORDER — CEPHALEXIN 500 MG/1
500 CAPSULE ORAL 4 TIMES DAILY
Qty: 28 CAPSULE | Refills: 0 | Status: SHIPPED | OUTPATIENT
Start: 2023-03-06 | End: 2023-03-13

## 2023-03-06 RX ADMIN — CETIRIZINE HYDROCHLORIDE 10 MG: 10 TABLET, FILM COATED ORAL at 08:59

## 2023-03-06 RX ADMIN — BENZONATATE 200 MG: 100 CAPSULE ORAL at 09:13

## 2023-03-06 RX ADMIN — PANTOPRAZOLE SODIUM 40 MG: 40 TABLET, DELAYED RELEASE ORAL at 08:59

## 2023-03-06 RX ADMIN — MELOXICAM 7.5 MG: 7.5 TABLET ORAL at 08:59

## 2023-03-06 RX ADMIN — POTASSIUM CHLORIDE 10 MEQ: 10 TABLET, EXTENDED RELEASE ORAL at 08:59

## 2023-03-06 RX ADMIN — CEPHALEXIN 500 MG: 500 CAPSULE ORAL at 12:05

## 2023-03-06 RX ADMIN — CEPHALEXIN 500 MG: 500 CAPSULE ORAL at 05:25

## 2023-03-06 RX ADMIN — Medication 2000 UNITS: at 08:59

## 2023-03-06 RX ADMIN — MULTIPLE VITAMINS W/ MINERALS TAB 1 TABLET: TAB at 08:59

## 2023-03-06 RX ADMIN — Medication 1 CAPSULE: at 08:59

## 2023-03-06 RX ADMIN — ASPIRIN 81 MG: 81 TABLET, COATED ORAL at 08:58

## 2023-03-06 RX ADMIN — CEPHALEXIN 500 MG: 500 CAPSULE ORAL at 00:46

## 2023-03-06 RX ADMIN — ATORVASTATIN CALCIUM 20 MG: 20 TABLET, FILM COATED ORAL at 08:58

## 2023-03-06 RX ADMIN — IPRATROPIUM BROMIDE AND ALBUTEROL SULFATE 1 AMPULE: 2.5; .5 SOLUTION RESPIRATORY (INHALATION) at 10:12

## 2023-03-06 RX ADMIN — FERROUS SULFATE TAB 325 MG (65 MG ELEMENTAL FE) 325 MG: 325 (65 FE) TAB at 08:59

## 2023-03-06 RX ADMIN — ENOXAPARIN SODIUM 40 MG: 100 INJECTION SUBCUTANEOUS at 08:57

## 2023-03-06 RX ADMIN — MUPIROCIN: 20 OINTMENT TOPICAL at 09:00

## 2023-03-06 RX ADMIN — LISINOPRIL 10 MG: 10 TABLET ORAL at 08:59

## 2023-03-06 RX ADMIN — DOCUSATE SODIUM 100 MG: 100 CAPSULE, LIQUID FILLED ORAL at 08:59

## 2023-03-06 RX ADMIN — HYDROCHLOROTHIAZIDE 12.5 MG: 12.5 CAPSULE ORAL at 08:59

## 2023-03-06 RX ADMIN — PSYLLIUM HUSK 1 PACKET: 3.4 POWDER ORAL at 08:59

## 2023-03-06 ASSESSMENT — PAIN SCALES - GENERAL: PAINLEVEL_OUTOF10: 0

## 2023-03-06 NOTE — PROGRESS NOTES
Nutrition Note    Dietary  reports Pt has been taking magic cups TID with meals. Modified order from BID to TID, resumed supplement order.      Electronically signed by Primitivo Barr RD, LI on 3/6/23 at 9:41 AM EST    Contact: [phone]

## 2023-03-06 NOTE — PROGRESS NOTES
Patient kept trying to hit breathing treatment away, and would not allow me to get close enough to administer medication at this time via blow by. Attempted multiple times, will continue to monitor.

## 2023-03-06 NOTE — PROGRESS NOTES
[facility]  Inpatient/Observation/Outpatient Rehabilitation    Date: 3/6/2023  Patient Name: Jane Porras       [x] Inpatient Acute/Observation       []  Outpatient  : 1950       [] Pt no showed for scheduled appointment    [] Pt refused/declined therapy at this time due to:           [x] Pt cancelled due to:  [] No Reason Given   [] Sick/ill   [] Other:per nurse pt is not appropriate at this time for PT eval. Pt is MRDD, does walk at [facility] but does not follow commands, screams and will punch and kick when agitated    Therapist/Assistant will attempt to see this patient, at our earliest opportunity.        Gianni Johns, PT Date: 3/6/2023

## 2023-03-06 NOTE — PLAN OF CARE
Problem: Discharge Planning  Goal: Discharge to home or other facility with appropriate resources  3/6/2023 1321 by Marcos Conklin RN  Outcome: Completed  3/6/2023 0223 by Nickolas Anguiano RN  Outcome: Progressing  Flowsheets (Taken 3/6/2023 0223)  Discharge to home or other facility with appropriate resources: Refer to discharge planning if patient needs post-hospital services based on physician order or complex needs related to functional status, cognitive ability or social support system     Problem: Safety - Adult  Goal: Free from fall injury  3/6/2023 1321 by Marcos Conklin RN  Outcome: Completed  3/6/2023 0223 by Nickolas Anguiano RN  Outcome: Progressing  Flowsheets (Taken 3/6/2023 0223)  Free From Fall Injury: Instruct family/caregiver on patient safety     Problem: Skin/Tissue Integrity  Goal: Absence of new skin breakdown  Description: 1. Monitor for areas of redness and/or skin breakdown  2. Assess vascular access sites hourly  3. Every 4-6 hours minimum:  Change oxygen saturation probe site  4. Every 4-6 hours:  If on nasal continuous positive airway pressure, respiratory therapy assess nares and determine need for appliance change or resting period. 3/6/2023 1321 by Marcos Conklin RN  Outcome: Completed  3/6/2023 0223 by Nickolas Anguiano RN  Outcome: Progressing  Note: No new skin issues noted.      Problem: Pain  Goal: Verbalizes/displays adequate comfort level or baseline comfort level  3/6/2023 1321 by Marcos Conklin RN  Outcome: Completed  3/6/2023 0223 by Nickolas Anguiano RN  Outcome: Progressing  Flowsheets (Taken 3/6/2023 0223)  Verbalizes/displays adequate comfort level or baseline comfort level:   Encourage patient to monitor pain and request assistance   Assess pain using appropriate pain scale   Administer analgesics based on type and severity of pain and evaluate response   Implement non-pharmacological measures as appropriate and evaluate response   Consider cultural and social influences on pain and pain management   Notify Licensed Independent Practitioner if interventions unsuccessful or patient reports new pain     Problem: Nutrition Deficit:  Goal: Optimize nutritional status  3/6/2023 1321 by Claudio Mitchell RN  Outcome: Completed  3/6/2023 0223 by Devora Warren RN  Outcome: Progressing  Flowsheets (Taken 3/6/2023 0223)  Nutrient intake appropriate for improving, restoring, or maintaining nutritional needs: Monitor oral intake, labs, and treatment plans     Problem: Skin/Tissue Integrity - Adult  Goal: Incisions, wounds, or drain sites healing without S/S of infection  3/6/2023 1321 by Claudio Mitchell RN  Outcome: Completed  3/6/2023 0223 by Devora Warren RN  Outcome: Progressing  Flowsheets (Taken 3/6/2023 0223)  Incisions, Wounds, or Drain Sites Healing Without Sign and Symptoms of Infection:   ADMISSION and DAILY: Assess and document risk factors for pressure ulcer development   TWICE DAILY: Assess and document skin integrity   TWICE DAILY: Assess and document dressing/incision, wound bed, drain sites and surrounding tissue     Problem: Musculoskeletal - Adult  Goal: Return mobility to safest level of function  3/6/2023 1321 by Claudio Mitchell RN  Outcome: Completed  3/6/2023 0223 by Devora Warren RN  Outcome: Progressing  Flowsheets (Taken 3/6/2023 0223)  Return Mobility to Safest Level of Function:   Assess patient stability and activity tolerance for standing, transferring and ambulating with or without assistive devices   Assist with transfers and ambulation using safe patient handling equipment as needed     Problem: Gastrointestinal - Adult  Goal: Maintains or returns to baseline bowel function  3/6/2023 1321 by Claudio Mitchell RN  Outcome: Completed  3/6/2023 0223 by Devora Warren RN  Outcome: Progressing  Flowsheets (Taken 3/6/2023 0223)  Maintains or returns to baseline bowel function:   Assess bowel function   Encourage mobilization and activity  Goal: Maintains adequate nutritional intake  3/6/2023 1321 by Verito Ahumada, RN  Outcome: Completed  3/6/2023 0223 by Magy Hernadez RN  Outcome: Progressing  Flowsheets (Taken 3/6/2023 0223)  Maintains adequate nutritional intake:   Monitor percentage of each meal consumed   Monitor intake and output, weight and lab values

## 2023-03-06 NOTE — PLAN OF CARE
Problem: Discharge Planning  Goal: Discharge to home or other facility with appropriate resources  Outcome: Progressing  Flowsheets (Taken 3/6/2023 0223)  Discharge to home or other facility with appropriate resources: Refer to discharge planning if patient needs post-hospital services based on physician order or complex needs related to functional status, cognitive ability or social support system     Problem: Safety - Adult  Goal: Free from fall injury  Outcome: Progressing  Flowsheets (Taken 3/6/2023 0223)  Free From Fall Injury: Instruct family/caregiver on patient safety     Problem: Skin/Tissue Integrity  Goal: Absence of new skin breakdown  Description: 1. Monitor for areas of redness and/or skin breakdown  2. Assess vascular access sites hourly  3. Every 4-6 hours minimum:  Change oxygen saturation probe site  4. Every 4-6 hours:  If on nasal continuous positive airway pressure, respiratory therapy assess nares and determine need for appliance change or resting period. Outcome: Progressing  Note: No new skin issues noted.      Problem: Pain  Goal: Verbalizes/displays adequate comfort level or baseline comfort level  Outcome: Progressing  Flowsheets (Taken 3/6/2023 0223)  Verbalizes/displays adequate comfort level or baseline comfort level:   Encourage patient to monitor pain and request assistance   Assess pain using appropriate pain scale   Administer analgesics based on type and severity of pain and evaluate response   Implement non-pharmacological measures as appropriate and evaluate response   Consider cultural and social influences on pain and pain management   Notify Licensed Independent Practitioner if interventions unsuccessful or patient reports new pain     Problem: Nutrition Deficit:  Goal: Optimize nutritional status  Outcome: Progressing  Flowsheets (Taken 3/6/2023 0223)  Nutrient intake appropriate for improving, restoring, or maintaining nutritional needs: Monitor oral intake, labs, and treatment plans     Problem: Skin/Tissue Integrity - Adult  Goal: Incisions, wounds, or drain sites healing without S/S of infection  Outcome: Progressing  Flowsheets (Taken 3/6/2023 0223)  Incisions, Wounds, or Drain Sites Healing Without Sign and Symptoms of Infection:   ADMISSION and DAILY: Assess and document risk factors for pressure ulcer development   TWICE DAILY: Assess and document skin integrity   TWICE DAILY: Assess and document dressing/incision, wound bed, drain sites and surrounding tissue     Problem: Musculoskeletal - Adult  Goal: Return mobility to safest level of function  Outcome: Progressing  Flowsheets (Taken 3/6/2023 0223)  Return Mobility to Safest Level of Function:   Assess patient stability and activity tolerance for standing, transferring and ambulating with or without assistive devices   Assist with transfers and ambulation using safe patient handling equipment as needed     Problem: Gastrointestinal - Adult  Goal: Maintains or returns to baseline bowel function  Outcome: Progressing  Flowsheets (Taken 3/6/2023 0223)  Maintains or returns to baseline bowel function:   Assess bowel function   Encourage mobilization and activity  Goal: Maintains adequate nutritional intake  Outcome: Progressing  Flowsheets (Taken 3/6/2023 0223)  Maintains adequate nutritional intake:   Monitor percentage of each meal consumed   Monitor intake and output, weight and lab values

## 2023-03-06 NOTE — DISCHARGE SUMMARY
Discharge Summary    Rhonda Segura  :  1950  MRN:  [id number]    Admit date:  3/2/2023      Discharge date: 3/6/2023     Admitting Physician:  Livia Barton MD    Discharge Diagnoses:    Principal Problem:    Sepsis [facility])  Active Problems:    Hypertension    Intellectual disability    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[facility] [address]    Tardive dyskinesia    Cellulitis of right lower extremity  Resolved Problems:    * No resolved hospital problems. *      Hospital Course:   Rhonda Segura is a 71 y.o. female admitted with sepsis. She presented to the emergency room from [facility] due to fever. Patient's fever started abruptly and was given Tylenol prior to arrival to the ER. Patient does have moderate MRDD and is minimally verbal.  No nausea or vomiting was reported. She is normally ambulatory. They do report that COVID-19 viruses are current throughout the facility. Upon arrival patient was alert but agitated with a temperature 104.1. Upon my examination today patient does appear dyspneic and ill. She is agitated with stimulation. During patient's hospitalization Dr. Jose Carlos Zambrano was consulted for right leg erythema and wound to right foot. She was taken to surgery for an I&D and cultures were taken which grew out strep group B and staph. Infectious disease was consulted. During her hospitalization she initiated on Ancef and transition to meropenem then was stopped and placed on cephalexin. She will continue this through 3/12. She does have sutures to the plantar surface of the right foot that do not have any erythema or drainage and are well approximated. Erythema to right leg is significantly improved. She is afebrile. She does continue to have wheezing and cough. She is given DuoNebs and albuterol which she will continue on discharge as well. Viral panel was completed during her hospitalization and was negative. I will have her get a CBC with differential and a CMP on .   Plan will be to discharge back to [facility] today and she will follow-up with Dr. Jose Carlos Zambrano as an outpatient. Consultants:   Dr. Jose Carlos Zambrano, podiatry; Dr. Dalton Elder, ID    Procedures: I&D: Right foot    Complications: none    Discharge Condition: fair    Exam:  GEN:    Awake, alert and in no distress  EYES:   EOMI, pupils equal   NECK: Supple. No lymphadenopathy. No carotid bruit  CVS:     regular rate and rhythm, no audible murmur  PULM:  diminished with bilateral expiratory wheezing, no acute respiratory distress  ABD:     Bowels sounds normal.  Abdomen is soft. No distention. no tenderness to palpation. EXT:     1+ edema in the RLE . No calf tenderness. Less erythema to right lower extremity. Plantar surface of right foot visible sutures in place no drainage no erythema surrounding  NEURO: Moves all extremities. Motor and sensory are grossly intact  SKIN:    No rashes. No skin lesions    Significant Diagnostic Studies:   Lab Results   Component Value Date    WBC 5.2 03/06/2023    HGB 12.8 03/06/2023     03/06/2023       Lab Results   Component Value Date    BUN 17 03/05/2023    CREATININE 0.75 03/05/2023     03/05/2023    K 3.8 03/05/2023    CALCIUM 8.6 03/05/2023     03/05/2023    CO2 24 03/05/2023    LABGLOM >60 03/05/2023       Lab Results   Component Value Date    WBCUA 0 TO 2 03/02/2023    RBCUA 2 TO 5 03/02/2023    EPITHUA 0 TO 2 03/02/2023    LEUKOCYTESUR NEGATIVE 03/02/2023    SPECGRAV 1.020 03/02/2023    GLUCOSEU NEGATIVE 03/02/2023    KETUA NEGATIVE 03/02/2023    PROTEINU NEGATIVE 03/02/2023    HGBUR NEGATIVE 03/02/2023    CASTUA NOT REPORTED 01/15/2019    CRYSTUA NOT REPORTED 01/15/2019    BACTERIA NOT REPORTED 01/15/2019    YEAST NOT REPORTED 01/15/2019       XR CHEST PORTABLE    Result Date: 3/2/2023  EXAMINATION: ONE XRAY VIEW OF THE CHEST 3/2/2023 9:13 pm COMPARISON: None.  HISTORY: ORDERING SYSTEM PROVIDED HISTORY: fever TECHNOLOGIST PROVIDED HISTORY: fever FINDINGS: Chest radiograph: The cardiomediastinal silhouette and hilar contours are prominent with congestion of bilateral mary. Increased interstitial markings likely suggestive of mild pulmonary edema. The lungs are otherwise clear with no focal consolidation, pleural effusion or pneumothorax. The overlying soft tissue and osseous structures do not demonstrate acute abnormality. Unchanged chronic fracture deformities of midthoracic vertebral bodies. Increased interstitial markings likely suggestive of mild pulmonary edema. CT TIBIA FIBULA RIGHT W CONTRAST    Result Date: 3/3/2023  EXAMINATION: CT OF THE RIGHT TIBIA AND FIBULA WITH CONTRAST 3/3/2023 10:31 am TECHNIQUE: CT of the right tibia and fibula was performed with the administration of intravenous contrast.  Multiplanar reformatted images are provided for review. Automated exposure control, iterative reconstruction, and/or weight based adjustment of the mA/kV was utilized to reduce the radiation dose to as low as reasonably achievable. COMPARISON: None. HISTORY ORDERING SYSTEM PROVIDED HISTORY: cellulitis / right foot area of induration // ?? abscess TECHNOLOGIST PROVIDED HISTORY: cellulitis / right foot area of induration // ?? abscess FINDINGS: Bones: No acute osseous abnormality. Remote healed fracture of the distal fibula. Postoperative changes partially imaged with surgical screws in the left distal tibia. Soft Tissue: Superficial soft tissue edema is present in the lateral distal leg. Superficial soft tissue edema in the dorsal foot at the level of the hindfoot and midfoot. Superficial soft tissue edema in the medial plantar midfoot. No discrete abscess or fluid collection. No intramuscular fluid collection or deep fascial fluid. Joint: No significant degenerative changes. No osseous erosions. Soft tissue edema in the superficial distal leg and hindfoot. No abscess.      CT CHEST ABDOMEN PELVIS W CONTRAST Additional Contrast? None    Result Date: 3/3/2023  EXAMINATION: CT OF THE CHEST, ABDOMEN, AND PELVIS WITH CONTRAST 3/2/2023 9:54 pm TECHNIQUE: CT of the chest, abdomen and pelvis was performed with the administration of intravenous contrast. Multiplanar reformatted images are provided for review. Automated exposure control, iterative reconstruction, and/or weight based adjustment of the mA/kV was utilized to reduce the radiation dose to as low as reasonably achievable. COMPARISON: None HISTORY: ORDERING SYSTEM PROVIDED HISTORY: Fever, leukocytosis, nonverbal TECHNOLOGIST PROVIDED HISTORY: Fever, leukocytosis, nonverbal Decision Support Exception - unselect if not a suspected or confirmed emergency medical condition->Emergency Medical Condition (MA) FINDINGS: Chest: Mediastinum: No evidence of mass or pathologic lymphadenopathy or acute process in the mediastinum. Thoracic aorta is of normal size, without evidence of aneurysm or dissection. In the opacified central pulmonary arteries and their central and paracentral branches there is no definable pulmonary embolism. No evidence of pericardial effusion or pericardial thickening. Evidence of coronary artery calcification. Lungs/pleura: Evidence of mild streaky atelectatic changes, with or without subtle infiltrates in the right pulmonary lower lobe. In the rest of both lungs, there is no other focal infiltrates or atelectatic changes. There is mild pulmonary vascular congestion. The interstitial markings are hazy and therefore interstitial pulmonary edema is not excluded. No evidence of pneumothorax. Soft Tissues/Bones: In the thoracic spine, evidence of moderate kyphosis and moderate multilevel degenerative disc disease without definable fracture. Sternum is intact. In visualized bilateral ribs there is no definable fracture. Abdomen/Pelvis: Organs: No evidence of pneumoperitoneum. There is small to moderate size retrocardiac hiatal hernia.  In the lower lateral portion of right hepatic lobe, there is hypodense nonenhancing lesion measuring 2.3 cm in diameter. In the upper most portion of right hepatic lobe there is a hypodense nonenhancing lesion measuring 1.6 cm in diameter. In the upper portion of right hepatic lobe there is another hypodense nonenhancing lesion measuring 0.8 cm. At the inferior aspect of right hepatic lobe there is a tiny nonenhancing lesion measuring 0.55 cm. At the inferior aspect of left medial segment of liver there is a tiny nonenhancing lesion measuring 0.4 cm. In the midportion right hepatic lobe there is nonenhancing lesion measuring 0.45 cm. These multiple nonenhancing hepatic lesions are most likely to be hepatic cysts. Normal size of spleen with a few tiny calcified granulomas. Normal bilateral adrenal glands. Moderate to markedly atrophic pancreas without focal abnormality and without any acute process. In the right and left kidney there are a few tiny subcentimeter cortical cysts without any other mass. No evidence of stone or hydronephrosis in either kidney. There is no obstructive uropathy. Bladder is mildly distended without evidence of stone or focal abnormality. GI/Bowel: No diagnostic finding in visualized stomach. Small to moderate amount of gas scattered in proximal and mid small bowel and minimal gas scattered in distal small bowel. Mild increased fluid contents in the distal small bowel. There are a few small fluid levels in small bowel. No obvious small bowel wall thickening. Normal appendix posteroinferior to cecum. Moderate amount of stool and small amount of gas are present in the right colon and transverse colon. Descending colon contains small amount of stool and gas. In the sigmoid colon small amount of stool and gas are scattered. In rectum moderate amount of stool and gas are present. No perirectal abnormality. Evidence of moderate to marked diverticulosis of sigmoid colon without evidence of diverticulitis. No evidence of colitis. Pelvis: No abnormal fluid collection or focal inflammatory process in the pelvis. No pelvic mass or pathologic lymphadenopathy. Peritoneum/Retroperitoneum: Abdominal aorta is of normal size with mild calcified plaques, without aortic dissection. No evidence of periaortic or mesenteric pathologic lymphadenopathy. Normally opacified patent celiac artery and branches and SMA and branches. GA is also patent. Bilateral renal arteries are grossly normally patent. No evidence of ascites. Normally patent and opacified portal vein, the splenic vein and the SMV. Bones/Soft Tissues: Evidence of severe dextrorotatory scoliosis in lumbar spine with multilevel moderate to severe degenerative disc disease. At L5-S1 level there is grade 2 spondylolisthesis with bilateral pars defects and advanced DDD and facet osteoarthritis. At L1-L2 level moderate degenerative disc disease with moderate compression of upper aspect of L2 vertebral body, probably chronic. No evidence of inguinal, femoral or ventral hernia. Evidence of previous internal fixation of fracture in the proximal right femur. Mild dependent atelectatic changes at the posterior lung bases bilaterally. Otherwise no confluent pneumonia or pulmonary atelectasis. No pleural effusion or pneumothorax. Minimal pulmonary vascular congestion. Probable mild interstitial pulmonary edema in lungs. No acute process in the mediastinum. Normal thoracic aorta. No evidence of pulmonary embolism in the visualized central pulmonary arteries, central branches and paracentral branches of pulmonary arteries. There is small to moderate retrocardiac hiatal hernia. There are multiple hypodense nonenhancing lesions scattered in liver most likely to be multiple hepatic cysts. No diagnostic finding in gallbladder, spleen, and adrenal glands. Moderate to markedly atrophic pancreas without any acute process. No renal stone or obstructive uropathy. Normal appendix visualized.  Small amount of increased fluid and small amount of gas scattered in small bowel with few fluid levels. Probable acute enteritis. Moderate amount of stool present throughout most of colon. Moderate to marked sigmoid diverticulosis, without diverticulitis. All mesenteric arteries and major branches are patent and opacified. Marked lumbar dextroscoliosis with moderate to marked multilevel spondylotic changes. Moderate compression of upper aspect of L2 vertebral body, probably chronic. Assessment and Plan:  Patient Active Problem List    Diagnosis Date Noted    Sepsis ([facility] [address] 03/03/2023    Hypertension 03/03/2023    Intellectual disability 03/03/2023    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 03/03/2023    Tardive dyskinesia 03/03/2023    Cellulitis of right lower extremity 03/03/2023        Discharge Medications:         Medication List        START taking these medications      albuterol (2.5 MG/3ML) 0.083% nebulizer solution  Commonly known as: PROVENTIL  Take 3 mLs by nebulization every 4 hours as needed for Wheezing     cephALEXin 500 MG capsule  Commonly known as: KEFLEX  Take 1 capsule by mouth 4 times daily for 7 days     ipratropium-albuterol 0.5-2.5 (3) MG/3ML Soln nebulizer solution  Commonly known as: DUONEB  Inhale 3 mLs into the lungs in the morning, at noon, and at bedtime     lactobacillus capsule  Take 1 capsule by mouth daily (with breakfast)  Start taking on: March 7, 2023     mupirocin 2 % ointment  Commonly known as: BACTROBAN  Apply topically 2 times daily for 14 days Apply topically 3 times daily.             CONTINUE taking these medications      acetaminophen 325 MG tablet  Commonly known as: TYLENOL     aluminum & magnesium hydroxide-simethicone 200-200-20 MG/5ML Susp suspension  Commonly known as: MAALOX     aspirin 81 MG tablet     atorvastatin 20 MG tablet  Commonly known as: LIPITOR     Benefiber Powd     benzonatate 200 MG capsule  Commonly known as: TESSALON     Dextromethorphan-guaiFENesin  MG/5ML Syrp     diphenhydrAMINE 25 MG capsule  Commonly known as: BENADRYL     docusate sodium 100 MG capsule  Commonly known as: COLACE     ferrous sulfate 325 (65 Fe) MG tablet  Commonly known as: IRON 325     hydroCHLOROthiazide 12.5 MG capsule  Commonly known as: MICROZIDE     lisinopril 5 MG tablet  Commonly known as: PRINIVIL;ZESTRIL     loratadine 10 MG tablet  Commonly known as: CLARITIN     magnesium hydroxide 400 MG/5ML suspension  Commonly known as: MILK OF MAGNESIA     meloxicam 7.5 MG tablet  Commonly known as: MOBIC     OLANZapine 15 MG tablet  Commonly known as: ZYPREXA     omeprazole 20 MG delayed release capsule  Commonly known as: PRILOSEC     petrolatum, lanolin, 8-hydroxyquinoline sulfate Oint     potassium chloride 10 MEQ extended release tablet  Commonly known as: KLOR-CON M     * promethazine 25 MG suppository  Commonly known as: PHENERGAN     * promethazine 25 MG tablet  Commonly known as: PHENERGAN     therapeutic multivitamin-minerals tablet     traMADol 50 MG tablet  Commonly known as: ULTRAM     triazolam 0.25 MG tablet  Commonly known as: HALCION     Vitamin D3 50 MCG (2000 UT) Caps           * This list has 2 medication(s) that are the same as other medications prescribed for you. Read the directions carefully, and ask your doctor or other care provider to review them with you.                STOP taking these medications      calcium carbonate 500 MG Tabs tablet  Commonly known as: OSCAL     fluorouracil 5 % cream  Commonly known as: EFUDEX     imiquimod 5 % cream  Commonly known as: ALDARA     neomycin-bacitracin-polymyxin 400-5-5000 ointment  Commonly known as: NEOSPORIN               Where to Get Your Medications        These medications were sent to [facility] P [phone] - F [phone]  [facility] [address]      Phone: [phone]   mupirocin 2 % ointment       You can get these  medications from any pharmacy    Bring a paper prescription for each of these medications  cephALEXin 500 MG capsule       Information about where to get these medications is not yet available    Ask your nurse or doctor about these medications  albuterol (2.5 MG/3ML) 0.083% nebulizer solution  ipratropium-albuterol 0.5-2.5 (3) MG/3ML Soln nebulizer solution  lactobacillus capsule         Patient Instructions:    Activity: activity as tolerated  Diet: regular diet  Wound Care: keep wound clean and dry  Other: CBC with differential and CMP on 3/9    Disposition:   DC to [place] [address]    Follow up:  Patient will be followed by Trina Mathis MD in 1-2 weeks    [facility] on Discharge (if applicable)  ACE/ARB in CHF: NA  Statin in MI: NA  ASA in MI: NA  Statin in CVA: NA  Antiplatelet in CVA: NA    Total time spent on discharge services: 40 minutes    Including the following activities:  Evaluation and Management of patient  Discussion with patient and/or surrogate about current care plan  Coordination with Case Management and/or   Coordination of care with Consultants (if applicable)   Coordination of care with Receiving Facility Physician (if applicable)  Completion of DME forms (if applicable)  Preparation of Discharge Summary  Preparation of Medication Reconciliation  Preparation of Discharge Prescriptions    Signed:  MIGUELINA Ferraro - CNP, MIGUELINA, NP-C  3/6/2023, 10:52 AM

## 2023-03-06 NOTE — PROGRESS NOTES
Patient noted to be swinging at staff. Patient attempting to bite writer. Vitals taken and documented. See flow sheet for details. Assessment completed and documented. Patient oriented only to self. Incomprehensible speech noted. End expiratory wheezes noted throughout. Noted strong, non productive cough. Abdomen round, soft, non tender to palpation. Bowel sounds active in all four quadrants. +1 pitting edema noted in bilateral lower extremities. Nonpitting edema noted in LUE. Scattered ecchymosis noted. Noted redness on right lower extremity. Patient denies of pain, chest pain, numbness, tingling, or shortness of breath. Patient denies needs or concerns at this time. Call light and over bed table in reach. Side rails up times two.

## 2023-03-06 NOTE — PROGRESS NOTES
Writer to bedside to complete morning assessment. Upon entry to room, pt awake in bed, respirations unlabored while on room air. Vitals obtained and assessment completed, see flow sheet for details. Pt non-verbal, incomprehensible speech. Writer at bedside with Mayur Orozco RN and Maddy MAO to assist in cleaning up pt and pt is hitting, trying to kick and yelling continuously. Writer gave morning medications in pudding. Shortly after giving morning medications pt threw up a small amount at this time. Pt cleaned up at this time. New dressing placed on pt's right foot and sock placed overtop dressing to prevent pt from pulling dressing off. Pt showed no more signs of getting sick again and pt ate a couple bites of pudding without problem. Call light in reach, bed alarm on. Care ongoing.

## 2023-03-06 NOTE — PROGRESS NOTES
Reassessment completed at this time. Vitals taken and documented. Patient encouraged to ask questions. Patient denies pain or complaints. Call light and bed side table within reach. Side rails up times two. Medication given at this time per orders.

## 2023-03-06 NOTE — PROGRESS NOTES
Patient is discharge back to [facility] today. The guardian and [facility] [place] aware of the discharge. Wallace Gonzalez will set up the transportation. Discharge paper work done and fax to [facility] [place].     LIZY Alvarez

## 2023-03-06 NOTE — PROGRESS NOTES
Progress Note    SUBJECTIVE:    Patient seen for f/u of Sepsis ([address] She is in bed alert pleasant with audible wheezing. No concerns per nursing. Afebrile. ROS: Unable to due to severe MRDD     All other systems were reviewed with the patient and are negative unless otherwise stated in HPI      OBJECTIVE:      Vitals:   Vitals:    03/06/23 0845   BP: (!) 140/84   Pulse: 81   Resp: 19   Temp: 98.5 °F (36.9 °C)   SpO2: 97%     Weight: 139 lb 5.3 oz (63.2 kg)   Height: 5' (152.4 cm)     Weight  Wt Readings from Last 3 Encounters:   03/06/23 139 lb 5.3 oz (63.2 kg)   07/29/19 145 lb (65.8 kg)   10/15/15 130 lb (59 kg)     Body mass index is 27.21 kg/m². 24HR INTAKE/OUTPUT:      Intake/Output Summary (Last 24 hours) at 3/6/2023 1039  Last data filed at 3/6/2023 0845  Gross per 24 hour   Intake 1540 ml   Output --   Net 1540 ml     -----------------------------------------------------------------  Exam:    GEN:    Awake, alert and in no distress  EYES:  EOMI, pupils equal   NECK: Supple. No lymphadenopathy. No carotid bruit  CVS:    regular rate and rhythm, no audible murmur  PULM:  diminished with bilateral expiratory wheezing, no acute respiratory distress  ABD:    Bowels sounds normal.  Abdomen is soft. No distention. no tenderness to palpation. EXT:   1+ edema in the RLE . No calf tenderness. Less erythema to right lower extremity. Plantar surface of right foot visible sutures in place no drainage no erythema surrounding  NEURO: Moves all extremities. Motor and sensory are grossly intact  SKIN:  No rashes.   No skin lesions.    -----------------------------------------------------------------    Diagnostic Data:      Complete Blood Count:   Recent Labs     03/04/23  0545 03/05/23  0535 03/06/23  0550   WBC 11.4* 6.5 5.2   RBC 4.35 4.24 4.59   HGB 12.4 12.0 12.8   HCT 37.8 36.7 39.1   MCV 86.9 86.6 85.2   MCH 28.5 28.3 27.9   MCHC 32.8 32.7 32.7   RDW 15.0* 14.9* 14.6*    175 221   MPV 10.7 10.6 10.0        Last 3 Blood Glucose:   Recent Labs     03/04/23  0545 03/05/23  0535   GLUCOSE 120* 107*        Comprehensive Metabolic Profile:   Recent Labs     03/04/23  0545 03/05/23  0535    139   K 3.8 3.8   * 107   CO2 21 24   BUN 17 17   CREATININE 0.62 0.75   GLUCOSE 120* 107*   CALCIUM 8.5* 8.6   PROT 5.6* 5.5*   LABALBU 3.0* 2.7*   BILITOT 0.2* 0.2*   ALKPHOS 84 74   AST 18 21   ALT 13 13        Urinalysis:   Lab Results   Component Value Date/Time    NITRU NEGATIVE 03/02/2023 10:08 PM    COLORU Yellow 03/02/2023 10:08 PM    PHUR 6.0 03/02/2023 10:08 PM    WBCUA 0 TO 2 03/02/2023 10:08 PM    RBCUA 2 TO 5 03/02/2023 10:08 PM    MUCUS 1+ 03/02/2023 10:08 PM    TRICHOMONAS NOT REPORTED 01/15/2019 06:35 PM    YEAST NOT REPORTED 01/15/2019 06:35 PM    BACTERIA NOT REPORTED 01/15/2019 06:35 PM    SPECGRAV 1.020 03/02/2023 10:08 PM    LEUKOCYTESUR NEGATIVE 03/02/2023 10:08 PM    UROBILINOGEN Normal 03/02/2023 10:08 PM    [facility] NEGATIVE 03/02/2023 10:08 PM    GLUCOSEU NEGATIVE 03/02/2023 10:08 PM    KETUA NEGATIVE 03/02/2023 10:08 PM    AMORPHOUS NOT REPORTED 01/15/2019 06:35 PM       HgBA1c:  No results found for: LABA1C    Lactic Acid:   Lab Results   Component Value Date/Time    LACTA 1.2 03/03/2023 12:07 AM        Troponin: No results for input(s): TROPONINI in the last 72 hours. CRP:  No results for input(s): CRP in the last 72 hours. Radiology/Imaging:  XR CHEST PORTABLE   Final Result   1. No acute cardiopulmonary process identified. XR ABDOMEN (KUB) (SINGLE AP VIEW)   Final Result   Normal bowel gas pattern without acute abnormality. CT TIBIA FIBULA RIGHT W CONTRAST   Preliminary Result   Soft tissue edema in the superficial distal leg and hindfoot. No abscess. CT CHEST ABDOMEN PELVIS W CONTRAST Additional Contrast? None   Final Result   Mild dependent atelectatic changes at the posterior lung bases bilaterally.    Otherwise no confluent pneumonia or pulmonary atelectasis. No pleural   effusion or pneumothorax. Minimal pulmonary vascular congestion. Probable   mild interstitial pulmonary edema in lungs. No acute process in the mediastinum. Normal thoracic aorta. No evidence of   pulmonary embolism in the visualized central pulmonary arteries, central   branches and paracentral branches of pulmonary arteries. There is small to moderate retrocardiac hiatal hernia. There are multiple hypodense nonenhancing lesions scattered in liver most   likely to be multiple hepatic cysts. No diagnostic finding in gallbladder, spleen, and adrenal glands. Moderate   to markedly atrophic pancreas without any acute process. No renal stone or   obstructive uropathy. Normal appendix visualized. Small amount of increased fluid and small amount of gas scattered in small   bowel with few fluid levels. Probable acute enteritis. Moderate amount of stool present throughout most of colon. Moderate to   marked sigmoid diverticulosis, without diverticulitis. All mesenteric arteries and major branches are patent and opacified. Marked lumbar dextroscoliosis with moderate to marked multilevel spondylotic   changes. Moderate compression of upper aspect of L2 vertebral body, probably   chronic. XR CHEST PORTABLE   Final Result      Increased interstitial markings likely suggestive of mild pulmonary edema.                ASSESSMENT / PLAN:    MEDICAL DECISION MAKING:    Primary Problem(s): Sepsis ([facility] [address]  Differential diagnoses: Sepsis  Condition is an undiagnosed new problem with uncertain prognosis  Condition is stable  Treatment plan: Consultation: Infectious disease, podiatry  Imaging: CT right lower extremity ordered  Medications:   Stop Cipro and Flagyl  Stop Ancef per ID  Stop meropenem  Stop vancomycin  Cephalexin 500 mg 4 times daily through 3/12/2023  Continue IV fluids  Medication Monitoring / High Risk Medications: Vancomycin Cultures x2-staff coag negative; beta-hemolytic group be strep  Status post I&D day 3     Cellulitis right lower extremity  Condition is stable  Treatment plan: Consultation: Podiatry, infectious disease  Imaging: CT right lower leg ordered-negative for abscess  Medications:   Above        Nutrition status:   at risk for malnutrition  Dietician consult initiated     Hospital Prophylaxis:   DVT: Lovenox   Stress Ulcer: PPI     Disposition:  Shared decision making:  No family at bedside  Social determinants of health that may impact management:  Resides at [facility]  Code status: Full Code   Disposition: Discharge plan is back to [facility]    MIPS Advanced Care Planning documentation:  [x] I have confirmed that the patient's Advance Care Plan is present, Code Status is documented, or surrogate decision maker is listed in the patient's medical record  [If \"yes\", STOP HERE]     [] The patient's Advance Care Plan is NOT present because:    []  I confirmed today that the patient does not wish or was not able to name a   surrogate decision maker or provide and advance care plan.    [] Hospice care is currently being provided or has been provided within the   calendar year. []  I did NOT confirm today the presence of an [address] or surrogate   decision maker documented within the patient's medical record.    [DOES NOT SATISFY MIGUELINA Jansen - CNP , MIGUELINA, NP-C  Hospitalist Medicine        3/6/2023, 10:39 AM

## 2023-03-07 LAB
MICROORGANISM SPEC CULT: NORMAL
MICROORGANISM SPEC CULT: NORMAL
SERVICE CMNT-IMP: NORMAL
SERVICE CMNT-IMP: NORMAL
SPECIMEN DESCRIPTION: NORMAL
SPECIMEN DESCRIPTION: NORMAL

## 2023-03-08 LAB
MICROORGANISM SPEC CULT: ABNORMAL
MICROORGANISM/AGENT SPEC: ABNORMAL
MICROORGANISM/AGENT SPEC: ABNORMAL
SPECIMEN DESCRIPTION: ABNORMAL

## 2023-03-09 ENCOUNTER — HOSPITAL ENCOUNTER (OUTPATIENT)
Age: 73
Setting detail: SPECIMEN
Discharge: HOME | End: 2023-03-09
Payer: MEDICARE

## 2023-03-09 LAB
ABSOLUTE EOS #: <0.03 K/UL (ref 0–0.44)
ABSOLUTE IMMATURE GRANULOCYTE: 0.09 K/UL (ref 0–0.3)
ABSOLUTE LYMPH #: 1.07 K/UL (ref 1.1–3.7)
ABSOLUTE MONO #: 0.51 K/UL (ref 0.1–1.2)
ALBUMIN SERPL-MCNC: 3.8 G/DL (ref 3.5–5.2)
ALBUMIN/GLOBULIN RATIO: 1.1 (ref 1–2.5)
ALP SERPL-CCNC: 94 U/L (ref 35–104)
ALT SERPL-CCNC: 66 U/L (ref 5–33)
ANION GAP SERPL CALCULATED.3IONS-SCNC: 10 MMOL/L (ref 9–17)
AST SERPL-CCNC: 55 U/L
BASOPHILS # BLD: 0 % (ref 0–2)
BASOPHILS ABSOLUTE: 0.03 K/UL (ref 0–0.2)
BILIRUB SERPL-MCNC: 0.4 MG/DL (ref 0.3–1.2)
BUN SERPL-MCNC: 18 MG/DL (ref 8–23)
BUN/CREAT BLD: 24 (ref 9–20)
CALCIUM SERPL-MCNC: 9.5 MG/DL (ref 8.6–10.4)
CHLORIDE SERPL-SCNC: 101 MMOL/L (ref 98–107)
CO2 SERPL-SCNC: 26 MMOL/L (ref 20–31)
CREAT SERPL-MCNC: 0.75 MG/DL (ref 0.5–0.9)
EOSINOPHILS RELATIVE PERCENT: 0 % (ref 1–4)
GFR SERPL CREATININE-BSD FRML MDRD: >60 ML/MIN/1.73M2
GLUCOSE SERPL-MCNC: 91 MG/DL (ref 70–99)
HCT VFR BLD AUTO: 44.3 % (ref 36.3–47.1)
HGB BLD-MCNC: 14.7 G/DL (ref 11.9–15.1)
IMMATURE GRANULOCYTES: 1 %
LYMPHOCYTES # BLD: 14 % (ref 24–43)
MCH RBC QN AUTO: 28.4 PG (ref 25.2–33.5)
MCHC RBC AUTO-ENTMCNC: 33.2 G/DL (ref 28.4–34.8)
MCV RBC AUTO: 85.5 FL (ref 82.6–102.9)
MONOCYTES # BLD: 6 % (ref 3–12)
NRBC AUTOMATED: 0 PER 100 WBC
PDW BLD-RTO: 14.6 % (ref 11.8–14.4)
PLATELET # BLD AUTO: 345 K/UL (ref 138–453)
PMV BLD AUTO: 10.4 FL (ref 8.1–13.5)
POTASSIUM SERPL-SCNC: 4.2 MMOL/L (ref 3.7–5.3)
PROT SERPL-MCNC: 7.2 G/DL (ref 6.4–8.3)
RBC # BLD: 5.18 M/UL (ref 3.95–5.11)
SEG NEUTROPHILS: 79 % (ref 36–65)
SEGMENTED NEUTROPHILS ABSOLUTE COUNT: 6.24 K/UL (ref 1.5–8.1)
SODIUM SERPL-SCNC: 137 MMOL/L (ref 135–144)
WBC # BLD AUTO: 7.9 K/UL (ref 3.5–11.3)

## 2023-03-09 PROCEDURE — P9603 ONE-WAY ALLOW PRORATED MILES: HCPCS

## 2023-03-09 PROCEDURE — 85025 COMPLETE CBC W/AUTO DIFF WBC: CPT

## 2023-03-09 PROCEDURE — 36415 COLL VENOUS BLD VENIPUNCTURE: CPT

## 2023-03-09 PROCEDURE — 80053 COMPREHEN METABOLIC PANEL: CPT

## 2023-05-16 ENCOUNTER — HOSPITAL ENCOUNTER (OUTPATIENT)
Dept: WOMENS IMAGING | Age: 73
Discharge: HOME | End: 2023-05-18
Payer: MEDICARE

## 2023-05-16 DIAGNOSIS — Z12.31 ENCOUNTER FOR SCREENING MAMMOGRAM FOR MALIGNANT NEOPLASM OF BREAST: ICD-10-CM

## 2023-05-16 PROCEDURE — 77063 BREAST TOMOSYNTHESIS BI: CPT

## 2023-07-11 ENCOUNTER — HOSPITAL ENCOUNTER (OUTPATIENT)
Age: 73
Setting detail: SPECIMEN
Discharge: HOME | End: 2023-07-11
Payer: MEDICARE

## 2023-07-11 LAB
ANION GAP SERPL CALCULATED.3IONS-SCNC: 8 MMOL/L (ref 9–17)
BNP SERPL-MCNC: 93 PG/ML
BUN SERPL-MCNC: 24 MG/DL (ref 8–23)
BUN/CREAT SERPL: 34 (ref 9–20)
CALCIUM SERPL-MCNC: 9.5 MG/DL (ref 8.6–10.4)
CHLORIDE SERPL-SCNC: 101 MMOL/L (ref 98–107)
CO2 SERPL-SCNC: 27 MMOL/L (ref 20–31)
CREAT SERPL-MCNC: 0.7 MG/DL (ref 0.5–0.9)
GFR SERPL CREATININE-BSD FRML MDRD: >60 ML/MIN/1.73M2
GLUCOSE SERPL-MCNC: 92 MG/DL (ref 70–99)
POTASSIUM SERPL-SCNC: 4.4 MMOL/L (ref 3.7–5.3)
SODIUM SERPL-SCNC: 136 MMOL/L (ref 135–144)

## 2023-07-11 PROCEDURE — 80048 BASIC METABOLIC PNL TOTAL CA: CPT

## 2023-07-11 PROCEDURE — 83880 ASSAY OF NATRIURETIC PEPTIDE: CPT

## 2023-07-11 PROCEDURE — 36415 COLL VENOUS BLD VENIPUNCTURE: CPT

## 2023-07-11 PROCEDURE — P9604 ONE-WAY ALLOW PRORATED TRIP: HCPCS

## 2023-07-18 ENCOUNTER — HOSPITAL ENCOUNTER (OUTPATIENT)
Age: 73
Setting detail: SPECIMEN
Discharge: HOME | End: 2023-07-18
Payer: MEDICARE

## 2023-07-18 LAB
ALBUMIN SERPL-MCNC: 4.1 G/DL (ref 3.5–5.2)
ALBUMIN/GLOB SERPL: 1.1 {RATIO} (ref 1–2.5)
ALP SERPL-CCNC: 113 U/L (ref 35–104)
ALT SERPL-CCNC: 15 U/L (ref 5–33)
ANION GAP SERPL CALCULATED.3IONS-SCNC: 9 MMOL/L (ref 9–17)
AST SERPL-CCNC: 16 U/L
BASOPHILS # BLD: <0.03 K/UL (ref 0–0.2)
BASOPHILS NFR BLD: 0 % (ref 0–2)
BILIRUB SERPL-MCNC: 0.2 MG/DL (ref 0.3–1.2)
BUN SERPL-MCNC: 25 MG/DL (ref 8–23)
BUN/CREAT SERPL: 36 (ref 9–20)
CALCIUM SERPL-MCNC: 9.6 MG/DL (ref 8.6–10.4)
CHLORIDE SERPL-SCNC: 102 MMOL/L (ref 98–107)
CHOLEST SERPL-MCNC: 171 MG/DL
CHOLESTEROL/HDL RATIO: 3.1
CO2 SERPL-SCNC: 25 MMOL/L (ref 20–31)
CREAT SERPL-MCNC: 0.7 MG/DL (ref 0.5–0.9)
EOSINOPHIL # BLD: <0.03 K/UL (ref 0–0.44)
EOSINOPHILS RELATIVE PERCENT: 0 % (ref 1–4)
ERYTHROCYTE [DISTWIDTH] IN BLOOD BY AUTOMATED COUNT: 14 % (ref 11.8–14.4)
GFR SERPL CREATININE-BSD FRML MDRD: >60 ML/MIN/1.73M2
GLUCOSE SERPL-MCNC: 95 MG/DL (ref 70–99)
HCT VFR BLD AUTO: 47.6 % (ref 36.3–47.1)
HDLC SERPL-MCNC: 55 MG/DL
HGB BLD-MCNC: 14.9 G/DL (ref 11.9–15.1)
IMM GRANULOCYTES # BLD AUTO: <0.03 K/UL (ref 0–0.3)
IMM GRANULOCYTES NFR BLD: 0 %
LDLC SERPL CALC-MCNC: 99 MG/DL (ref 0–130)
LYMPHOCYTES # BLD: 23 % (ref 24–43)
LYMPHOCYTES NFR BLD: 1.13 K/UL (ref 1.1–3.7)
MCH RBC QN AUTO: 27.4 PG (ref 25.2–33.5)
MCHC RBC AUTO-ENTMCNC: 31.3 G/DL (ref 28.4–34.8)
MCV RBC AUTO: 87.7 FL (ref 82.6–102.9)
MONOCYTES NFR BLD: 0.35 K/UL (ref 0.1–1.2)
MONOCYTES NFR BLD: 7 % (ref 3–12)
NEUTROPHILS NFR BLD: 70 % (ref 36–65)
NEUTS SEG NFR BLD: 3.33 K/UL (ref 1.5–8.1)
NRBC BLD-RTO: 0 PER 100 WBC
PLATELET # BLD AUTO: 259 K/UL (ref 138–453)
PMV BLD AUTO: 10.6 FL (ref 8.1–13.5)
POTASSIUM SERPL-SCNC: 4.1 MMOL/L (ref 3.7–5.3)
PROT SERPL-MCNC: 7.7 G/DL (ref 6.4–8.3)
RBC # BLD AUTO: 5.43 M/UL (ref 3.95–5.11)
SODIUM SERPL-SCNC: 136 MMOL/L (ref 135–144)
TRIGL SERPL-MCNC: 86 MG/DL
WBC OTHER # BLD: 4.8 K/UL (ref 3.5–11.3)

## 2023-07-18 PROCEDURE — 80061 LIPID PANEL: CPT

## 2023-07-18 PROCEDURE — 36415 COLL VENOUS BLD VENIPUNCTURE: CPT

## 2023-07-18 PROCEDURE — 80053 COMPREHEN METABOLIC PANEL: CPT

## 2023-07-18 PROCEDURE — 85027 COMPLETE CBC AUTOMATED: CPT

## 2023-08-14 ENCOUNTER — HOSPITAL ENCOUNTER (OUTPATIENT)
Age: 73
Discharge: HOME | End: 2023-08-14

## 2023-08-15 LAB
EKG ATRIAL RATE: 80 BPM
EKG P AXIS: 74 DEGREES
EKG P-R INTERVAL: 150 MS
EKG Q-T INTERVAL: 362 MS
EKG QRS DURATION: 84 MS
EKG QTC CALCULATION (BAZETT): 417 MS
EKG R AXIS: 53 DEGREES
EKG T AXIS: -33 DEGREES
EKG VENTRICULAR RATE: 80 BPM

## 2024-01-08 ENCOUNTER — HOSPITAL ENCOUNTER (OUTPATIENT)
Age: 74
Discharge: HOME | End: 2024-01-08
Payer: MEDICARE

## 2024-01-08 LAB
EKG ATRIAL RATE: 76 BPM
EKG P AXIS: 50 DEGREES
EKG P-R INTERVAL: 164 MS
EKG Q-T INTERVAL: 362 MS
EKG QRS DURATION: 86 MS
EKG QTC CALCULATION (BAZETT): 407 MS
EKG R AXIS: 60 DEGREES
EKG T AXIS: -14 DEGREES
EKG VENTRICULAR RATE: 76 BPM

## 2024-01-08 PROCEDURE — 93005 ELECTROCARDIOGRAM TRACING: CPT | Performed by: INTERNAL MEDICINE

## 2024-01-08 PROCEDURE — 93010 ELECTROCARDIOGRAM REPORT: CPT | Performed by: INTERNAL MEDICINE

## 2024-01-16 ENCOUNTER — HOSPITAL ENCOUNTER (OUTPATIENT)
Age: 74
Setting detail: SPECIMEN
Discharge: HOME | End: 2024-01-16
Payer: MEDICARE

## 2024-01-16 LAB
ALBUMIN SERPL-MCNC: 4.2 G/DL (ref 3.5–5.2)
ALBUMIN/GLOB SERPL: 1.3 {RATIO} (ref 1–2.5)
ALP SERPL-CCNC: 117 U/L (ref 35–104)
ALT SERPL-CCNC: 19 U/L (ref 5–33)
ANION GAP SERPL CALCULATED.3IONS-SCNC: 12 MMOL/L (ref 9–17)
AST SERPL-CCNC: 20 U/L
BASOPHILS # BLD: 0.03 K/UL (ref 0–0.2)
BASOPHILS NFR BLD: 1 % (ref 0–2)
BILIRUB SERPL-MCNC: 0.3 MG/DL (ref 0.3–1.2)
BUN SERPL-MCNC: 21 MG/DL (ref 8–23)
BUN/CREAT SERPL: 26 (ref 9–20)
CALCIUM SERPL-MCNC: 9.8 MG/DL (ref 8.6–10.4)
CHLORIDE SERPL-SCNC: 103 MMOL/L (ref 98–107)
CHOLEST SERPL-MCNC: 168 MG/DL (ref 0–199)
CHOLESTEROL/HDL RATIO: 3
CO2 SERPL-SCNC: 26 MMOL/L (ref 20–31)
CREAT SERPL-MCNC: 0.8 MG/DL (ref 0.5–0.9)
EOSINOPHIL # BLD: <0.03 K/UL (ref 0–0.44)
EOSINOPHILS RELATIVE PERCENT: 0 % (ref 1–4)
ERYTHROCYTE [DISTWIDTH] IN BLOOD BY AUTOMATED COUNT: 14.3 % (ref 11.8–14.4)
GFR SERPL CREATININE-BSD FRML MDRD: >60 ML/MIN/1.73M2
GLUCOSE SERPL-MCNC: 93 MG/DL (ref 70–99)
HCT VFR BLD AUTO: 49.6 % (ref 36.3–47.1)
HDLC SERPL-MCNC: 57 MG/DL
HGB BLD-MCNC: 15.9 G/DL (ref 11.9–15.1)
IMM GRANULOCYTES # BLD AUTO: <0.03 K/UL (ref 0–0.3)
IMM GRANULOCYTES NFR BLD: 0 %
LDLC SERPL CALC-MCNC: 89 MG/DL (ref 0–100)
LYMPHOCYTES NFR BLD: 1.1 K/UL (ref 1.1–3.7)
LYMPHOCYTES RELATIVE PERCENT: 21 % (ref 24–43)
MCH RBC QN AUTO: 27.7 PG (ref 25.2–33.5)
MCHC RBC AUTO-ENTMCNC: 32.1 G/DL (ref 28.4–34.8)
MCV RBC AUTO: 86.4 FL (ref 82.6–102.9)
MONOCYTES NFR BLD: 0.47 K/UL (ref 0.1–1.2)
MONOCYTES NFR BLD: 9 % (ref 3–12)
NEUTROPHILS NFR BLD: 69 % (ref 36–65)
NEUTS SEG NFR BLD: 3.52 K/UL (ref 1.5–8.1)
NRBC BLD-RTO: 0 PER 100 WBC
PLATELET # BLD AUTO: 261 K/UL (ref 138–453)
PMV BLD AUTO: 11.2 FL (ref 8.1–13.5)
POTASSIUM SERPL-SCNC: 4.9 MMOL/L (ref 3.7–5.3)
PROT SERPL-MCNC: 7.4 G/DL (ref 6.4–8.3)
RBC # BLD AUTO: 5.74 M/UL (ref 3.95–5.11)
SODIUM SERPL-SCNC: 141 MMOL/L (ref 135–144)
TRIGL SERPL-MCNC: 109 MG/DL
VLDLC SERPL CALC-MCNC: 22 MG/DL
WBC OTHER # BLD: 5.1 K/UL (ref 3.5–11.3)

## 2024-01-16 PROCEDURE — P9603 ONE-WAY ALLOW PRORATED MILES: HCPCS

## 2024-01-16 PROCEDURE — 80053 COMPREHEN METABOLIC PANEL: CPT

## 2024-01-16 PROCEDURE — 80061 LIPID PANEL: CPT

## 2024-01-16 PROCEDURE — 85025 COMPLETE CBC W/AUTO DIFF WBC: CPT

## 2024-01-16 PROCEDURE — 36415 COLL VENOUS BLD VENIPUNCTURE: CPT

## 2024-06-04 ENCOUNTER — HOSPITAL ENCOUNTER (OUTPATIENT)
Dept: WOMENS IMAGING | Age: 74
Discharge: HOME | End: 2024-06-06
Payer: MEDICARE

## 2024-06-04 DIAGNOSIS — Z12.39 SCREENING BREAST EXAMINATION: ICD-10-CM

## 2024-06-04 PROCEDURE — 77063 BREAST TOMOSYNTHESIS BI: CPT

## 2024-07-08 ENCOUNTER — HOSPITAL ENCOUNTER (OUTPATIENT)
Age: 74
Discharge: HOME | End: 2024-07-08
Payer: MEDICARE

## 2024-07-08 LAB
EKG ATRIAL RATE: 74 BPM
EKG P AXIS: 48 DEGREES
EKG P-R INTERVAL: 152 MS
EKG Q-T INTERVAL: 364 MS
EKG QRS DURATION: 88 MS
EKG QTC CALCULATION (BAZETT): 404 MS
EKG R AXIS: 23 DEGREES
EKG T AXIS: -7 DEGREES
EKG VENTRICULAR RATE: 74 BPM

## 2024-07-08 PROCEDURE — 93005 ELECTROCARDIOGRAM TRACING: CPT | Performed by: FAMILY MEDICINE

## 2024-07-08 PROCEDURE — 93010 ELECTROCARDIOGRAM REPORT: CPT | Performed by: INTERNAL MEDICINE

## 2024-07-25 ENCOUNTER — HOSPITAL ENCOUNTER (OUTPATIENT)
Age: 74
Setting detail: SPECIMEN
Discharge: HOME | End: 2024-07-25
Payer: MEDICARE

## 2024-07-25 LAB
ALBUMIN SERPL-MCNC: 3.8 G/DL (ref 3.5–5.2)
ALBUMIN/GLOB SERPL: 1.3 {RATIO} (ref 1–2.5)
ALP SERPL-CCNC: 99 U/L (ref 35–104)
ALT SERPL-CCNC: 18 U/L (ref 5–33)
ANION GAP SERPL CALCULATED.3IONS-SCNC: 12 MMOL/L (ref 9–17)
AST SERPL-CCNC: 19 U/L
BASOPHILS # BLD: 0.03 K/UL (ref 0–0.2)
BASOPHILS NFR BLD: 1 % (ref 0–2)
BILIRUB SERPL-MCNC: <0.1 MG/DL (ref 0.3–1.2)
BUN SERPL-MCNC: 20 MG/DL (ref 8–23)
BUN/CREAT SERPL: 25 (ref 9–20)
CALCIUM SERPL-MCNC: 9.3 MG/DL (ref 8.6–10.4)
CHLORIDE SERPL-SCNC: 99 MMOL/L (ref 98–107)
CO2 SERPL-SCNC: 24 MMOL/L (ref 20–31)
CREAT SERPL-MCNC: 0.8 MG/DL (ref 0.5–0.9)
EOSINOPHIL # BLD: <0.03 K/UL (ref 0–0.44)
EOSINOPHILS RELATIVE PERCENT: 0 % (ref 1–4)
ERYTHROCYTE [DISTWIDTH] IN BLOOD BY AUTOMATED COUNT: 13.8 % (ref 11.8–14.4)
GFR, ESTIMATED: 78 ML/MIN/1.73M2
GLUCOSE SERPL-MCNC: 91 MG/DL (ref 70–99)
HCT VFR BLD AUTO: 44.7 % (ref 36.3–47.1)
HGB BLD-MCNC: 14.6 G/DL (ref 11.9–15.1)
IMM GRANULOCYTES # BLD AUTO: <0.03 K/UL (ref 0–0.3)
IMM GRANULOCYTES NFR BLD: 0 %
LYMPHOCYTES NFR BLD: 1.41 K/UL (ref 1.1–3.7)
LYMPHOCYTES RELATIVE PERCENT: 31 % (ref 24–43)
MCH RBC QN AUTO: 27.4 PG (ref 25.2–33.5)
MCHC RBC AUTO-ENTMCNC: 32.7 G/DL (ref 28.4–34.8)
MCV RBC AUTO: 84 FL (ref 82.6–102.9)
MONOCYTES NFR BLD: 0.51 K/UL (ref 0.1–1.2)
MONOCYTES NFR BLD: 11 % (ref 3–12)
NEUTROPHILS NFR BLD: 57 % (ref 36–65)
NEUTS SEG NFR BLD: 2.66 K/UL (ref 1.5–8.1)
NRBC BLD-RTO: 0 PER 100 WBC
PLATELET # BLD AUTO: 295 K/UL (ref 138–453)
PMV BLD AUTO: 10.3 FL (ref 8.1–13.5)
POTASSIUM SERPL-SCNC: 4.7 MMOL/L (ref 3.7–5.3)
PROT SERPL-MCNC: 6.8 G/DL (ref 6.4–8.3)
RBC # BLD AUTO: 5.32 M/UL (ref 3.95–5.11)
SODIUM SERPL-SCNC: 135 MMOL/L (ref 135–144)
WBC OTHER # BLD: 4.6 K/UL (ref 3.5–11.3)

## 2024-07-25 PROCEDURE — 36415 COLL VENOUS BLD VENIPUNCTURE: CPT

## 2024-07-25 PROCEDURE — 80053 COMPREHEN METABOLIC PANEL: CPT

## 2024-07-25 PROCEDURE — 85025 COMPLETE CBC W/AUTO DIFF WBC: CPT

## 2024-07-25 PROCEDURE — 80061 LIPID PANEL: CPT

## 2024-07-26 LAB
CHOLEST SERPL-MCNC: 160 MG/DL (ref 0–199)
CHOLESTEROL/HDL RATIO: 3
HDLC SERPL-MCNC: 50 MG/DL
LDLC SERPL CALC-MCNC: 94 MG/DL (ref 0–100)
TRIGL SERPL-MCNC: 77 MG/DL
VLDLC SERPL CALC-MCNC: 15 MG/DL

## 2025-01-13 ENCOUNTER — HOSPITAL ENCOUNTER (OUTPATIENT)
Age: 75
Discharge: HOME | End: 2025-01-13
Payer: MEDICARE

## 2025-01-13 LAB
EKG ATRIAL RATE: 88 BPM
EKG P AXIS: 61 DEGREES
EKG P-R INTERVAL: 158 MS
EKG Q-T INTERVAL: 328 MS
EKG QRS DURATION: 86 MS
EKG QTC CALCULATION (BAZETT): 396 MS
EKG R AXIS: 38 DEGREES
EKG T AXIS: -12 DEGREES
EKG VENTRICULAR RATE: 88 BPM

## 2025-01-13 PROCEDURE — 93010 ELECTROCARDIOGRAM REPORT: CPT | Performed by: INTERNAL MEDICINE

## 2025-01-13 PROCEDURE — 93005 ELECTROCARDIOGRAM TRACING: CPT | Performed by: FAMILY MEDICINE

## 2025-01-14 ENCOUNTER — HOSPITAL ENCOUNTER (OUTPATIENT)
Age: 75
Setting detail: SPECIMEN
Discharge: HOME | End: 2025-01-14
Payer: MEDICARE

## 2025-01-14 LAB
25(OH)D3 SERPL-MCNC: 44.8 NG/ML (ref 30–100)
ALBUMIN SERPL-MCNC: 4 G/DL (ref 3.5–5.2)
ALBUMIN/GLOB SERPL: 1.4 {RATIO} (ref 1–2.5)
ALP SERPL-CCNC: 124 U/L (ref 35–104)
ALT SERPL-CCNC: 14 U/L (ref 10–35)
ANION GAP SERPL CALCULATED.3IONS-SCNC: 14 MMOL/L (ref 9–16)
AST SERPL-CCNC: 20 U/L (ref 10–35)
BASOPHILS # BLD: 0.04 K/UL (ref 0–0.2)
BASOPHILS NFR BLD: 1 % (ref 0–2)
BILIRUB SERPL-MCNC: 0.3 MG/DL (ref 0–1.2)
BUN SERPL-MCNC: 14 MG/DL (ref 8–23)
BUN/CREAT SERPL: 18 (ref 9–20)
CALCIUM SERPL-MCNC: 9.5 MG/DL (ref 8.6–10.4)
CHLORIDE SERPL-SCNC: 99 MMOL/L (ref 98–107)
CHOLEST SERPL-MCNC: 169 MG/DL (ref 0–199)
CHOLESTEROL/HDL RATIO: 3.2
CO2 SERPL-SCNC: 21 MMOL/L (ref 20–31)
CREAT SERPL-MCNC: 0.8 MG/DL (ref 0.5–0.9)
EOSINOPHIL # BLD: 0.1 K/UL (ref 0–0.44)
EOSINOPHILS RELATIVE PERCENT: 2 % (ref 1–4)
ERYTHROCYTE [DISTWIDTH] IN BLOOD BY AUTOMATED COUNT: 14.2 % (ref 11.8–14.4)
ERYTHROCYTE [SEDIMENTATION RATE] IN BLOOD BY PHOTOMETRIC METHOD: 30 MM/HR (ref 0–30)
GFR, ESTIMATED: 79 ML/MIN/1.73M2
GLUCOSE SERPL-MCNC: 91 MG/DL (ref 74–99)
HCT VFR BLD AUTO: 45.4 % (ref 36.3–47.1)
HDLC SERPL-MCNC: 53 MG/DL
HGB BLD-MCNC: 14.6 G/DL (ref 11.9–15.1)
IMM GRANULOCYTES # BLD AUTO: <0.03 K/UL (ref 0–0.3)
IMM GRANULOCYTES NFR BLD: 0 %
LDLC SERPL CALC-MCNC: 99 MG/DL (ref 0–100)
LYMPHOCYTES NFR BLD: 1.09 K/UL (ref 1.1–3.7)
LYMPHOCYTES RELATIVE PERCENT: 22 % (ref 24–43)
MCH RBC QN AUTO: 27.2 PG (ref 25.2–33.5)
MCHC RBC AUTO-ENTMCNC: 32.2 G/DL (ref 28.4–34.8)
MCV RBC AUTO: 84.7 FL (ref 82.6–102.9)
MONOCYTES NFR BLD: 0.41 K/UL (ref 0.1–1.2)
MONOCYTES NFR BLD: 8 % (ref 3–12)
NEUTROPHILS NFR BLD: 67 % (ref 36–65)
NEUTS SEG NFR BLD: 3.29 K/UL (ref 1.5–8.1)
NRBC BLD-RTO: 0 PER 100 WBC
PLATELET # BLD AUTO: 311 K/UL (ref 138–453)
PMV BLD AUTO: 10.1 FL (ref 8.1–13.5)
POTASSIUM SERPL-SCNC: 4.3 MMOL/L (ref 3.7–5.3)
PROT SERPL-MCNC: 6.7 G/DL (ref 6.6–8.7)
RBC # BLD AUTO: 5.36 M/UL (ref 3.95–5.11)
SODIUM SERPL-SCNC: 134 MMOL/L (ref 136–145)
TRIGL SERPL-MCNC: 87 MG/DL
VLDLC SERPL CALC-MCNC: 17 MG/DL (ref 1–30)
WBC OTHER # BLD: 5 K/UL (ref 3.5–11.3)

## 2025-01-14 PROCEDURE — 80061 LIPID PANEL: CPT

## 2025-01-14 PROCEDURE — 80053 COMPREHEN METABOLIC PANEL: CPT

## 2025-01-14 PROCEDURE — 85652 RBC SED RATE AUTOMATED: CPT

## 2025-01-14 PROCEDURE — P9604 ONE-WAY ALLOW PRORATED TRIP: HCPCS

## 2025-01-14 PROCEDURE — 82306 VITAMIN D 25 HYDROXY: CPT

## 2025-01-14 PROCEDURE — 85025 COMPLETE CBC W/AUTO DIFF WBC: CPT

## 2025-01-14 PROCEDURE — 36415 COLL VENOUS BLD VENIPUNCTURE: CPT

## 2025-04-16 ENCOUNTER — HOSPITAL ENCOUNTER (OUTPATIENT)
Age: 75
Setting detail: SPECIMEN
Discharge: HOME | End: 2025-04-16
Payer: MEDICARE

## 2025-04-16 LAB
BILIRUB UR QL STRIP: NEGATIVE
CLARITY UR: CLEAR
COLOR UR: YELLOW
GLUCOSE UR STRIP-MCNC: NEGATIVE MG/DL
HGB UR QL STRIP.AUTO: NEGATIVE
KETONES UR STRIP-MCNC: NEGATIVE MG/DL
LEUKOCYTE ESTERASE UR QL STRIP: NEGATIVE
NITRITE UR QL STRIP: NEGATIVE
PH UR STRIP: 6 [PH] (ref 5–9)
PROT UR STRIP-MCNC: NEGATIVE MG/DL
SP GR UR STRIP: 1.02 (ref 1.01–1.02)
UROBILINOGEN UR STRIP-ACNC: NORMAL EU/DL (ref 0–1)

## 2025-04-16 PROCEDURE — 81003 URINALYSIS AUTO W/O SCOPE: CPT

## 2025-04-25 ENCOUNTER — HOSPITAL ENCOUNTER (OUTPATIENT)
Age: 75
Setting detail: SPECIMEN
Discharge: HOME | End: 2025-04-25
Payer: MEDICARE

## 2025-04-25 LAB

## 2025-04-25 PROCEDURE — 81001 URINALYSIS AUTO W/SCOPE: CPT

## 2025-06-26 ENCOUNTER — HOSPITAL ENCOUNTER (OUTPATIENT)
Dept: WOMENS IMAGING | Age: 75
Discharge: HOME | End: 2025-06-28
Payer: MEDICARE

## 2025-06-26 DIAGNOSIS — Z12.31 BREAST CANCER SCREENING BY MAMMOGRAM: ICD-10-CM

## 2025-06-26 PROCEDURE — 77063 BREAST TOMOSYNTHESIS BI: CPT

## 2025-07-03 ENCOUNTER — HOSPITAL ENCOUNTER (OUTPATIENT)
Age: 75
Setting detail: SPECIMEN
Discharge: HOME | End: 2025-07-03

## 2025-07-04 ENCOUNTER — HOSPITAL ENCOUNTER (OUTPATIENT)
Age: 75
Setting detail: SPECIMEN
Discharge: HOME | End: 2025-07-04
Payer: MEDICARE

## 2025-07-04 LAB
ALBUMIN SERPL-MCNC: 4 G/DL (ref 3.5–5.2)
ALBUMIN/GLOB SERPL: 1.4 {RATIO} (ref 1–2.5)
ALP SERPL-CCNC: 123 U/L (ref 35–104)
ALT SERPL-CCNC: 17 U/L (ref 10–35)
ANION GAP SERPL CALCULATED.3IONS-SCNC: 10 MMOL/L (ref 9–16)
AST SERPL-CCNC: 21 U/L (ref 10–35)
BASOPHILS # BLD: 0.05 K/UL (ref 0–0.2)
BASOPHILS NFR BLD: 1 % (ref 0–2)
BILIRUB SERPL-MCNC: 0.2 MG/DL (ref 0–1.2)
BUN SERPL-MCNC: 17 MG/DL (ref 8–23)
BUN/CREAT SERPL: 21 (ref 9–20)
CALCIUM SERPL-MCNC: 9.3 MG/DL (ref 8.6–10.4)
CHLORIDE SERPL-SCNC: 102 MMOL/L (ref 98–107)
CHOLEST SERPL-MCNC: 155 MG/DL (ref 0–199)
CHOLESTEROL/HDL RATIO: 2.7
CO2 SERPL-SCNC: 24 MMOL/L (ref 20–31)
CREAT SERPL-MCNC: 0.8 MG/DL (ref 0.5–0.9)
EOSINOPHIL # BLD: 0.03 K/UL (ref 0–0.44)
EOSINOPHILS RELATIVE PERCENT: 1 % (ref 1–4)
ERYTHROCYTE [DISTWIDTH] IN BLOOD BY AUTOMATED COUNT: 14.3 % (ref 11.8–14.4)
GFR, ESTIMATED: 83 ML/MIN/1.73M2
GLUCOSE SERPL-MCNC: 95 MG/DL (ref 74–99)
HCT VFR BLD AUTO: 46.6 % (ref 36.3–47.1)
HDLC SERPL-MCNC: 57 MG/DL
HGB BLD-MCNC: 15 G/DL (ref 11.9–15.1)
IMM GRANULOCYTES # BLD AUTO: <0.03 K/UL (ref 0–0.3)
IMM GRANULOCYTES NFR BLD: 0 %
LDLC SERPL CALC-MCNC: 85 MG/DL (ref 0–100)
LYMPHOCYTES NFR BLD: 1.42 K/UL (ref 1.1–3.7)
LYMPHOCYTES RELATIVE PERCENT: 27 % (ref 24–43)
MCH RBC QN AUTO: 27.6 PG (ref 25.2–33.5)
MCHC RBC AUTO-ENTMCNC: 32.2 G/DL (ref 28.4–34.8)
MCV RBC AUTO: 85.8 FL (ref 82.6–102.9)
MONOCYTES NFR BLD: 0.51 K/UL (ref 0.1–1.2)
MONOCYTES NFR BLD: 10 % (ref 3–12)
NEUTROPHILS NFR BLD: 61 % (ref 36–65)
NEUTS SEG NFR BLD: 3.27 K/UL (ref 1.5–8.1)
NRBC BLD-RTO: 0 PER 100 WBC
PLATELET # BLD AUTO: 273 K/UL (ref 138–453)
PMV BLD AUTO: 10.9 FL (ref 8.1–13.5)
POTASSIUM SERPL-SCNC: 4.5 MMOL/L (ref 3.7–5.3)
PROT SERPL-MCNC: 6.9 G/DL (ref 6.6–8.7)
RBC # BLD AUTO: 5.43 M/UL (ref 3.95–5.11)
SODIUM SERPL-SCNC: 136 MMOL/L (ref 136–145)
TRIGL SERPL-MCNC: 67 MG/DL
VLDLC SERPL CALC-MCNC: 13 MG/DL (ref 1–30)
WBC OTHER # BLD: 5.3 K/UL (ref 3.5–11.3)

## 2025-07-04 PROCEDURE — P9604 ONE-WAY ALLOW PRORATED TRIP: HCPCS

## 2025-07-04 PROCEDURE — 80061 LIPID PANEL: CPT

## 2025-07-04 PROCEDURE — 85025 COMPLETE CBC W/AUTO DIFF WBC: CPT

## 2025-07-04 PROCEDURE — 36415 COLL VENOUS BLD VENIPUNCTURE: CPT

## 2025-07-04 PROCEDURE — 80053 COMPREHEN METABOLIC PANEL: CPT

## 2025-07-14 ENCOUNTER — HOSPITAL ENCOUNTER (OUTPATIENT)
Dept: NON INVASIVE DIAGNOSTICS | Age: 75
Discharge: HOME | End: 2025-07-14
Payer: MEDICARE

## 2025-07-14 ENCOUNTER — TRANSCRIBE ORDERS (OUTPATIENT)
Dept: ADMISSION | Age: 75
End: 2025-07-14

## 2025-07-14 DIAGNOSIS — Z79.899 ENCOUNTER FOR LONG-TERM (CURRENT) USE OF MEDICATIONS: Primary | ICD-10-CM

## 2025-07-14 DIAGNOSIS — Z79.899 ENCOUNTER FOR LONG-TERM (CURRENT) USE OF MEDICATIONS: ICD-10-CM

## 2025-07-14 PROCEDURE — 93005 ELECTROCARDIOGRAM TRACING: CPT

## 2025-07-15 LAB
EKG ATRIAL RATE: 77 BPM
EKG P AXIS: 69 DEGREES
EKG P-R INTERVAL: 158 MS
EKG Q-T INTERVAL: 348 MS
EKG QRS DURATION: 84 MS
EKG QTC CALCULATION (BAZETT): 393 MS
EKG R AXIS: 37 DEGREES
EKG T AXIS: 4 DEGREES
EKG VENTRICULAR RATE: 77 BPM

## 2025-08-01 ENCOUNTER — HOSPITAL ENCOUNTER (OUTPATIENT)
Dept: GENERAL RADIOLOGY | Age: 75
End: 2025-08-01
Payer: MEDICARE

## 2025-08-01 ENCOUNTER — HOSPITAL ENCOUNTER (OUTPATIENT)
Dept: GENERAL RADIOLOGY | Age: 75
Discharge: HOME | End: 2025-08-01
Payer: MEDICARE
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
## 2025-08-01 DIAGNOSIS — R52 PAIN: ICD-10-CM

## 2025-08-01 PROCEDURE — 73502 X-RAY EXAM HIP UNI 2-3 VIEWS: CPT

## 2025-08-01 PROCEDURE — 72170 X-RAY EXAM OF PELVIS: CPT

## 2025-08-01 PROCEDURE — 72100 X-RAY EXAM L-S SPINE 2/3 VWS: CPT

## 2025-08-04 ENCOUNTER — HOSPITAL ENCOUNTER (OUTPATIENT)
Dept: GENERAL RADIOLOGY | Age: 75
Discharge: HOME | End: 2025-08-06
Payer: MEDICARE

## 2025-08-04 ENCOUNTER — HOSPITAL ENCOUNTER (OUTPATIENT)
Dept: CT IMAGING | Age: 75
Discharge: HOME | End: 2025-08-06
Payer: MEDICARE

## 2025-08-04 ENCOUNTER — TRANSCRIBE ORDERS (OUTPATIENT)
Dept: ADMINISTRATIVE | Age: 75
End: 2025-08-04

## 2025-08-04 DIAGNOSIS — M25.552 BILATERAL HIP PAIN: ICD-10-CM
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
## 2025-08-04 DIAGNOSIS — M25.552 BILATERAL HIP PAIN: Primary | ICD-10-CM

## 2025-08-04 DIAGNOSIS — M25.551 BILATERAL HIP PAIN: Primary | ICD-10-CM

## 2025-08-04 DIAGNOSIS — M25.551 BILATERAL HIP PAIN: ICD-10-CM
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
## 2025-08-04 PROCEDURE — 72192 CT PELVIS W/O DYE: CPT

## 2025-08-04 PROCEDURE — 73552 X-RAY EXAM OF FEMUR 2/>: CPT

## 2025-08-06 ENCOUNTER — OFFICE VISIT (OUTPATIENT)
Dept: CARDIOLOGY | Age: 75
End: 2025-08-06
Payer: MEDICARE

## 2025-08-06 VITALS — HEART RATE: 77 BPM | OXYGEN SATURATION: 97 % | SYSTOLIC BLOOD PRESSURE: 101 MMHG | DIASTOLIC BLOOD PRESSURE: 68 MMHG

## 2025-08-06 DIAGNOSIS — F79 INTELLECTUAL DISABILITY: ICD-10-CM

## 2025-08-06 DIAGNOSIS — I10 PRIMARY HYPERTENSION: Primary | ICD-10-CM

## 2025-08-06 DIAGNOSIS — R94.31 ABNORMAL EKG: ICD-10-CM

## 2025-08-06 DIAGNOSIS — G24.01 TARDIVE DYSKINESIA: ICD-10-CM

## 2025-08-06 DIAGNOSIS — E78.2 MIXED HYPERLIPIDEMIA: ICD-10-CM

## 2025-08-06 PROCEDURE — 3017F COLORECTAL CA SCREEN DOC REV: CPT | Performed by: INTERNAL MEDICINE

## 2025-08-06 PROCEDURE — G8427 DOCREV CUR MEDS BY ELIG CLIN: HCPCS | Performed by: INTERNAL MEDICINE

## 2025-08-06 PROCEDURE — 3074F SYST BP LT 130 MM HG: CPT | Performed by: INTERNAL MEDICINE

## 2025-08-06 PROCEDURE — G8421 BMI NOT CALCULATED: HCPCS | Performed by: INTERNAL MEDICINE

## 2025-08-06 PROCEDURE — 3078F DIAST BP <80 MM HG: CPT | Performed by: INTERNAL MEDICINE

## 2025-08-06 PROCEDURE — 93005 ELECTROCARDIOGRAM TRACING: CPT | Performed by: INTERNAL MEDICINE

## 2025-08-06 PROCEDURE — 99211 OFF/OP EST MAY X REQ PHY/QHP: CPT | Performed by: INTERNAL MEDICINE

## 2025-08-06 PROCEDURE — 1159F MED LIST DOCD IN RCRD: CPT | Performed by: INTERNAL MEDICINE

## 2025-08-06 PROCEDURE — 99203 OFFICE O/P NEW LOW 30 MIN: CPT | Performed by: INTERNAL MEDICINE

## 2025-08-06 PROCEDURE — 1123F ACP DISCUSS/DSCN MKR DOCD: CPT | Performed by: INTERNAL MEDICINE

## 2025-08-06 PROCEDURE — 1090F PRES/ABSN URINE INCON ASSESS: CPT | Performed by: INTERNAL MEDICINE

## 2025-08-06 PROCEDURE — 4004F PT TOBACCO SCREEN RCVD TLK: CPT | Performed by: INTERNAL MEDICINE

## 2025-08-06 PROCEDURE — G8400 PT W/DXA NO RESULTS DOC: HCPCS | Performed by: INTERNAL MEDICINE

## 2025-08-06 PROCEDURE — 93010 ELECTROCARDIOGRAM REPORT: CPT | Performed by: INTERNAL MEDICINE

## 2025-08-06 RX ORDER — SACCHAROMYCES BOULARDII/FOS 5B-200 MG
CAPSULE ORAL
COMMUNITY

## 2025-08-14 ENCOUNTER — HOSPITAL ENCOUNTER (OUTPATIENT)
Age: 75
Discharge: HOME | End: 2025-08-16
Attending: INTERNAL MEDICINE
Payer: MEDICARE

## 2025-08-14 VITALS
WEIGHT: 139 LBS | BODY MASS INDEX: 27.29 KG/M2 | HEIGHT: 60 IN | DIASTOLIC BLOOD PRESSURE: 68 MMHG | SYSTOLIC BLOOD PRESSURE: 101 MMHG

## 2025-08-14 DIAGNOSIS — R94.31 ABNORMAL EKG: ICD-10-CM

## 2025-08-14 DIAGNOSIS — I10 PRIMARY HYPERTENSION: ICD-10-CM

## 2025-08-14 DIAGNOSIS — G24.01 TARDIVE DYSKINESIA: ICD-10-CM

## 2025-08-14 DIAGNOSIS — F79 INTELLECTUAL DISABILITY: ICD-10-CM

## 2025-08-14 LAB
ECHO AO ROOT DIAM: 2.5 CM
ECHO AO ROOT INDEX: 1.56 CM/M2
ECHO AV CUSP MM: 1.7 CM
ECHO AV MEAN GRADIENT: 5 MMHG
ECHO AV MEAN VELOCITY: 1 M/S
ECHO AV PEAK GRADIENT: 8 MMHG
ECHO AV PEAK VELOCITY: 1.5 M/S
ECHO AV VELOCITY RATIO: 0.73
ECHO AV VTI: 24.2 CM
ECHO BSA: 1.63 M2
ECHO LA AREA 4C: 13.7 CM2
ECHO LA MAJOR AXIS: 4.8 CM
ECHO LA VOL MOD A4C: 33 ML (ref 22–52)
ECHO LA VOLUME INDEX MOD A4C: 21 ML/M2 (ref 16–34)
ECHO LV E' LATERAL VELOCITY: 6.85 CM/S
ECHO LV EDV A4C: 26 ML
ECHO LV EDV INDEX A4C: 16 ML/M2
ECHO LV EF PHYSICIAN: 65 %
ECHO LV EJECTION FRACTION A4C: 68 %
ECHO LV EJECTION FRACTION BIPLANE: 67 % (ref 55–100)
ECHO LV ESV A4C: 9 ML
ECHO LV ESV INDEX A4C: 6 ML/M2
ECHO LV FRACTIONAL SHORTENING: 34 % (ref 28–44)
ECHO LV INTERNAL DIMENSION DIASTOLE INDEX: 2.38 CM/M2
ECHO LV INTERNAL DIMENSION DIASTOLIC: 3.8 CM (ref 3.9–5.3)
ECHO LV INTERNAL DIMENSION SYSTOLIC INDEX: 1.56 CM/M2
ECHO LV INTERNAL DIMENSION SYSTOLIC: 2.5 CM
ECHO LV IVSD: 0.9 CM (ref 0.6–0.9)
ECHO LV MASS 2D: 86 G (ref 67–162)
ECHO LV MASS INDEX 2D: 53.7 G/M2 (ref 43–95)
ECHO LV POSTERIOR WALL DIASTOLIC: 0.7 CM (ref 0.6–0.9)
ECHO LV RELATIVE WALL THICKNESS RATIO: 0.37
ECHO LVOT AV VTI INDEX: 0.75
ECHO LVOT MEAN GRADIENT: 3 MMHG
ECHO LVOT PEAK GRADIENT: 5 MMHG
ECHO LVOT PEAK VELOCITY: 1.1 M/S
ECHO LVOT VTI: 18.1 CM
ECHO MV A VELOCITY: 0.74 M/S
ECHO MV E DECELERATION TIME (DT): 180 MS
ECHO MV E VELOCITY: 0.56 M/S
ECHO MV E/A RATIO: 0.76
ECHO MV E/E' LATERAL: 8.18
ECHO PV MAX VELOCITY: 1 M/S
ECHO PV PEAK GRADIENT: 4 MMHG

## 2025-08-14 PROCEDURE — 93306 TTE W/DOPPLER COMPLETE: CPT

## 2025-08-17 ENCOUNTER — RESULTS FOLLOW-UP (OUTPATIENT)
Dept: CARDIOLOGY | Age: 75
End: 2025-08-17

## (undated) DEVICE — SOLUTION IV IRRIG POUR BRL 0.9% SODIUM CHL 2F7124

## (undated) DEVICE — TOWEL SURG SM W12XL18IN CLR PLAS TEAR RESIST REINF ADH FRST

## (undated) DEVICE — PAD,NON-ADHERENT,2X3,STERILE,LF,1/PK: Brand: MEDLINE

## (undated) DEVICE — HAND AND FT PK

## (undated) DEVICE — SUTURE NONABSORBABLE MONOFILAMENT 3-0 PS-1 18 IN BLK ETHILON 1663H

## (undated) DEVICE — CANNULA IV 18GA L15IN BLNT FILL LUERLOCK HUB MJCT

## (undated) DEVICE — UNDERGLOVE SURG SZ 8 BLU LTX FREE SYN POLYISOPRENE POLYMER

## (undated) DEVICE — NEEDLE HYPO 25GA L1.5IN BLU POLYPR HUB S STL REG BVL STR

## (undated) DEVICE — INTENDED FOR TISSUE SEPARATION, AND OTHER PROCEDURES THAT REQUIRE A SHARP SURGICAL BLADE TO PUNCTURE OR CUT.: Brand: BARD-PARKER ® CARBON RIB-BACK BLADES

## (undated) DEVICE — TUBING, SUCTION, 9/32" X 12', STRAIGHT: Brand: MEDLINE INDUSTRIES, INC.

## (undated) DEVICE — YANKAUER,OPEN TIP,W/O VENT,STERILE: Brand: MEDLINE INDUSTRIES, INC.

## (undated) DEVICE — GLOVE ORANGE PI 7   MSG9070